# Patient Record
Sex: MALE | Race: BLACK OR AFRICAN AMERICAN | Employment: UNEMPLOYED | URBAN - METROPOLITAN AREA
[De-identification: names, ages, dates, MRNs, and addresses within clinical notes are randomized per-mention and may not be internally consistent; named-entity substitution may affect disease eponyms.]

---

## 2017-01-16 PROBLEM — I10 HTN (HYPERTENSION): Status: ACTIVE | Noted: 2017-01-16

## 2017-01-16 PROBLEM — A41.9 SEPSIS (HCC): Status: ACTIVE | Noted: 2017-01-16

## 2017-01-16 PROBLEM — Z93.59 SUPRAPUBIC CATHETER (HCC): Status: ACTIVE | Noted: 2017-01-16

## 2018-03-07 ENCOUNTER — HOSPITAL ENCOUNTER (INPATIENT)
Dept: ONCOLOGY | Age: 68
LOS: 14 days | Discharge: NURSING, REHAB OR LONG-TERM CARE | DRG: 244 | End: 2018-03-21
Attending: EMERGENCY MEDICINE | Admitting: INTERNAL MEDICINE
Payer: MEDICAID

## 2018-03-07 DIAGNOSIS — D50.0 ANEMIA, BLOOD LOSS: ICD-10-CM

## 2018-03-07 DIAGNOSIS — E87.5 HYPERKALEMIA: ICD-10-CM

## 2018-03-07 DIAGNOSIS — K92.2 GASTROINTESTINAL HEMORRHAGE, UNSPECIFIED GASTROINTESTINAL HEMORRHAGE TYPE: Primary | ICD-10-CM

## 2018-03-07 DIAGNOSIS — N28.9 RENAL INSUFFICIENCY: ICD-10-CM

## 2018-03-07 PROBLEM — D62 ACUTE BLOOD LOSS ANEMIA: Status: ACTIVE | Noted: 2018-03-07

## 2018-03-07 PROBLEM — N17.9 AKI (ACUTE KIDNEY INJURY) (HCC): Status: ACTIVE | Noted: 2018-03-07

## 2018-03-07 PROBLEM — E78.5 HYPERLIPIDEMIA: Chronic | Status: ACTIVE | Noted: 2018-03-07

## 2018-03-07 PROBLEM — E11.9 DM2 (DIABETES MELLITUS, TYPE 2) (HCC): Chronic | Status: ACTIVE | Noted: 2018-03-07

## 2018-03-07 PROBLEM — I10 HTN (HYPERTENSION): Chronic | Status: ACTIVE | Noted: 2017-01-16

## 2018-03-07 LAB
ABO/RH: NORMAL
ALBUMIN SERPL-MCNC: 3.5 G/DL (ref 3.5–5.2)
ALP BLD-CCNC: 102 U/L (ref 40–129)
ALT SERPL-CCNC: 8 U/L (ref 0–40)
ANION GAP SERPL CALCULATED.3IONS-SCNC: 11 MMOL/L (ref 7–16)
ANTIBODY SCREEN: NORMAL
AST SERPL-CCNC: 13 U/L (ref 0–39)
BASOPHILS ABSOLUTE: 0.01 E9/L (ref 0–0.2)
BASOPHILS RELATIVE PERCENT: 0.2 % (ref 0–2)
BILIRUB SERPL-MCNC: <0.2 MG/DL (ref 0–1.2)
BUN BLDV-MCNC: 26 MG/DL (ref 8–23)
CALCIUM SERPL-MCNC: 9.3 MG/DL (ref 8.6–10.2)
CHLORIDE BLD-SCNC: 103 MMOL/L (ref 98–107)
CO2: 26 MMOL/L (ref 22–29)
CREAT SERPL-MCNC: 1.8 MG/DL (ref 0.7–1.2)
EOSINOPHILS ABSOLUTE: 0.46 E9/L (ref 0.05–0.5)
EOSINOPHILS RELATIVE PERCENT: 7.9 % (ref 0–6)
GFR AFRICAN AMERICAN: 46
GFR NON-AFRICAN AMERICAN: 46 ML/MIN/1.73
GLUCOSE BLD-MCNC: 95 MG/DL (ref 74–109)
HCT VFR BLD CALC: 20.6 % (ref 37–54)
HEMOGLOBIN: 6.1 G/DL (ref 12.5–16.5)
IMMATURE GRANULOCYTES #: 0.02 E9/L
IMMATURE GRANULOCYTES %: 0.3 % (ref 0–5)
LYMPHOCYTES ABSOLUTE: 2.38 E9/L (ref 1.5–4)
LYMPHOCYTES RELATIVE PERCENT: 41.1 % (ref 20–42)
MCH RBC QN AUTO: 30 PG (ref 26–35)
MCHC RBC AUTO-ENTMCNC: 29.6 % (ref 32–34.5)
MCV RBC AUTO: 101.5 FL (ref 80–99.9)
MONOCYTES ABSOLUTE: 0.36 E9/L (ref 0.1–0.95)
MONOCYTES RELATIVE PERCENT: 6.2 % (ref 2–12)
NEUTROPHILS ABSOLUTE: 2.56 E9/L (ref 1.8–7.3)
NEUTROPHILS RELATIVE PERCENT: 44.3 % (ref 43–80)
PDW BLD-RTO: 15.2 FL (ref 11.5–15)
PLATELET # BLD: 715 E9/L (ref 130–450)
PMV BLD AUTO: 8 FL (ref 7–12)
POTASSIUM SERPL-SCNC: 5.4 MMOL/L (ref 3.5–5)
RBC # BLD: 2.03 E12/L (ref 3.8–5.8)
SODIUM BLD-SCNC: 140 MMOL/L (ref 132–146)
TOTAL PROTEIN: 6.8 G/DL (ref 6.4–8.3)
TROPONIN: 0.01 NG/ML (ref 0–0.03)
WBC # BLD: 5.8 E9/L (ref 4.5–11.5)

## 2018-03-07 PROCEDURE — 84484 ASSAY OF TROPONIN QUANT: CPT

## 2018-03-07 PROCEDURE — 86850 RBC ANTIBODY SCREEN: CPT

## 2018-03-07 PROCEDURE — 87070 CULTURE OTHR SPECIMN AEROBIC: CPT

## 2018-03-07 PROCEDURE — 85025 COMPLETE CBC W/AUTO DIFF WBC: CPT

## 2018-03-07 PROCEDURE — P9016 RBC LEUKOCYTES REDUCED: HCPCS

## 2018-03-07 PROCEDURE — 86901 BLOOD TYPING SEROLOGIC RH(D): CPT

## 2018-03-07 PROCEDURE — C9113 INJ PANTOPRAZOLE SODIUM, VIA: HCPCS

## 2018-03-07 PROCEDURE — 93005 ELECTROCARDIOGRAM TRACING: CPT

## 2018-03-07 PROCEDURE — 9990 CHARGE CONVERSION

## 2018-03-07 PROCEDURE — 86923 COMPATIBILITY TEST ELECTRIC: CPT

## 2018-03-07 PROCEDURE — 99285 EMERGENCY DEPT VISIT HI MDM: CPT

## 2018-03-07 PROCEDURE — 86900 BLOOD TYPING SEROLOGIC ABO: CPT

## 2018-03-07 PROCEDURE — 80053 COMPREHEN METABOLIC PANEL: CPT

## 2018-03-07 PROCEDURE — 36415 COLL VENOUS BLD VENIPUNCTURE: CPT

## 2018-03-07 RX ORDER — GUAIFENESIN AND DEXTROMETHORPHAN HYDROBROMIDE 100; 10 MG/5ML; MG/5ML
15 SOLUTION ORAL EVERY 4 HOURS PRN
COMMUNITY
End: 2018-07-30

## 2018-03-07 RX ORDER — 0.9 % SODIUM CHLORIDE 0.9 %
250 INTRAVENOUS SOLUTION INTRAVENOUS ONCE
Status: COMPLETED | OUTPATIENT
Start: 2018-03-07 | End: 2018-03-09

## 2018-03-07 RX ORDER — SODIUM CHLORIDE 0.9 % (FLUSH) 0.9 %
10 SYRINGE (ML) INJECTION EVERY 12 HOURS
Status: DISCONTINUED | OUTPATIENT
Start: 2018-03-07 | End: 2018-03-21 | Stop reason: HOSPADM

## 2018-03-07 RX ORDER — FLUTICASONE PROPIONATE 50 MCG
1 SPRAY, SUSPENSION (ML) NASAL 2 TIMES DAILY
COMMUNITY

## 2018-03-07 RX ORDER — SODIUM CHLORIDE 0.9 % (FLUSH) 0.9 %
10 SYRINGE (ML) INJECTION PRN
Status: DISCONTINUED | OUTPATIENT
Start: 2018-03-07 | End: 2018-03-21 | Stop reason: HOSPADM

## 2018-03-07 RX ORDER — ACETAMINOPHEN 325 MG/1
650 TABLET ORAL EVERY 4 HOURS PRN
Status: DISCONTINUED | OUTPATIENT
Start: 2018-03-07 | End: 2018-03-21 | Stop reason: HOSPADM

## 2018-03-07 RX ORDER — SODIUM CHLORIDE 0.9 % (FLUSH) 0.9 %
10 SYRINGE (ML) INJECTION EVERY 12 HOURS SCHEDULED
Status: DISCONTINUED | OUTPATIENT
Start: 2018-03-07 | End: 2018-03-21 | Stop reason: HOSPADM

## 2018-03-07 RX ORDER — 0.9 % SODIUM CHLORIDE 0.9 %
1000 INTRAVENOUS SOLUTION INTRAVENOUS ONCE
Status: COMPLETED | OUTPATIENT
Start: 2018-03-07 | End: 2018-03-07

## 2018-03-07 RX ORDER — M-VIT,TX,IRON,MINS/CALC/FOLIC 27MG-0.4MG
1 TABLET ORAL DAILY
Status: ON HOLD | COMMUNITY
End: 2020-01-01 | Stop reason: HOSPADM

## 2018-03-07 RX ORDER — PANTOPRAZOLE SODIUM 40 MG/10ML
40 INJECTION, POWDER, LYOPHILIZED, FOR SOLUTION INTRAVENOUS ONCE
Status: COMPLETED | OUTPATIENT
Start: 2018-03-07 | End: 2018-03-07

## 2018-03-07 RX ADMIN — Medication 1000 ML: at 19:00

## 2018-03-07 RX ADMIN — Medication 10 ML: at 23:30

## 2018-03-07 RX ADMIN — PANTOPRAZOLE SODIUM 40 MG: 40 INJECTION, POWDER, LYOPHILIZED, FOR SOLUTION INTRAVENOUS at 20:32

## 2018-03-07 ASSESSMENT — PAIN - FUNCTIONAL ASSESSMENT: PAIN_FUNCTIONAL_ASSESSMENT: 0-10

## 2018-03-07 ASSESSMENT — PAIN SCALES - GENERAL: PAINLEVEL_OUTOF10: 0

## 2018-03-08 PROBLEM — Z87.891 FORMER SMOKER: Chronic | Status: ACTIVE | Noted: 2018-03-07

## 2018-03-08 PROBLEM — F03.90 DEMENTIA (HCC): Chronic | Status: ACTIVE | Noted: 2018-03-07

## 2018-03-08 PROBLEM — Z93.59 SUPRAPUBIC CATHETER (HCC): Chronic | Status: ACTIVE | Noted: 2017-01-16

## 2018-03-08 PROBLEM — L98.499 NON-HEALING ULCER (HCC): Chronic | Status: ACTIVE | Noted: 2018-03-07

## 2018-03-08 PROBLEM — R32 INCONTINENCE: Chronic | Status: ACTIVE | Noted: 2018-03-07

## 2018-03-08 PROBLEM — I69.30 CHRONIC ARTERIAL ISCHEMIC STROKE: Chronic | Status: ACTIVE | Noted: 2018-03-07

## 2018-03-08 PROBLEM — L98.499 NON-HEALING ULCER (HCC): Status: ACTIVE | Noted: 2018-03-07

## 2018-03-08 LAB
ANION GAP SERPL CALCULATED.3IONS-SCNC: 10 MMOL/L (ref 7–16)
ANISOCYTOSIS: ABNORMAL
BASOPHILIC STIPPLING: ABNORMAL
BASOPHILS ABSOLUTE: 0.01 E9/L (ref 0–0.2)
BASOPHILS RELATIVE PERCENT: 0.2 % (ref 0–2)
BLOOD BANK DISPENSE STATUS: NORMAL
BLOOD BANK DISPENSE STATUS: NORMAL
BLOOD BANK PRODUCT CODE: NORMAL
BLOOD BANK PRODUCT CODE: NORMAL
BPU ID: NORMAL
BPU ID: NORMAL
BUN BLDV-MCNC: 21 MG/DL (ref 8–23)
BURR CELLS: ABNORMAL
CALCIUM SERPL-MCNC: 8.4 MG/DL (ref 8.6–10.2)
CHLORIDE BLD-SCNC: 102 MMOL/L (ref 98–107)
CO2: 24 MMOL/L (ref 22–29)
CREAT SERPL-MCNC: 1.8 MG/DL (ref 0.7–1.2)
DESCRIPTION BLOOD BANK: NORMAL
DESCRIPTION BLOOD BANK: NORMAL
EOSINOPHILS ABSOLUTE: 0.49 E9/L (ref 0.05–0.5)
EOSINOPHILS RELATIVE PERCENT: 7.8 % (ref 0–6)
FERRITIN: 53 NG/ML
FOLATE: 18 NG/ML (ref 4.8–24.2)
GFR AFRICAN AMERICAN: 46
GFR NON-AFRICAN AMERICAN: 46 ML/MIN/1.73
GLUCOSE BLD-MCNC: 189 MG/DL (ref 74–109)
HBA1C MFR BLD: 5.6 % (ref 4.8–5.9)
HCT VFR BLD CALC: 25.2 % (ref 37–54)
HCT VFR BLD CALC: 25.4 % (ref 37–54)
HCT VFR BLD CALC: 26 % (ref 37–54)
HEMOGLOBIN: 7.9 G/DL (ref 12.5–16.5)
HEMOGLOBIN: 8 G/DL (ref 12.5–16.5)
HEMOGLOBIN: 8.2 G/DL (ref 12.5–16.5)
HYPOCHROMIA: ABNORMAL
IMMATURE GRANULOCYTES #: 0.04 E9/L
IMMATURE GRANULOCYTES %: 0.6 % (ref 0–5)
IMMATURE RETIC FRACT: 25.6 % (ref 2.3–13.4)
IRON SATURATION: 20 % (ref 20–55)
IRON: 66 MCG/DL (ref 59–158)
LYMPHOCYTES ABSOLUTE: 1.46 E9/L (ref 1.5–4)
LYMPHOCYTES RELATIVE PERCENT: 23.2 % (ref 20–42)
MCH RBC QN AUTO: 30 PG (ref 26–35)
MCHC RBC AUTO-ENTMCNC: 31.5 % (ref 32–34.5)
MCV RBC AUTO: 95.2 FL (ref 80–99.9)
METER GLUCOSE: 118 MG/DL (ref 70–110)
METER GLUCOSE: 170 MG/DL (ref 70–110)
METER GLUCOSE: 184 MG/DL (ref 70–110)
METER GLUCOSE: 233 MG/DL (ref 70–110)
MONOCYTES ABSOLUTE: 0.4 E9/L (ref 0.1–0.95)
MONOCYTES RELATIVE PERCENT: 6.4 % (ref 2–12)
NEUTROPHILS ABSOLUTE: 3.88 E9/L (ref 1.8–7.3)
NEUTROPHILS RELATIVE PERCENT: 61.8 % (ref 43–80)
OVALOCYTES: ABNORMAL
PDW BLD-RTO: 17.4 FL (ref 11.5–15)
PLATELET # BLD: 659 E9/L (ref 130–450)
PMV BLD AUTO: 7.8 FL (ref 7–12)
POIKILOCYTES: ABNORMAL
POLYCHROMASIA: ABNORMAL
POTASSIUM REFLEX MAGNESIUM: 5.7 MMOL/L (ref 3.5–5)
RBC # BLD: 2.73 E12/L (ref 3.8–5.8)
RETIC HGB EQUIVALENT: 26.2 PG (ref 28.2–36.6)
RETICULOCYTE ABSOLUTE COUNT: 0.08 E12/L
RETICULOCYTE COUNT PCT: 3 % (ref 0.4–1.9)
SCHISTOCYTES: ABNORMAL
SODIUM BLD-SCNC: 136 MMOL/L (ref 132–146)
TARGET CELLS: ABNORMAL
TOTAL IRON BINDING CAPACITY: 323 MCG/DL (ref 250–450)
VITAMIN B-12: 997 PG/ML (ref 211–946)
WBC # BLD: 6.3 E9/L (ref 4.5–11.5)

## 2018-03-08 PROCEDURE — 97165 OT EVAL LOW COMPLEX 30 MIN: CPT

## 2018-03-08 PROCEDURE — 87147 CULTURE TYPE IMMUNOLOGIC: CPT

## 2018-03-08 PROCEDURE — 85014 HEMATOCRIT: CPT

## 2018-03-08 PROCEDURE — 82746 ASSAY OF FOLIC ACID SERUM: CPT

## 2018-03-08 PROCEDURE — 97162 PT EVAL MOD COMPLEX 30 MIN: CPT

## 2018-03-08 PROCEDURE — P9016 RBC LEUKOCYTES REDUCED: HCPCS

## 2018-03-08 PROCEDURE — 97530 THERAPEUTIC ACTIVITIES: CPT

## 2018-03-08 PROCEDURE — 82607 VITAMIN B-12: CPT

## 2018-03-08 PROCEDURE — 82728 ASSAY OF FERRITIN: CPT

## 2018-03-08 PROCEDURE — G8987 SELF CARE CURRENT STATUS: HCPCS

## 2018-03-08 PROCEDURE — C9113 INJ PANTOPRAZOLE SODIUM, VIA: HCPCS

## 2018-03-08 PROCEDURE — G8981 BODY POS CURRENT STATUS: HCPCS

## 2018-03-08 PROCEDURE — G8982 BODY POS GOAL STATUS: HCPCS

## 2018-03-08 PROCEDURE — 80048 BASIC METABOLIC PNL TOTAL CA: CPT

## 2018-03-08 PROCEDURE — 85025 COMPLETE CBC W/AUTO DIFF WBC: CPT

## 2018-03-08 PROCEDURE — 36415 COLL VENOUS BLD VENIPUNCTURE: CPT

## 2018-03-08 PROCEDURE — 82962 GLUCOSE BLOOD TEST: CPT

## 2018-03-08 PROCEDURE — 83550 IRON BINDING TEST: CPT

## 2018-03-08 PROCEDURE — 83540 ASSAY OF IRON: CPT

## 2018-03-08 PROCEDURE — G8988 SELF CARE GOAL STATUS: HCPCS

## 2018-03-08 PROCEDURE — 85045 AUTOMATED RETICULOCYTE COUNT: CPT

## 2018-03-08 PROCEDURE — 87070 CULTURE OTHR SPECIMN AEROBIC: CPT

## 2018-03-08 PROCEDURE — 87081 CULTURE SCREEN ONLY: CPT

## 2018-03-08 PROCEDURE — 36430 TRANSFUSION BLD/BLD COMPNT: CPT

## 2018-03-08 PROCEDURE — 83036 HEMOGLOBIN GLYCOSYLATED A1C: CPT

## 2018-03-08 PROCEDURE — 73630 X-RAY EXAM OF FOOT: CPT

## 2018-03-08 PROCEDURE — 9990 CHARGE CONVERSION

## 2018-03-08 PROCEDURE — 85018 HEMOGLOBIN: CPT

## 2018-03-08 RX ORDER — NICOTINE POLACRILEX 4 MG
15 LOZENGE BUCCAL PRN
Status: DISCONTINUED | OUTPATIENT
Start: 2018-03-08 | End: 2018-03-21 | Stop reason: HOSPADM

## 2018-03-08 RX ORDER — DEXTROSE MONOHYDRATE 25 G/50ML
12.5 INJECTION, SOLUTION INTRAVENOUS PRN
Status: DISCONTINUED | OUTPATIENT
Start: 2018-03-08 | End: 2018-03-21 | Stop reason: HOSPADM

## 2018-03-08 RX ORDER — INSULIN GLARGINE 100 [IU]/ML
10 INJECTION, SOLUTION SUBCUTANEOUS NIGHTLY
Status: DISCONTINUED | OUTPATIENT
Start: 2018-03-08 | End: 2018-03-21 | Stop reason: HOSPADM

## 2018-03-08 RX ORDER — SODIUM CHLORIDE 0.9 % (FLUSH) 0.9 %
10 SYRINGE (ML) INJECTION PRN
Status: DISCONTINUED | OUTPATIENT
Start: 2018-03-08 | End: 2018-03-08 | Stop reason: SDUPTHER

## 2018-03-08 RX ORDER — SODIUM CHLORIDE 0.9 % (FLUSH) 0.9 %
10 SYRINGE (ML) INJECTION EVERY 12 HOURS SCHEDULED
Status: DISCONTINUED | OUTPATIENT
Start: 2018-03-08 | End: 2018-03-08 | Stop reason: SDUPTHER

## 2018-03-08 RX ORDER — MIRTAZAPINE 15 MG/1
7.5 TABLET, FILM COATED ORAL NIGHTLY
Status: DISCONTINUED | OUTPATIENT
Start: 2018-03-08 | End: 2018-03-21 | Stop reason: HOSPADM

## 2018-03-08 RX ORDER — LOSARTAN POTASSIUM 50 MG/1
50 TABLET ORAL DAILY
Status: DISCONTINUED | OUTPATIENT
Start: 2018-03-08 | End: 2018-03-08

## 2018-03-08 RX ORDER — ATORVASTATIN CALCIUM 40 MG/1
80 TABLET, FILM COATED ORAL NIGHTLY
Status: DISCONTINUED | OUTPATIENT
Start: 2018-03-08 | End: 2018-03-21 | Stop reason: HOSPADM

## 2018-03-08 RX ORDER — FLUTICASONE PROPIONATE 50 MCG
1 SPRAY, SUSPENSION (ML) NASAL 2 TIMES DAILY
Status: DISCONTINUED | OUTPATIENT
Start: 2018-03-08 | End: 2018-03-21 | Stop reason: HOSPADM

## 2018-03-08 RX ORDER — SODIUM CHLORIDE 9 MG/ML
INJECTION, SOLUTION INTRAVENOUS CONTINUOUS
Status: DISCONTINUED | OUTPATIENT
Start: 2018-03-08 | End: 2018-03-10

## 2018-03-08 RX ORDER — ATENOLOL 50 MG/1
50 TABLET ORAL DAILY
Status: DISCONTINUED | OUTPATIENT
Start: 2018-03-08 | End: 2018-03-12

## 2018-03-08 RX ORDER — AMMONIUM LACTATE 12 G/100G
LOTION TOPICAL PRN
Status: DISCONTINUED | OUTPATIENT
Start: 2018-03-08 | End: 2018-03-21 | Stop reason: HOSPADM

## 2018-03-08 RX ORDER — ONDANSETRON 2 MG/ML
4 INJECTION INTRAMUSCULAR; INTRAVENOUS EVERY 6 HOURS PRN
Status: DISCONTINUED | OUTPATIENT
Start: 2018-03-08 | End: 2018-03-21 | Stop reason: HOSPADM

## 2018-03-08 RX ORDER — CLONIDINE HYDROCHLORIDE 0.1 MG/1
0.1 TABLET ORAL DAILY
Status: DISCONTINUED | OUTPATIENT
Start: 2018-03-12 | End: 2018-03-19

## 2018-03-08 RX ORDER — PANTOPRAZOLE SODIUM 40 MG/10ML
40 INJECTION, POWDER, LYOPHILIZED, FOR SOLUTION INTRAVENOUS 2 TIMES DAILY
Status: DISCONTINUED | OUTPATIENT
Start: 2018-03-08 | End: 2018-03-17

## 2018-03-08 RX ORDER — M-VIT,TX,IRON,MINS/CALC/FOLIC 27MG-0.4MG
1 TABLET ORAL DAILY
Status: DISCONTINUED | OUTPATIENT
Start: 2018-03-08 | End: 2018-03-21 | Stop reason: HOSPADM

## 2018-03-08 RX ORDER — DEXTROSE MONOHYDRATE 50 MG/ML
100 INJECTION, SOLUTION INTRAVENOUS PRN
Status: DISCONTINUED | OUTPATIENT
Start: 2018-03-08 | End: 2018-03-21 | Stop reason: HOSPADM

## 2018-03-08 RX ORDER — GUAIFENESIN/DEXTROMETHORPHAN 100-10MG/5
10 SYRUP ORAL EVERY 4 HOURS PRN
Status: DISCONTINUED | OUTPATIENT
Start: 2018-03-08 | End: 2018-03-21 | Stop reason: HOSPADM

## 2018-03-08 RX ORDER — DONEPEZIL HYDROCHLORIDE 5 MG/1
5 TABLET, FILM COATED ORAL NIGHTLY
Status: DISCONTINUED | OUTPATIENT
Start: 2018-03-08 | End: 2018-03-21 | Stop reason: HOSPADM

## 2018-03-08 RX ADMIN — INSULIN LISPRO 4 UNITS: 100 INJECTION, SOLUTION INTRAVENOUS; SUBCUTANEOUS at 08:16

## 2018-03-08 RX ADMIN — MIRTAZAPINE 7.5 MG: 15 TABLET, FILM COATED ORAL at 22:37

## 2018-03-08 RX ADMIN — LOSARTAN POTASSIUM 50 MG: 50 TABLET ORAL at 08:33

## 2018-03-08 RX ADMIN — SODIUM CHLORIDE: 9 INJECTION, SOLUTION INTRAVENOUS at 05:40

## 2018-03-08 RX ADMIN — INSULIN GLARGINE 10 UNITS: 100 INJECTION, SOLUTION SUBCUTANEOUS at 22:31

## 2018-03-08 RX ADMIN — INSULIN LISPRO 2 UNITS: 100 INJECTION, SOLUTION INTRAVENOUS; SUBCUTANEOUS at 22:32

## 2018-03-08 RX ADMIN — SODIUM CHLORIDE: 9 INJECTION, SOLUTION INTRAVENOUS at 21:14

## 2018-03-08 RX ADMIN — Medication 1 SPRAY: at 08:33

## 2018-03-08 RX ADMIN — Medication 250 ML: at 23:00

## 2018-03-08 RX ADMIN — Medication: at 22:36

## 2018-03-08 RX ADMIN — MULTIPLE VITAMINS W/ MINERALS TAB 1 TABLET: TAB at 08:33

## 2018-03-08 RX ADMIN — Medication 10 ML: at 05:43

## 2018-03-08 RX ADMIN — ATENOLOL 50 MG: 50 TABLET ORAL at 08:33

## 2018-03-08 RX ADMIN — Medication 1 SPRAY: at 22:38

## 2018-03-08 RX ADMIN — ATORVASTATIN CALCIUM 80 MG: 40 TABLET, FILM COATED ORAL at 22:37

## 2018-03-08 RX ADMIN — PANTOPRAZOLE SODIUM 40 MG: 40 INJECTION, POWDER, FOR SOLUTION INTRAVENOUS at 22:36

## 2018-03-08 RX ADMIN — DONEPEZIL HYDROCHLORIDE 5 MG: 5 TABLET, FILM COATED ORAL at 22:37

## 2018-03-08 RX ADMIN — Medication 10 ML: at 22:37

## 2018-03-08 RX ADMIN — INSULIN LISPRO 2 UNITS: 100 INJECTION, SOLUTION INTRAVENOUS; SUBCUTANEOUS at 16:53

## 2018-03-08 RX ADMIN — PANTOPRAZOLE SODIUM 40 MG: 40 INJECTION, POWDER, FOR SOLUTION INTRAVENOUS at 08:24

## 2018-03-08 RX ADMIN — Medication 400 MG: at 08:33

## 2018-03-08 ASSESSMENT — PAIN SCALES - GENERAL
PAINLEVEL_OUTOF10: 0

## 2018-03-08 ASSESSMENT — PAIN DESCRIPTION - PAIN TYPE: TYPE: ACUTE PAIN

## 2018-03-08 NOTE — H&P
100 MG capsule, Take 1 capsule by mouth 2 times daily  vitamin D (ERGOCALCIFEROL) 38430 UNITS capsule, Take 1 capsule by mouth once a week (Patient taking differently: Take 50,000 Units by mouth once a week )  donepezil (ARICEPT) 5 MG tablet, Take 1 tablet by mouth nightly  insulin lispro (HUMALOG) 100 UNIT/ML injection vial, Inject 0-6 Units into the skin 3 times daily (with meals) Use low dose glucose algorithm  atenolol (TENORMIN) 25 MG tablet, Take 1 tablet by mouth daily (Patient taking differently: Take 50 mg by mouth daily )  metFORMIN (GLUCOPHAGE) 1000 MG tablet, Take 1 tablet by mouth 2 times daily (with meals)  magnesium oxide (MAG-OX) 400 (240 MG) MG tablet, Take 1 tablet by mouth daily  [DISCONTINUED] albuterol (PROVENTIL) (2.5 MG/3ML) 0.083% nebulizer solution, Take 3 mLs by nebulization 4 times daily  losartan (COZAAR) 25 MG tablet, Take 1 tablet by mouth daily (Patient taking differently: Take 50 mg by mouth daily )  acetaminophen 650 MG TABS, Take 650 mg by mouth 3 times daily as needed  atorvastatin (LIPITOR) 80 MG tablet, Take 1 tablet by mouth nightly  ammonium lactate (LAC-HYDRIN) 12 % lotion, Apply to affected areas as needed  cilostazol (PLETAL) 100 MG tablet, Take 1 tablet by mouth 2 times daily  clopidogrel (PLAVIX) 75 MG tablet, Take 1 tablet by mouth daily    Allergies:    Patient has no known allergies. Social History:    reports that he has been smoking Cigarettes. He has a 5.00 pack-year smoking history. He has never used smokeless tobacco. He reports that he does not drink alcohol or use drugs. Cig: was 1-2 PPD x 50 yrs. Quit 2017. EtOH: was heavy, often vodka 750 mL/d. Quit 2017. Was working as a  for [employer]. . Now being cared for in an [facility]). Family History:   family history includes Heart Disease in his mother. Mother had DM and  of MI. Father  of possibly a GI problem.   Sibs, 6 children - all ok per care.  · Dry dressing to left hallux non-healing ulcer. If significant discharge develops, consider Podiatry and ID consults. · DVT prophylaxis - SCDs.       Please note that over 50 minutes was spent in evaluating the patient, review of records and results, discussion with staff, etc.    Electronically signed by Yuridia Larson, [address] [facility] / [address] [facility] at [facility]

## 2018-03-08 NOTE — PROGRESS NOTES
Notified Dr. Gilberto Leggett of am lab results.   Electronically signed by Kayden West RN on 3/8/2018 at 11:14 AM

## 2018-03-08 NOTE — PROGRESS NOTES
Patient is from [place]. Spoke to [place] who will fax me over all of patient's information.     Electronically signed by Mariluz Oropeza RN on 3/7/2018 at 9:30 PM

## 2018-03-08 NOTE — PROGRESS NOTES
staff.     Short-Term Goals:  Time Frame: 2 weeks    1. Patient will perform grooming tasks with setup while seated. 2. Patient will perform upper body dressing/bathing tasks with setup. 3. Patient will demonstrate 4+/5 BUE strength in order to maximize independence with ADLs. 4. Patient will perform lower body dressing/bathing with Min A, with use of AE/DME as needed/appropriate. 5. Patient will perform all aspects of toileting with Mod A.    6. Patient will perform functional transfers with CGA in order to maximize independence with ADLs. 7. Patient will perform functional mobility, using AD as needed, with CGA in order to maximize independence with ADLs. 8. Patient will perform self-feeding tasks with setup. 9. Patient will demonstrate Good understanding and consistent implementation of energy conservation techniques and work simplification techniques into ADL routines. 10. Patient will participate in completion of 20 repetitions of B UE AROM/AAROM exercises in all available planes of motion, without indication of pain/discomfort, in order to improve UE strength for the purpose of maximizing patient's independence with ADLs. Rehab Potential: Good    Patient and/or family indicated understanding of this OT plan of care: Yes    OT Evaluation Complexity Level: Low  This level of OT evaluation was determined based upon the of the following: complexity of occupational profile and review of medical/therapy history completed, number of performance deficits demonstrated, and level of clinical decision making required to develop this OT plan of care. Low Complexity Medium Complexity High Complexity Comments:   Occupational Profile and Medical/Therapy History X   Brief review of patient's medical/therapy history completed. Number of Performance Deficits  X  Patient demonstrates five performance deficits.    Clinical Decision Making X   Clinical decision making of low analytic complexity needed for development of this OT plan of care. Patient demonstrates few comorbidities that affect occupational performance. Minimal modifications needed to facilitate activities during this evaluation. Gunjan Temple, OTR/L  License Number: [id number]

## 2018-03-08 NOTE — CONSULTS
PRN  0.9 % sodium chloride infusion, , Intravenous, Continuous  pantoprazole (PROTONIX) injection 40 mg, 40 mg, Intravenous, BID  0.9 % sodium chloride bolus, 250 mL, Intravenous, Once  sodium chloride flush 0.9 % injection 10 mL, 10 mL, Intravenous, Q12H  sodium chloride flush 0.9 % injection 10 mL, 10 mL, Intravenous, 2 times per day  sodium chloride flush 0.9 % injection 10 mL, 10 mL, Intravenous, PRN  acetaminophen (TYLENOL) tablet 650 mg, 650 mg, Oral, Q4H PRN    Allergies:  Patient has no known allergies. Social History:    Tobacco:  Report he quit when he was admitted to [place] (unsure of the date). Reports smoking 1 PPD since age 26  Alcohol:  Pt reports quit approx 5 yrs ago; prior, drank 1/5 vodka or gin daily for about 18 yrs. Illicit Drugs: Denies use    Family History: Mother-- from unknown cause  Father-- from unknown cause  1 Sister--alive; unsure of health status  1 Brother--alive; unsure of health status  3 Daughters--alive and healthy  3 Sons--alive and healthy    REVIEW OF SYSTEMS:    Aside from what was mentioned in the PMH and HPI, essentially unremarkable, all others negative. PHYSICAL EXAM:      Vitals:    BP (!) 181/83   Pulse 60   Temp 98.4 °F (36.9 °C) (Oral)   Resp 16   Ht 6' 1\" (1.854 m)   Wt 154 lb (69.9 kg)   SpO2 100%   BMI 20.32 kg/m²       CONSTITUTIONAL:  awake, alert, cooperative, no apparent distress, and appears stated age  EYES:  pupils equal, round and reactive to light, sclera anicteric and conjunctiva normal  ENT:  normocephalic, oral pharynx with moist mucous membranes  NECK:  supple   LUNGS:  No increased work of breathing, good air exchange, clear to auscultation bilaterally.   CARDIOVASCULAR:  Normal apical impulse, regular rate and rhythm, no murmur noted; 2+ pulses; without edema  ABDOMEN:  , normal bowel sounds, soft, non-distended, non-tender, no masses palpated, no hepatosplenomegally; watt patent and draining clear yellow 08/30/2016     TSH:    Lab Results   Component Value Date    TSH 1.490 12/22/2016     VITAMIN B12:   Lab Results   Component Value Date    YXNPIQEJ76 910 09/07/2016     FOLATE:    Lab Results   Component Value Date    FOLATE 15.5 09/07/2016     IRON:    Lab Results   Component Value Date    IRON 47 09/11/2016     Iron Saturation:    Lab Results   Component Value Date    LABIRON 20 09/11/2016     TIBC:    Lab Results   Component Value Date    TIBC 239 09/11/2016     FERRITIN:    Lab Results   Component Value Date    FERRITIN 310 09/11/2016       Lab Results   Component Value Date    TRIG 113 05/28/2016    TRIG 62 09/21/2015    TRIG 183 (H) 01/10/2015     Lab Results   Component Value Date    HDL 49 05/28/2016    HDL 43 09/21/2015    HDL 40 01/10/2015     Lab Results   Component Value Date    LDLCALC 32 05/28/2016    LDLCALC 148 (H) 09/21/2015    LDLCALC 150 (H) 01/10/2015     Lab Results   Component Value Date    LABVLDL 23 05/28/2016    LABVLDL 12 09/21/2015    LABVLDL 37 01/10/2015        No results found. IMPRESSION:    Principal Problem:     Probable GI bleed--Hgb 6.1 in ER  Active Problems:  · Anemia, normocytic, normochromic felt secondary to blood loss--S/P transfusion with 2 U PRBC's  · TERESITA  · Hx of alcohol abuse in past  · Diabetes Mellitus, type 2  · PVD currently on Plavix and Pletal  · Hyperkalemia    RECOMMENDATIONS:      · EGD tomorrow if electrolytes stabilize; Procedure details for EGD discussed in detail. Complications including but not limited to, perforation, bleeding and infection were discussed in great detail. Risks, benefits, and alternatives explained. Pt has understood the information and has agreed to proceed. · Continue Protonix as ordered  · H/H q6h  · Transfuse for Hgb < 7.0  · Continue to monitor CBC, CMP daily  · Management of Hyperkalemia per PCP  · Will continue to follow    Thank you very much for your consultation. We will follow closely with you.     Discussed with

## 2018-03-08 NOTE — ED NOTES
Patient type and screen finally able to be collected after multiple attempts at re draw. Lab bipin via art stick. Patient tolerated without difficulty. VS stable hemodynamically denies any pain or discomfort at this time. Watching tv with no complaints of any discomfort or pain or nausea. States he is hungry and upset he cannot eat anything at this time.       Lynn Jara, RN  03/07/18 2018

## 2018-03-08 NOTE — FLOWSHEET NOTE
Initial Inpatient Wound Care    Admit Date: 3/7/2018  7:34 PM    Reason for consult:     Significant history:  DM2, HTN, and s/p stroke with residual mild expressive aphasia, memory problems, incontinence of stool and urine and b/l LE weakness, adm with anemia 6.2. TERESITA  Resides SNF , dementia      Wound history:  POA states he followed with podiatry on [facility]   Findings: patient, verbal sitting in chair    03/08/18 1528   Wound 03/07/18 Left left great toe distal   Date First Assessed/Time First Assessed: 03/07/18 2130   Wound Location Orientation: Left  Wound Description (Comments): left great toe distal  Pre-existing: Yes   Wound Type Wound   Wound Cleansed Rinsed/Irrigated with saline   Dressing Change Due 03/08/18   Wound Length (cm) 2 cm   Wound Width (cm) 2 cm   Wound Depth (cm)  obs   Calculated Wound Size (cm^2) (l*w) 4 cm^2   Change in Wound Size % (l*w) -38.89   Wound Assessment White;Yellow   Drainage Amount None   Odor None   Wound 03/08/18 right great toe medial   Date First Assessed/Time First Assessed: 03/08/18 1529   Wound Description (Comments): right great toe medial  Pre-existing: Yes   Wound Cleansed Rinsed/Irrigated with saline   Dressing Change Due 03/08/18   Wound Length (cm) 2 cm   Wound Width (cm) 1 cm   Wound Depth (cm)  0.2   Calculated Wound Size (cm^2) (l*w) 2 cm^2   Odor Mild   Louise-wound Assessment Maceration   Wound 03/08/18 right 2nd medial toe   Date First Assessed/Time First Assessed: 03/08/18 1531   Wound Description (Comments): right 2nd medial toe  Pre-existing: Yes   Wound Length (cm) 3 cm   Wound Width (cm) 0.4 cm   Wound Depth (cm)  obs  (dark red, dry)   Calculated Wound Size (cm^2) (l*w) 1.2 cm^2   Louise-wound Assessment (thickened)   legs very dry, tough texture    Plan: consider vascular disease  Betadine to wounds both feet/toes  Consider podiatry- Dr. Carol Amador.  JAYE Soto, Agustin 3/8/2018 3:34 PM

## 2018-03-09 LAB
ALBUMIN SERPL-MCNC: 3 G/DL (ref 3.5–5.2)
ALP BLD-CCNC: 98 U/L (ref 40–129)
ALT SERPL-CCNC: 7 U/L (ref 0–40)
ANION GAP SERPL CALCULATED.3IONS-SCNC: 16 MMOL/L (ref 7–16)
AST SERPL-CCNC: 14 U/L (ref 0–39)
BASOPHILS ABSOLUTE: 0 E9/L (ref 0–0.2)
BASOPHILS RELATIVE PERCENT: 0 % (ref 0–2)
BILIRUB SERPL-MCNC: 0.5 MG/DL (ref 0–1.2)
BUN BLDV-MCNC: 19 MG/DL (ref 8–23)
CALCIUM SERPL-MCNC: 8.7 MG/DL (ref 8.6–10.2)
CHLORIDE BLD-SCNC: 103 MMOL/L (ref 98–107)
CO2: 20 MMOL/L (ref 22–29)
CREAT SERPL-MCNC: 1.6 MG/DL (ref 0.7–1.2)
EOSINOPHILS ABSOLUTE: 0.49 E9/L (ref 0.05–0.5)
EOSINOPHILS RELATIVE PERCENT: 10.3 % (ref 0–6)
GFR AFRICAN AMERICAN: 52
GFR NON-AFRICAN AMERICAN: 52 ML/MIN/1.73
GLUCOSE BLD-MCNC: 78 MG/DL (ref 74–109)
HCT VFR BLD CALC: 26.1 % (ref 37–54)
HEMOGLOBIN: 8.1 G/DL (ref 12.5–16.5)
IMMATURE GRANULOCYTES #: 0.01 E9/L
IMMATURE GRANULOCYTES %: 0.2 % (ref 0–5)
LYMPHOCYTES ABSOLUTE: 1.77 E9/L (ref 1.5–4)
LYMPHOCYTES RELATIVE PERCENT: 37.3 % (ref 20–42)
MCH RBC QN AUTO: 29.3 PG (ref 26–35)
MCHC RBC AUTO-ENTMCNC: 31 % (ref 32–34.5)
MCV RBC AUTO: 94.6 FL (ref 80–99.9)
METER GLUCOSE: 280 MG/DL (ref 70–110)
METER GLUCOSE: 65 MG/DL (ref 70–110)
METER GLUCOSE: 66 MG/DL (ref 70–110)
METER GLUCOSE: 75 MG/DL (ref 70–110)
METER GLUCOSE: 84 MG/DL (ref 70–110)
METER GLUCOSE: 89 MG/DL (ref 70–110)
METER GLUCOSE: 90 MG/DL (ref 70–110)
MONOCYTES ABSOLUTE: 0.41 E9/L (ref 0.1–0.95)
MONOCYTES RELATIVE PERCENT: 8.6 % (ref 2–12)
MRSA CULTURE ONLY: NORMAL
NEUTROPHILS ABSOLUTE: 2.06 E9/L (ref 1.8–7.3)
NEUTROPHILS RELATIVE PERCENT: 43.6 % (ref 43–80)
PDW BLD-RTO: 17.1 FL (ref 11.5–15)
PLATELET # BLD: 633 E9/L (ref 130–450)
PMV BLD AUTO: 7.8 FL (ref 7–12)
POTASSIUM SERPL-SCNC: 4.5 MMOL/L (ref 3.5–5)
RBC # BLD: 2.76 E12/L (ref 3.8–5.8)
SODIUM BLD-SCNC: 139 MMOL/L (ref 132–146)
TOTAL PROTEIN: 6.2 G/DL (ref 6.4–8.3)
WBC # BLD: 4.7 E9/L (ref 4.5–11.5)

## 2018-03-09 PROCEDURE — 76937 US GUIDE VASCULAR ACCESS: CPT

## 2018-03-09 PROCEDURE — C9113 INJ PANTOPRAZOLE SODIUM, VIA: HCPCS

## 2018-03-09 PROCEDURE — 9990 CHARGE CONVERSION

## 2018-03-09 PROCEDURE — 36415 COLL VENOUS BLD VENIPUNCTURE: CPT

## 2018-03-09 PROCEDURE — 82962 GLUCOSE BLOOD TEST: CPT

## 2018-03-09 PROCEDURE — 36592 COLLECT BLOOD FROM PICC: CPT

## 2018-03-09 PROCEDURE — 85025 COMPLETE CBC W/AUTO DIFF WBC: CPT

## 2018-03-09 PROCEDURE — 0DB68ZX EXCISION OF STOMACH, VIA NATURAL OR ARTIFICIAL OPENING ENDOSCOPIC, DIAGNOSTIC: ICD-10-PCS | Performed by: INTERNAL MEDICINE

## 2018-03-09 PROCEDURE — 93922 UPR/L XTREMITY ART 2 LEVELS: CPT

## 2018-03-09 PROCEDURE — 80053 COMPREHEN METABOLIC PANEL: CPT

## 2018-03-09 PROCEDURE — 87081 CULTURE SCREEN ONLY: CPT

## 2018-03-09 RX ORDER — LOSARTAN POTASSIUM 50 MG/1
50 TABLET ORAL DAILY
Status: DISCONTINUED | OUTPATIENT
Start: 2018-03-09 | End: 2018-03-12

## 2018-03-09 RX ADMIN — Medication 10 ML: at 08:53

## 2018-03-09 RX ADMIN — Medication 1 SPRAY: at 08:53

## 2018-03-09 RX ADMIN — DONEPEZIL HYDROCHLORIDE 5 MG: 5 TABLET, FILM COATED ORAL at 20:29

## 2018-03-09 RX ADMIN — Medication 1 SPRAY: at 20:32

## 2018-03-09 RX ADMIN — Medication 10 ML: at 11:54

## 2018-03-09 RX ADMIN — LOSARTAN POTASSIUM 50 MG: 50 TABLET, FILM COATED ORAL at 18:06

## 2018-03-09 RX ADMIN — Medication 10 ML: at 20:32

## 2018-03-09 RX ADMIN — PANTOPRAZOLE SODIUM 40 MG: 40 INJECTION, POWDER, FOR SOLUTION INTRAVENOUS at 08:53

## 2018-03-09 RX ADMIN — MIRTAZAPINE 7.5 MG: 15 TABLET, FILM COATED ORAL at 20:29

## 2018-03-09 RX ADMIN — ATORVASTATIN CALCIUM 80 MG: 40 TABLET, FILM COATED ORAL at 20:29

## 2018-03-09 RX ADMIN — Medication: at 09:06

## 2018-03-09 RX ADMIN — ATENOLOL 50 MG: 50 TABLET ORAL at 08:53

## 2018-03-09 RX ADMIN — DEXTROSE MONOHYDRATE 12.5 G: 25 INJECTION, SOLUTION INTRAVENOUS at 12:18

## 2018-03-09 RX ADMIN — INSULIN GLARGINE 10 UNITS: 100 INJECTION, SOLUTION SUBCUTANEOUS at 20:18

## 2018-03-09 RX ADMIN — INSULIN LISPRO 6 UNITS: 100 INJECTION, SOLUTION INTRAVENOUS; SUBCUTANEOUS at 20:18

## 2018-03-09 RX ADMIN — PANTOPRAZOLE SODIUM 40 MG: 40 INJECTION, POWDER, FOR SOLUTION INTRAVENOUS at 20:30

## 2018-03-09 RX ADMIN — SODIUM CHLORIDE: 9 INJECTION, SOLUTION INTRAVENOUS at 12:24

## 2018-03-09 ASSESSMENT — PAIN SCALES - GENERAL
PAINLEVEL_OUTOF10: 0

## 2018-03-09 NOTE — PROGRESS NOTES
ONS (when diet advanced start ONS)  Continued Inpatient Monitoring, Education not appropriate at this time, Coordination of Care    Nutrition Evaluation:   · Evaluation: Goals set   · Goals: Consume >50% meals/ONS. promote wound healing    · Monitoring: Diet Progression, NPO Status, Meal Intake, Supplement Intake, Diet Tolerance, Skin Integrity, Wound Healing, Fluid Balance, Ascites/Edema, Weight, Comparative Standards, Pertinent Labs    See Adult Nutrition Doc Flowsheet for more detail.      Electronically signed by Ashleigh Casarez RD, LD on 3/9/18 at 4:59 PM    Contact Number: [phone]

## 2018-03-09 NOTE — PROGRESS NOTES
Message left on perfect serve for Dr. Pablo Nieto that left foot xray resulted. Results read back to him. No new orders.  Roberth Waldron

## 2018-03-09 NOTE — CONSULTS
management. 2.  Educate. 3.  Apply Bactroban, 4x4, and paper tape daily, left first digit. For  right first digit, apply Santyl, 4x4, and paper tape daily. Noninvasive  arterial studies are needed to help evaluate, manage, and treat  accordingly. X rays are needed of the left foot to help evaluate, manage,  and treat accordingly. Heel cushions are needed. Prevalon boots to help  offload. Wound culture is needed to help evaluate and manage accordingly,  left first digit. Cannot rule out possible surgical intervention. Resection of bone with biopsy, left first digit, although we will await all  testing before surgery is planned and we will follow.     I would like to thank Dr. Tj Bravo for referral.        Grecia Crump DPM    D: 03/08/2018 19:08:01       T: 03/08/2018 23:11:36     [email or web address]  Job#: [id number]     Doc#: [id number]    CC:  ANDRÉS Flor DO

## 2018-03-09 NOTE — PLAN OF CARE
Problem: Nutrition  Goal: Optimal nutrition therapy  Outcome: Ongoing  Nutrition Problem: Increased nutrient needs  Intervention: Food and/or Nutrient Delivery: Start oral diet, Start ONS (when diet advanced start ONS)  Nutritional Goals: Consume >50% meals/ONS.  promote wound healing

## 2018-03-09 NOTE — PROGRESS NOTES
clear, yellow  Extremities: no cyanosis, clubbing or edema  Musculoskeletal: normal range of motion, no joint swelling, deformity or tenderness  Neurologic: speech normal         Recent Labs      03/07/18   1653  03/08/18   0722  03/09/18   0130   NA  140  136  139   K  5.4*  5.7*  4.5   CL  103  102  103   CO2  26  24  20*   BUN  26*  21  19   CREATININE  1.8*  1.8*  1.6*   GLUCOSE  95  189*  78   CALCIUM  9.3  8.4*  8.7       Recent Labs      03/07/18   1652  03/08/18   0722  03/08/18   1340  03/08/18   1935  03/09/18   0130   WBC  5.8  6.3   --    --   4.7   RBC  2.03*  2.73*   --    --   2.76*   HGB  6.1*  8.2*  7.9*  8.0*  8.1*   HCT  20.6*  26.0*  25.2*  25.4*  26.1*   MCV  101.5*  95.2   --    --   94.6   MCH  30.0  30.0   --    --   29.3   MCHC  29.6*  31.5*   --    --   31.0*   RDW  15.2*  17.4*   --    --   17.1*   PLT  715*  659*   --    --   633*   MPV  8.0  7.8   --    --   7.8       Assessment:    Principal Problem:    Acute upper GI bleed  Active Problems:    Acute blood loss anemia    TERESITA (acute kidney injury) ([facility])    Hyperkalemia, mild    PVD (peripheral vascular disease) with claudication ([facility])    DM2 (diabetes mellitus, type 2) ([facility])    Hyperlipidemia    Chronic arterial ischemic stroke, with residual expressive aphasia etc    Possible dementia    Incontinence of urine (now s/p SPC) and stool    Former smoker    Non-healing ulcer ([facility] - of left hallux    HTN (hypertension)    Suprapubic catheter ([facility]  Resolved Problems:    * No resolved hospital problems. *      Plan:  1. Suspect acute upper GI bleed:+ occult stool. Appreciate GI input. NPO for EGD today. Monitor H  & H. IVF, PPI. Monitor labs and vitals. 2. Blood loss anemia related to above:hg on admission 6.1. s/p 2 units PRBCs. Monitor H & H closely. 3. TERESITA: creatinine 1.8 on admission. Continue hydration. Monitor- most likely related to hypovolemia. 1.6 today. 4. Hyperkalemia: 5.4 then 5.7. Will restart Losartan. Monitor Labs closely.

## 2018-03-09 NOTE — PROGRESS NOTES
Occupational Therapy  Patient treatment attempted this PM.  Patient laying in the bed. Family present. Pt declined to get OOB at this time. Reports \"I will get up shortly. Not now. \"  Nursing reports pt has been declining to get OOB for them as well. Will attempt at a later time.   Reynaldo TORRES/KEEGAN [id number]

## 2018-03-09 NOTE — ANESTHESIA PRE-OP
Department of Anesthesiology  Preprocedure Note       Name:  Lesvia Ernandez. Age:  80 y.o.  :  1950                                          MRN:  [id number]         Date:  3/9/2018      Surgeon:    Procedure:    Medications prior to admission:   Prior to Admission medications    Medication Sig Start Date End Date Taking?  Authorizing Provider   fluticasone (FLONASE) 50 MCG/ACT nasal spray 1 spray by Nasal route 2 times daily   Yes Historical Provider, MD   Multiple Vitamins-Minerals (THERAPEUTIC MULTIVITAMIN-MINERALS) tablet Take 1 tablet by mouth daily   Yes Historical Provider, MD   Dextromethorphan-guaiFENesin  MG/5ML SYRP Take 15 mLs by mouth every 4 hours as needed for Cough   Yes Historical Provider, MD   cloNIDine (CATAPRES) 0.1 MG/24HR Place 1 patch onto the skin once a week     Historical Provider, MD   mirtazapine (REMERON) 15 MG tablet Take 7.5 mg by mouth nightly    Historical Provider, MD   insulin glargine (LANTUS) 100 UNIT/ML injection vial Inject 10 Units into the skin nightly 18   Augusto Rosado DO   vitamin D (ERGOCALCIFEROL) 68985 UNITS capsule Take 1 capsule by mouth once a week  Patient taking differently: Take 50,000 Units by mouth once a week  9/15/16   HUMBERTO Saldana   donepezil (ARICEPT) 5 MG tablet Take 1 tablet by mouth nightly 9/15/16   HUMBERTO Howell   insulin lispro (HUMALOG) 100 UNIT/ML injection vial Inject 0-6 Units into the skin 3 times daily (with meals) Use low dose glucose algorithm 9/15/16   HUMBERTO Howell   atenolol (TENORMIN) 25 MG tablet Take 1 tablet by mouth daily  Patient taking differently: Take 50 mg by mouth daily  16   Nory Morillo MD   metFORMIN (GLUCOPHAGE) 1000 MG tablet Take 1 tablet by mouth 2 times daily (with meals) 16   Vani Tyler MD   magnesium oxide (MAG-OX) 400 (240 MG) MG tablet Take 1 tablet by mouth daily 16   Pernell Jensen DO   losartan (COZAAR) 25 MG tablet Take 1 tablet by mouth daily  Patient taking differently: Take 50 mg by mouth daily  4/9/16   Glendia Fabry, DO   acetaminophen 650 MG TABS Take 650 mg by mouth 3 times daily as needed 10/8/15   Tesfaye Hernández MD   atorvastatin (LIPITOR) 80 MG tablet Take 1 tablet by mouth nightly 10/8/15   Tesfaye Hernández MD   ammonium lactate (LAC-HYDRIN) 12 % lotion Apply to affected areas as needed 10/8/15   Tesfaye Hernández MD   cilostazol (PLETAL) 100 MG tablet Take 1 tablet by mouth 2 times daily 10/8/15   Tesfaye Hernández MD   clopidogrel (PLAVIX) 75 MG tablet Take 1 tablet by mouth daily 10/8/15   Tesfaye Hernández MD       Current medications:    Current Facility-Administered Medications   Medication Dose Route Frequency Provider Last Rate Last Dose    losartan (COZAAR) tablet 50 mg  50 mg Oral Daily Alex Navarro CNP        ammonium lactate (LAC-HYDRIN) 12 % lotion   Topical PRN Vinh Sanchez MD        atenolol (TENORMIN) tablet 50 mg  50 mg Oral Daily Vinh Sanchez MD   50 mg at 03/09/18 0853    atorvastatin (LIPITOR) tablet 80 mg  80 mg Oral Nightly Vinh Sanchez MD   80 mg at 03/08/18 2237    [START ON 3/12/2018] cloNIDine (CATAPRES) tablet 0.1 mg  0.1 mg Oral Daily Vinh Sanchez MD        guaiFENesin-dextromethorphan (ROBITUSSIN DM) 100-10 MG/5ML syrup 10 mL  10 mL Oral Q4H PRN Vinh Sanchez MD        donepezil (ARICEPT) tablet 5 mg  5 mg Oral Nightly Vinh Sanchez MD   5 mg at 03/08/18 2237    fluticasone (FLONASE) 50 MCG/ACT nasal spray 1 spray  1 spray Nasal BID Vinh Sanchez MD   1 spray at 03/09/18 0853    insulin glargine (LANTUS) injection vial 10 Units  10 Units Subcutaneous Nightly Vinh Sanchez MD   10 Units at 03/08/18 2231    insulin lispro (HUMALOG) injection vial 2-10 Units  2-10 Units Subcutaneous 4x Daily AC & HS Vinh Sanchez MD   2 Units at 03/08/18 2232    magnesium oxide (MAG-OX) tablet 400 mg  400 mg Oral Daily [facility] Thu Del Rio MD   400 mg at 03/08/18 5475    mirtazapine (REMERON) tablet 7.5 mg  7.5 mg Oral Nightly Yeny Terry MD   7.5 mg at 03/08/18 2237    therapeutic multivitamin-minerals 1 tablet  1 tablet Oral Daily Yeny Terry MD   1 tablet at 03/08/18 0833    ondansetron (ZOFRAN) injection 4 mg  4 mg Intravenous Q6H PRN Yeny Terry MD        0.9 % sodium chloride infusion   Intravenous Continuous Yeny Terry MD 75 mL/hr at 03/09/18 1224      pantoprazole (PROTONIX) injection 40 mg  40 mg Intravenous BID Yeny Terry MD   40 mg at 03/09/18 0853    glucose (GLUTOSE) 40 % oral gel 15 g  15 g Oral PRN Jefry Hric, DO        dextrose 50 % solution 12.5 g  12.5 g Intravenous PRN Jefry Hric, DO   12.5 g at 03/09/18 1218    glucagon (rDNA) injection 1 mg  1 mg Intramuscular PRN Jefry Hric, DO        dextrose 5 % solution  100 mL/hr Intravenous PRN Jefry Hric, DO        mupirocin (BACTROBAN) 2 % ointment   Topical Daily Maryam Arzola [facility]        collagenase ointment   Topical Daily Maryam Arzola [place]        sodium chloride flush 0.9 % injection 10 mL  10 mL Intravenous Q12H Ilean Gauze, CNP   10 mL at 03/09/18 0853    sodium chloride flush 0.9 % injection 10 mL  10 mL Intravenous 2 times per day Rosi Maier MD   10 mL at 03/07/18 2330    sodium chloride flush 0.9 % injection 10 mL  10 mL Intravenous PRN Rosi Maier MD   10 mL at 03/09/18 1154    acetaminophen (TYLENOL) tablet 650 mg  650 mg Oral Q4H PRN Rosi Maier MD           Allergies:  No Known Allergies    Problem List:    Patient Active Problem List   Diagnosis Code    DJD (degenerative joint disease) M19.90    Type II diabetes mellitus, uncontrolled ([facility] E11.65    Hyperlipoproteinemia E78.5    Neuropathy ([facility] G62.9    Depression F32.9    Tobacco dependence F17.200    Pain in lower limb M79.606    PVD (peripheral vascular disease) with claudication ([facility] I73.9 History:        Procedure Laterality Date    BACK SURGERY      CYSTOSCOPY  4/8/16    Removal of Bladder Stone    ECHO COMPL W DOP COLOR FLOW  5/18/2012         OTHER SURGICAL HISTORY  11/28/15    lap choley and cholangiogram    OTHER SURGICAL HISTORY  4/8/16    Suprapubic Catheter Insertion       Social History:    Social History   Substance Use Topics    Smoking status: Current Every Day Smoker     Packs/day: 0.10     Years: 50.00     Types: Cigarettes    Smokeless tobacco: Never Used    Alcohol use No                                Ready to quit: Not Answered  Counseling given: Not Answered      Vital Signs (Current):   Vitals:    03/09/18 0210 03/09/18 0746 03/09/18 1542 03/09/18 1546   BP:  (!) 173/82 (!) 206/93 (!) 189/75   Pulse:  61 54 55   Resp:  16 18 16   Temp:  98.5 °F (36.9 °C)     TempSrc:  Oral     SpO2:  100% 97% 96%   Weight: 157 lb 12.8 oz (71.6 kg)      Height:                                                  BP Readings from Last 3 Encounters:   03/09/18 (!) 189/75   01/24/18 102/60   01/20/17 140/78       NPO Status: Time of last liquid consumption: 2355                        Time of last solid consumption: 1000                        Date of last liquid consumption: 03/08/18                        Date of last solid food consumption: 03/07/18    BMI:   Wt Readings from Last 3 Encounters:   03/09/18 157 lb 12.8 oz (71.6 kg)   01/24/18 150 lb (68 kg)   01/16/17 145 lb 1.6 oz (65.8 kg)     Body mass index is 20.82 kg/m². Anesthesia Evaluation  Patient summary reviewed and Nursing notes reviewed  Airway: Mallampati: III  TM distance: >3 FB   Neck ROM: full  Mouth opening: > = 3 FB Dental:      Comment: Poor dentition    Pulmonary:normal exam                               Cardiovascular:  Exercise tolerance: poor (<4 METS),   (+) hypertension:,                   Neuro/Psych:   (+) CVA:,             GI/Hepatic/Renal:            ROS comment: GI bleed.    Endo/Other:    (+) Diabetes,

## 2018-03-10 LAB
ALBUMIN SERPL-MCNC: 2.9 G/DL (ref 3.5–5.2)
ALP BLD-CCNC: 108 U/L (ref 40–129)
ALT SERPL-CCNC: 7 U/L (ref 0–40)
ANION GAP SERPL CALCULATED.3IONS-SCNC: 10 MMOL/L (ref 7–16)
AST SERPL-CCNC: 12 U/L (ref 0–39)
BASOPHILS ABSOLUTE: 0.01 E9/L (ref 0–0.2)
BASOPHILS RELATIVE PERCENT: 0.2 % (ref 0–2)
BILIRUB SERPL-MCNC: 0.3 MG/DL (ref 0–1.2)
BUN BLDV-MCNC: 18 MG/DL (ref 8–23)
CALCIUM SERPL-MCNC: 8.5 MG/DL (ref 8.6–10.2)
CHLORIDE BLD-SCNC: 102 MMOL/L (ref 98–107)
CO2: 25 MMOL/L (ref 22–29)
CREAT SERPL-MCNC: 1.7 MG/DL (ref 0.7–1.2)
EOSINOPHILS ABSOLUTE: 0.68 E9/L (ref 0.05–0.5)
EOSINOPHILS RELATIVE PERCENT: 13.9 % (ref 0–6)
GFR AFRICAN AMERICAN: 49
GFR NON-AFRICAN AMERICAN: 49 ML/MIN/1.73
GLUCOSE BLD-MCNC: 156 MG/DL (ref 74–109)
HCT VFR BLD CALC: 23.9 % (ref 37–54)
HEMOGLOBIN: 7.7 G/DL (ref 12.5–16.5)
IMMATURE GRANULOCYTES #: 0.02 E9/L
IMMATURE GRANULOCYTES %: 0.4 % (ref 0–5)
LYMPHOCYTES ABSOLUTE: 1.95 E9/L (ref 1.5–4)
LYMPHOCYTES RELATIVE PERCENT: 39.9 % (ref 20–42)
MCH RBC QN AUTO: 30.3 PG (ref 26–35)
MCHC RBC AUTO-ENTMCNC: 32.2 % (ref 32–34.5)
MCV RBC AUTO: 94.1 FL (ref 80–99.9)
METER GLUCOSE: 137 MG/DL (ref 70–110)
METER GLUCOSE: 236 MG/DL (ref 70–110)
METER GLUCOSE: 251 MG/DL (ref 70–110)
METER GLUCOSE: 251 MG/DL (ref 70–110)
METER GLUCOSE: 270 MG/DL (ref 70–110)
MONOCYTES ABSOLUTE: 0.4 E9/L (ref 0.1–0.95)
MONOCYTES RELATIVE PERCENT: 8.2 % (ref 2–12)
NEUTROPHILS ABSOLUTE: 1.83 E9/L (ref 1.8–7.3)
NEUTROPHILS RELATIVE PERCENT: 37.4 % (ref 43–80)
PDW BLD-RTO: 17 FL (ref 11.5–15)
PLATELET # BLD: 637 E9/L (ref 130–450)
PMV BLD AUTO: 8.1 FL (ref 7–12)
POTASSIUM SERPL-SCNC: 4.5 MMOL/L (ref 3.5–5)
RBC # BLD: 2.54 E12/L (ref 3.8–5.8)
SODIUM BLD-SCNC: 137 MMOL/L (ref 132–146)
TOTAL PROTEIN: 5.9 G/DL (ref 6.4–8.3)
WBC # BLD: 4.9 E9/L (ref 4.5–11.5)
WOUND/ABSCESS: NORMAL

## 2018-03-10 PROCEDURE — 6370000000 HC RX 637 (ALT 250 FOR IP): Performed by: NURSE PRACTITIONER

## 2018-03-10 PROCEDURE — 6360000002 HC RX W HCPCS: Performed by: INTERNAL MEDICINE

## 2018-03-10 PROCEDURE — 6370000000 HC RX 637 (ALT 250 FOR IP): Performed by: INTERNAL MEDICINE

## 2018-03-10 PROCEDURE — 36415 COLL VENOUS BLD VENIPUNCTURE: CPT | Performed by: INTERNAL MEDICINE

## 2018-03-10 PROCEDURE — 2580000003 HC RX 258: Performed by: INTERNAL MEDICINE

## 2018-03-10 PROCEDURE — 82962 GLUCOSE BLOOD TEST: CPT

## 2018-03-10 PROCEDURE — 1200000000 HC SEMI PRIVATE

## 2018-03-10 PROCEDURE — 36415 COLL VENOUS BLD VENIPUNCTURE: CPT

## 2018-03-10 PROCEDURE — C9113 INJ PANTOPRAZOLE SODIUM, VIA: HCPCS | Performed by: INTERNAL MEDICINE

## 2018-03-10 PROCEDURE — 2580000003 HC RX 258: Performed by: NURSE PRACTITIONER

## 2018-03-10 RX ADMIN — Medication 400 MG: at 10:02

## 2018-03-10 RX ADMIN — MIRTAZAPINE 7.5 MG: 15 TABLET, FILM COATED ORAL at 20:28

## 2018-03-10 RX ADMIN — Medication 1 SPRAY: at 10:03

## 2018-03-10 RX ADMIN — Medication 10 ML: at 09:03

## 2018-03-10 RX ADMIN — DONEPEZIL HYDROCHLORIDE 5 MG: 5 TABLET, FILM COATED ORAL at 20:28

## 2018-03-10 RX ADMIN — PANTOPRAZOLE SODIUM 40 MG: 40 INJECTION, POWDER, FOR SOLUTION INTRAVENOUS at 20:28

## 2018-03-10 RX ADMIN — INSULIN LISPRO 4 UNITS: 100 INJECTION, SOLUTION INTRAVENOUS; SUBCUTANEOUS at 11:55

## 2018-03-10 RX ADMIN — INSULIN LISPRO 6 UNITS: 100 INJECTION, SOLUTION INTRAVENOUS; SUBCUTANEOUS at 18:18

## 2018-03-10 RX ADMIN — INSULIN LISPRO 6 UNITS: 100 INJECTION, SOLUTION INTRAVENOUS; SUBCUTANEOUS at 22:48

## 2018-03-10 RX ADMIN — ATENOLOL 50 MG: 50 TABLET ORAL at 10:02

## 2018-03-10 RX ADMIN — Medication 10 ML: at 20:28

## 2018-03-10 RX ADMIN — Medication 10 ML: at 10:02

## 2018-03-10 RX ADMIN — ATORVASTATIN CALCIUM 80 MG: 40 TABLET, FILM COATED ORAL at 20:28

## 2018-03-10 RX ADMIN — PANTOPRAZOLE SODIUM 40 MG: 40 INJECTION, POWDER, FOR SOLUTION INTRAVENOUS at 10:02

## 2018-03-10 RX ADMIN — Medication 1 SPRAY: at 20:27

## 2018-03-10 RX ADMIN — Medication 10 ML: at 20:32

## 2018-03-10 RX ADMIN — INSULIN GLARGINE 10 UNITS: 100 INJECTION, SOLUTION SUBCUTANEOUS at 22:49

## 2018-03-10 RX ADMIN — MULTIPLE VITAMINS W/ MINERALS TAB 1 TABLET: TAB at 10:02

## 2018-03-10 RX ADMIN — LOSARTAN POTASSIUM 50 MG: 50 TABLET, FILM COATED ORAL at 10:02

## 2018-03-10 RX ADMIN — Medication: at 10:03

## 2018-03-10 ASSESSMENT — PAIN SCALES - GENERAL
PAINLEVEL_OUTOF10: 0

## 2018-03-10 NOTE — OP NOTE
[id number] [address]                                 OPERATIVE REPORT    PATIENT NAME: Yvette Kat                       :        1950  MED REC NO:   [id number]                            ROOM:       0518  ACCOUNT NO:   [de-identified]                          ADMIT DATE: 2018  PROVIDER:     Mary Grace Barry MD    DATE OF PROCEDURE:  2018    PREOPERATIVE DIAGNOSIS:  Anemia. POSTOPERATIVE DIAGNOSES:  Grade A LA classification GERD, minimal  gastritis, biopsied to rule out H. pylori infection, severe duodenitis. PROCEDURE PERFORMED:  Upper endoscopy with biopsy. ANESTHESIA:  LMAC. NOTE:  Prior to the procedure an informed consent was obtained from the  patient after explaining the benefits as well as the risks, alternatives,  and complications of the procedure to the patient, who understood and  agreed. PROCEDURE:  With the patient in the left lateral decubitus position, the  Olympus GIF-100 forward-viewing videoscope was introduced into the  esophagus, the evaluation of which showed grade A LA classification GERD,  and no hiatal hernia was seen. The scope was then advanced through the gastroesophageal junction into the  gastric body, along the greater curvature. Evaluation of the body of the  stomach showed minimal gastritis. Biopsies were taken from this area to  rule out Helicobacter pylori infection. The scope was then advanced through the pylorus into the duodenal bulb and  second portion of the duodenum. Duodenum showed severe duodenitis. No  active bleeding seen. The scope was then retrieved and retroflexed in the  prepyloric antrum, with thorough evaluation of the cardiac and fundal  portions of the stomach, which appeared to be within normal limits.     The scope was then straightened, the area deflated, and the procedure was  terminated by withdrawing the scope and conducting a second look on the way  out, which was essentially the same. The patient tolerated the procedure well.         Estefania Cabrera MD    D: 03/09/2018 16:45:22       T: 03/09/2018 21:09:22     [email or web address]  Job#: [id number]     Doc#: [id number]    CC:  Nat Smith MD

## 2018-03-10 NOTE — PROGRESS NOTES
% solution PRN   mupirocin (BACTROBAN) 2 % ointment Daily   collagenase ointment Daily   sodium chloride flush 0.9 % injection 10 mL Q12H   sodium chloride flush 0.9 % injection 10 mL 2 times per day   sodium chloride flush 0.9 % injection 10 mL PRN   acetaminophen (TYLENOL) tablet 650 mg Q4H PRN        Data Review  CBC: Lab Results   Component Value Date    WBC 4.9 03/10/2018    RBC 2.54 03/10/2018    HGB 7.7 03/10/2018    HCT 23.9 03/10/2018    MCV 94.1 03/10/2018    MCH 30.3 03/10/2018    MCHC 32.2 03/10/2018    RDW 17.0 03/10/2018     03/10/2018    MPV 8.1 03/10/2018     CMP:  Lab Results   Component Value Date     03/10/2018    K 4.5 03/10/2018    K 5.7 03/08/2018     03/10/2018    CO2 25 03/10/2018    BUN 18 03/10/2018    CREATININE 1.7 03/10/2018    GFRAA 49 03/10/2018    LABGLOM 49 03/10/2018    GLUCOSE 156 03/10/2018    GLUCOSE 242 05/17/2012    PROT 5.9 03/10/2018    LABALBU 2.9 03/10/2018    LABALBU 4.2 05/17/2012    CALCIUM 8.5 03/10/2018    BILITOT 0.3 03/10/2018    ALKPHOS 108 03/10/2018    AST 12 03/10/2018    ALT 7 03/10/2018     Hepatic Function Panel:  Lab Results   Component Value Date    ALKPHOS 108 03/10/2018    ALT 7 03/10/2018    AST 12 03/10/2018    PROT 5.9 03/10/2018    BILITOT 0.3 03/10/2018    BILIDIR <0.2 12/22/2016    IBILI 0.0 12/22/2016    LABALBU 2.9 03/10/2018    LABALBU 4.2 05/17/2012     No components found for: CHLPL  Lab Results   Component Value Date    TRIG 113 05/28/2016    TRIG 62 09/21/2015    TRIG 183 (H) 01/10/2015     Lab Results   Component Value Date    HDL 49 05/28/2016    HDL 43 09/21/2015    HDL 40 01/10/2015     Lab Results   Component Value Date    LDLCALC 32 05/28/2016    LDLCALC 148 (H) 09/21/2015    LDLCALC 150 (H) 01/10/2015     Lab Results   Component Value Date    LABVLDL 23 05/28/2016    LABVLDL 12 09/21/2015    LABVLDL 37 01/10/2015      PT/INR:    Lab Results   Component Value Date    PROTIME 12.7 01/15/2017    PROTIME 11.1 05/17/2012 INR 1.2 01/15/2017     IRON:    Lab Results   Component Value Date    IRON 66 03/08/2018     Iron Saturation:  No components found for: PERCENTFE  FERRITIN:    Lab Results   Component Value Date    FERRITIN 53 03/08/2018         Assessment:     Principal Problem:    Acute upper GI bleed  Active Problems:  ? Anemia, normocytic, normochromic felt secondary to blood loss - FOBT (+)--S/P transfusion with 2 U PRBC's  ? Hx ETOH abuse  ? Diabetes Mellitus, type 2  ? PVD and CVA currently on Plavix and Pletal  ? TERESITA  ? Grade A LA classification GERD, minimal gastritis, biopsy pending to rule out H. pylori infection, severe duodenitis (EGD 3/9/18). Plan:     Colonoscopy Monday with Dr. Jadon Choi. Procedure details for Colonoscopy discussed in detail. Complications including but not limited to, perforation, bleeding and infection were discussed in great detail. Risks, benefits, and alternatives explained. Pt has understood the information and has agreed to proceed. ? Continue Protonix as ordered  ? Continue to monitor CBC, CMP daily, transfuse per PCP  ? Defer co-morbidities to others  ?  Continue to follow    Discussed with Dr. Justina Peace per Dr. Husam Angulo [facility] 3/10/2018 7:58 AM For Dr. Jadon Choi

## 2018-03-11 ENCOUNTER — ANESTHESIA EVENT (OUTPATIENT)
Dept: ENDOSCOPY | Age: 68
DRG: 244 | End: 2018-03-11
Payer: MEDICAID

## 2018-03-11 ENCOUNTER — ANESTHESIA (OUTPATIENT)
Dept: ENDOSCOPY | Age: 68
DRG: 244 | End: 2018-03-11
Payer: MEDICAID

## 2018-03-11 LAB
ALBUMIN SERPL-MCNC: 3.1 G/DL (ref 3.5–5.2)
ALP BLD-CCNC: 110 U/L (ref 40–129)
ALT SERPL-CCNC: 8 U/L (ref 0–40)
ANION GAP SERPL CALCULATED.3IONS-SCNC: 11 MMOL/L (ref 7–16)
APTT: 45 SEC (ref 24.5–35.1)
AST SERPL-CCNC: 11 U/L (ref 0–39)
BACTERIA: ABNORMAL /HPF
BILIRUB SERPL-MCNC: 0.2 MG/DL (ref 0–1.2)
BILIRUBIN URINE: NEGATIVE
BLOOD, URINE: NEGATIVE
BUN BLDV-MCNC: 28 MG/DL (ref 8–23)
CALCIUM SERPL-MCNC: 8.6 MG/DL (ref 8.6–10.2)
CHLORIDE BLD-SCNC: 102 MMOL/L (ref 98–107)
CLARITY: CLEAR
CLOTEST: NORMAL
CO2: 25 MMOL/L (ref 22–29)
COLOR: YELLOW
CREAT SERPL-MCNC: 1.8 MG/DL (ref 0.7–1.2)
EPITHELIAL CELLS, UA: ABNORMAL /HPF
GFR AFRICAN AMERICAN: 46
GFR NON-AFRICAN AMERICAN: 46 ML/MIN/1.73
GLUCOSE BLD-MCNC: 96 MG/DL (ref 74–109)
GLUCOSE URINE: NEGATIVE MG/DL
HCT VFR BLD CALC: 26.4 % (ref 37–54)
HEMOGLOBIN: 8.2 G/DL (ref 12.5–16.5)
INR BLD: 1.2
KETONES, URINE: NEGATIVE MG/DL
LEUKOCYTE ESTERASE, URINE: ABNORMAL
MCH RBC QN AUTO: 29.7 PG (ref 26–35)
MCHC RBC AUTO-ENTMCNC: 31.1 % (ref 32–34.5)
MCV RBC AUTO: 95.7 FL (ref 80–99.9)
METER GLUCOSE: 103 MG/DL (ref 70–110)
METER GLUCOSE: 143 MG/DL (ref 70–110)
METER GLUCOSE: 380 MG/DL (ref 70–110)
METER GLUCOSE: 93 MG/DL (ref 70–110)
NITRITE, URINE: POSITIVE
PDW BLD-RTO: 16.8 FL (ref 11.5–15)
PH UA: 5.5 (ref 5–9)
PLATELET # BLD: 620 E9/L (ref 130–450)
PMV BLD AUTO: 8.1 FL (ref 7–12)
POTASSIUM SERPL-SCNC: 4.6 MMOL/L (ref 3.5–5)
PROTEIN UA: NEGATIVE MG/DL
PROTHROMBIN TIME: 13 SEC (ref 9.3–12.4)
RBC # BLD: 2.76 E12/L (ref 3.8–5.8)
RBC UA: ABNORMAL /HPF (ref 0–2)
SODIUM BLD-SCNC: 138 MMOL/L (ref 132–146)
SPECIFIC GRAVITY UA: 1.01 (ref 1–1.03)
TOTAL PROTEIN: 6.1 G/DL (ref 6.4–8.3)
UROBILINOGEN, URINE: 0.2 E.U./DL
WBC # BLD: 4.5 E9/L (ref 4.5–11.5)
WBC UA: ABNORMAL /HPF (ref 0–5)
WOUND/ABSCESS: NORMAL
YEAST: ABNORMAL

## 2018-03-11 PROCEDURE — 6370000000 HC RX 637 (ALT 250 FOR IP): Performed by: INTERNAL MEDICINE

## 2018-03-11 PROCEDURE — 85027 COMPLETE CBC AUTOMATED: CPT

## 2018-03-11 PROCEDURE — 82962 GLUCOSE BLOOD TEST: CPT

## 2018-03-11 PROCEDURE — 2580000003 HC RX 258: Performed by: NURSE PRACTITIONER

## 2018-03-11 PROCEDURE — 85610 PROTHROMBIN TIME: CPT

## 2018-03-11 PROCEDURE — C9113 INJ PANTOPRAZOLE SODIUM, VIA: HCPCS | Performed by: INTERNAL MEDICINE

## 2018-03-11 PROCEDURE — 36415 COLL VENOUS BLD VENIPUNCTURE: CPT

## 2018-03-11 PROCEDURE — 81015 MICROSCOPIC EXAM OF URINE: CPT

## 2018-03-11 PROCEDURE — 2580000003 HC RX 258: Performed by: INTERNAL MEDICINE

## 2018-03-11 PROCEDURE — 85730 THROMBOPLASTIN TIME PARTIAL: CPT

## 2018-03-11 PROCEDURE — 81003 URINALYSIS AUTO W/O SCOPE: CPT

## 2018-03-11 PROCEDURE — 80053 COMPREHEN METABOLIC PANEL: CPT

## 2018-03-11 PROCEDURE — 1200000000 HC SEMI PRIVATE

## 2018-03-11 PROCEDURE — 6360000002 HC RX W HCPCS: Performed by: INTERNAL MEDICINE

## 2018-03-11 PROCEDURE — 82570 ASSAY OF URINE CREATININE: CPT

## 2018-03-11 PROCEDURE — 6370000000 HC RX 637 (ALT 250 FOR IP): Performed by: CLINICAL NURSE SPECIALIST

## 2018-03-11 PROCEDURE — 82044 UR ALBUMIN SEMIQUANTITATIVE: CPT

## 2018-03-11 PROCEDURE — 6370000000 HC RX 637 (ALT 250 FOR IP): Performed by: NURSE PRACTITIONER

## 2018-03-11 RX ORDER — ERGOCALCIFEROL 1.25 MG/1
50000 CAPSULE ORAL WEEKLY
Status: DISCONTINUED | OUTPATIENT
Start: 2018-03-12 | End: 2018-03-21 | Stop reason: HOSPADM

## 2018-03-11 RX ORDER — SODIUM CHLORIDE 9 MG/ML
INJECTION, SOLUTION INTRAVENOUS CONTINUOUS
Status: DISCONTINUED | OUTPATIENT
Start: 2018-03-11 | End: 2018-03-14

## 2018-03-11 RX ADMIN — Medication 10 ML: at 08:58

## 2018-03-11 RX ADMIN — MAGNESIUM CITRATE 296 ML: 1.75 LIQUID ORAL at 12:15

## 2018-03-11 RX ADMIN — PANTOPRAZOLE SODIUM 40 MG: 40 INJECTION, POWDER, FOR SOLUTION INTRAVENOUS at 21:01

## 2018-03-11 RX ADMIN — Medication: at 08:58

## 2018-03-11 RX ADMIN — ATORVASTATIN CALCIUM 80 MG: 40 TABLET, FILM COATED ORAL at 21:03

## 2018-03-11 RX ADMIN — SODIUM CHLORIDE: 9 INJECTION, SOLUTION INTRAVENOUS at 20:58

## 2018-03-11 RX ADMIN — PANTOPRAZOLE SODIUM 40 MG: 40 INJECTION, POWDER, FOR SOLUTION INTRAVENOUS at 08:56

## 2018-03-11 RX ADMIN — INSULIN LISPRO 10 UNITS: 100 INJECTION, SOLUTION INTRAVENOUS; SUBCUTANEOUS at 11:51

## 2018-03-11 RX ADMIN — ATENOLOL 50 MG: 50 TABLET ORAL at 08:56

## 2018-03-11 RX ADMIN — Medication 10 ML: at 21:01

## 2018-03-11 RX ADMIN — LOSARTAN POTASSIUM 50 MG: 50 TABLET, FILM COATED ORAL at 08:56

## 2018-03-11 RX ADMIN — DONEPEZIL HYDROCHLORIDE 5 MG: 5 TABLET, FILM COATED ORAL at 21:03

## 2018-03-11 RX ADMIN — INSULIN GLARGINE 10 UNITS: 100 INJECTION, SOLUTION SUBCUTANEOUS at 21:04

## 2018-03-11 RX ADMIN — MULTIPLE VITAMINS W/ MINERALS TAB 1 TABLET: TAB at 08:56

## 2018-03-11 RX ADMIN — MIRTAZAPINE 7.5 MG: 15 TABLET, FILM COATED ORAL at 21:03

## 2018-03-11 RX ADMIN — BISACODYL 30 MG: 5 TABLET, COATED ORAL at 16:58

## 2018-03-11 RX ADMIN — Medication 400 MG: at 08:56

## 2018-03-11 RX ADMIN — Medication 10 ML: at 08:56

## 2018-03-11 ASSESSMENT — PAIN SCALES - GENERAL
PAINLEVEL_OUTOF10: 0

## 2018-03-11 ASSESSMENT — LIFESTYLE VARIABLES: SMOKING_STATUS: 1

## 2018-03-11 NOTE — PROGRESS NOTES
Follow-Up  Podiatry Wound Progress Note  Emre Stuart AGE: 80 y.o. GENDER: male  : 1950  TODAY'S DATE:  3/11/2018  Subjective:    Emre Stuart is a 80 y.o. male who presents today for wound check.   Wound Etiology :diabetic foot ulcer  Wound Location :  left and right  Pain Level: 0   Pain Assessment: 0-10    Abx : Present     Patient Active Problem List   Diagnosis Code    DJD (degenerative joint disease) M19.90    Type II diabetes mellitus, uncontrolled ([facility] E11.65    Hyperlipoproteinemia E78.5    Neuropathy ([facility] [address] G62.9    Depression F32.9    Tobacco dependence F17.200    Pain in lower limb M79.606    PVD (peripheral vascular disease) with claudication ([facility]) I73.9    Onychomycosis B35.1    Atherosclerosis of native arteries of extremity with rest pain ([facility]) I70.229    Right sided weakness R53.1    Atherosclerosis of native artery of right lower extremity with rest pain ([facility]) I70.221    Pulmonary nodule R91.1    DKA (diabetic ketoacidoses) ([facility]) E13.10    Diabetic ulcer of right foot associated with type 2 diabetes mellitus ([facility]) E11.621, L97.519    UTI (urinary tract infection) N39.0    DKA (diabetic ketoacidoses) ([facility]) E13.10    Disorientation R41.0    Hyponatremia E87.1    Sepsis ([facility]) A41.9    HTN (hypertension) I10    Suprapubic catheter ([facility]) Z93.59    Acute upper GI bleed K92.2    Acute blood loss anemia D62    TERESITA (acute kidney injury) ([facility] [address] N17.9    Hyperkalemia, mild E87.5    DM2 (diabetes mellitus, type 2) ([facility]) E11.9    Hyperlipidemia E78.5    Chronic arterial ischemic stroke, with residual expressive aphasia etc I69.30    Possible dementia F03.90    Incontinence of urine (now s/p SPC) and stool R32    Former smoker Z87.891    Non-healing ulcer ([facility] [address] - of left hallux L98.499        Objective:    BP (!) 144/63   Pulse 59   Temp 98.6 °F (37 °C) (Oral)   Resp 16   Ht 6' 1\" (1.854 m)   Wt 160 lb 5 oz (72.7 kg)   SpO2 98%   BMI 21.15 kg/m² visit. Wound 03/07/18 Left left great toe distal-Wound Length (cm): 2 cm  Wound 03/08/18 right great toe medial-Wound Length (cm): 2 cm  Wound 03/08/18 right 2nd medial toe-Wound Length (cm): 3 cm  Wound 03/07/18 Left left great toe distal-Wound Width (cm): 2 cm  Wound 03/08/18 right great toe medial-Wound Width (cm): 1 cm  Wound 03/08/18 right 2nd medial toe-Wound Width (cm): 0.4 cm  Wound 03/07/18 Left left great toe distal-Wound Depth (cm) : obs  Wound 03/08/18 right great toe medial-Wound Depth (cm) : 0.2  Wound 03/08/18 right 2nd medial toe-Wound Depth (cm) : obs (dark red, dry)      Pre Debridement Measurements:  Wound 03/07/18 Left left great toe distal (Active)   Wound Type Wound 3/9/2018 10:21 AM   Dressing Status Clean;Dry; Intact 3/9/2018 10:21 AM   Dressing Changed Changed/New 3/9/2018 10:21 AM   Dressing/Treatment Pharmaceutical agent (see eMar);4x4;Dry dressing 3/9/2018 10:21 AM   Wound Cleansed Rinsed/Irrigated with saline 3/9/2018 10:21 AM   Dressing Change Due 03/10/18 3/9/2018 10:21 AM   Wound Length (cm) 2 cm 3/8/2018  3:28 PM   Wound Width (cm) 2 cm 3/8/2018  3:28 PM   Wound Depth (cm)  obs 3/8/2018  3:28 PM   Calculated Wound Size (cm^2) (l*w) 4 cm^2 3/8/2018  3:28 PM   Change in Wound Size % (l*w) -38.89 3/8/2018  3:28 PM   Wound Assessment White;Yellow 3/9/2018 10:21 AM   Drainage Amount Scant 3/9/2018 10:21 AM   Drainage Description Tan 3/9/2018 10:21 AM   Odor None 3/9/2018 10:21 AM   Louise-wound Assessment Calloused;Dry 3/9/2018 10:21 AM   Culture Taken Yes 3/8/2018 10:45 PM   Number of days: 1       Wound 03/08/18 right great toe medial (Active)   Wound Type Wound 3/8/2018 10:45 PM   Dressing Status Clean;Dry; Intact 3/9/2018 10:21 AM   Dressing Changed Changed/New 3/9/2018 10:21 AM   Dressing/Treatment Pharmaceutical agent (see eMar);4x4;Dry dressing 3/9/2018 10:21 AM   Wound Cleansed Rinsed/Irrigated with saline 3/9/2018 10:21 AM   Dressing Change Due 03/10/18 3/9/2018 10:21 AM   Wound Length (cm) 2 cm 3/8/2018  3:28 PM   Wound Width (cm) 1 cm 3/8/2018  3:28 PM   Wound Depth (cm)  0.2 3/8/2018  3:28 PM   Calculated Wound Size (cm^2) (l*w) 2 cm^2 3/8/2018  3:28 PM   Wound Assessment Brown;Drainage 3/9/2018 10:21 AM   Drainage Amount Scant 3/9/2018 10:21 AM   Drainage Description Tan 3/9/2018 10:21 AM   Odor None 3/9/2018 10:21 AM   Louise-wound Assessment Fragile 3/9/2018 10:21 AM   Culture Taken Yes 3/8/2018 10:45 PM   Number of days: 0       Wound 03/08/18 right 2nd medial toe (Active)   Dressing Status Clean;Dry; Intact 3/9/2018 10:21 AM   Dressing Changed Changed/New 3/9/2018 10:21 AM   Dressing/Treatment Pharmaceutical agent (see eMar); Other (Comment);4x4 3/9/2018 10:21 AM   Wound Cleansed Rinsed/Irrigated with saline 3/9/2018 10:21 AM   Dressing Change Due 03/10/18 3/9/2018 10:21 AM   Wound Length (cm) 3 cm 3/8/2018  3:28 PM   Wound Width (cm) 0.4 cm 3/8/2018  3:28 PM   Wound Depth (cm)  obs 3/8/2018  3:28 PM   Calculated Wound Size (cm^2) (l*w) 1.2 cm^2 3/8/2018  3:28 PM   Wound Assessment Fragile 3/9/2018 10:21 AM   Drainage Amount Scant 3/9/2018 10:21 AM   Drainage Description Tan 3/9/2018 10:21 AM   Number of days: 0      The patients pain isPain Level: 0 Pain Type: Acute pain. Wound is has slightly improved. Assessment:   Please refer to nursing measurements and assessment regarding wound measurements. Wound check - Care provided includes removal of existing dressing and visual inspection      Overall Wound Assessment  Wound is has slightly improved. Size has decreased  Appearance has improved  CONCLUSION:       1. Soft tissue ulceration/defect at the tip of the great toe with what   appears to be exposed bone. There is no associated fracture, bone   erosion or acute periosteal reaction. Recommend correlation with   physical examination. 2. Diffuse osteopenia.    3. Soft tissue swelling predominantly along the dorsum of the   forefoot.         Narrative   Patient MRN:  [id number] digit    Electronically signed by Elva Lozoya DPM on 3/11/2018 at 7:53 PM

## 2018-03-11 NOTE — PROGRESS NOTES
[facility] Hospitalist Progress Note    Subjective:    Patient resting in bed in no acute distress  Reporting nausea with \" upset stomach\"  Planning for colonoscopy tomorrow  No family at bedside   Denies sob or pain    RoS: No fever, chills, headache, lightheadedness.  No other new ENT, RS, CVS, GI or  sx.     bisacodyl  30 mg Oral Once    [START ON 3/12/2018] magnesium citrate  296 mL Oral Once    losartan  50 mg Oral Daily    atenolol  50 mg Oral Daily    atorvastatin  80 mg Oral Nightly    [START ON 3/12/2018] cloNIDine  0.1 mg Oral Daily    donepezil  5 mg Oral Nightly    fluticasone  1 spray Nasal BID    insulin glargine  10 Units Subcutaneous Nightly    insulin lispro  2-10 Units Subcutaneous 4x Daily AC & HS    magnesium oxide  400 mg Oral Daily    mirtazapine  7.5 mg Oral Nightly    therapeutic multivitamin-minerals  1 tablet Oral Daily    pantoprazole  40 mg Intravenous BID    mupirocin   Topical Daily    collagenase   Topical Daily    sodium chloride flush  10 mL Intravenous Q12H    sodium chloride flush  10 mL Intravenous 2 times per day       ammonium lactate  PRN   guaiFENesin-dextromethorphan 10 mL Q4H PRN   ondansetron 4 mg Q6H PRN   glucose 15 g PRN   dextrose 12.5 g PRN   glucagon (rDNA) 1 mg PRN   dextrose 100 mL/hr PRN   sodium chloride flush 10 mL PRN   acetaminophen 650 mg Q4H PRN        Objective:    BP (!) 198/72   Pulse 58   Temp 97.5 °F (36.4 °C) (Oral)   Resp 16   Ht 6' 1\" (1.854 m)   Wt 160 lb 5 oz (72.7 kg)   SpO2 99%   BMI 21.15 kg/m²   General Appearance: alert and oriented to person, place and time-intermittent confusion, well developed and well nourished, in no acute distress  Skin: warm and dry, no rash or erythema, left big toe-with DSD   Head: normocephalic and atraumatic  Neck: supple and non-tender without mass  Pulmonary/Chest: clear to auscultation bilaterally-  Cardiovascular: normal rate, regular rhythm, normal S1 and S2  Abdomen: soft, non-tender,

## 2018-03-11 NOTE — PROGRESS NOTES
PROGRESS NOTE    Patient Presents with/Seen in Consultation For      *Upper GI bleed--big drop in Hgb, FOBT positive in ER  CHIEF COMPLAINT:  Low H/H on blood work at [facility]    Subjective:     Patient states he feels good. Pt denies abd pain, n/v, or diarrhea. States he tolerated diet yesterday. D/W pt clears today due to colonoscopy for tomorrow, he agrees. Review of Systems  Aside from what was mentioned in the PMH and HPI, essentially unremarkable, all others negative. Objective:     BP (!) 158/73   Pulse 68   Temp 97.9 °F (36.6 °C) (Oral)   Resp 16   Ht 6' 1\" (1.854 m)   Wt 160 lb 5 oz (72.7 kg)   SpO2 98%   BMI 21.15 kg/m²     General appearance: alert, sleeping, opens eyes to name, laying in bed, and cooperative  Eyes: conjunctiva pale, corneas clear. PERRL.   Lungs: diminished to auscultation bilaterally  Heart: regular rate and rhythm, no murmur, 2+ pulses;  2+ BLE edema  Abdomen: soft, non-tender; bowel sounds normal; no masses,  no organomegaly  Extremities: extremities with 2+ BLE edema  Pulses: 2+ and symmetric  Skin: Skin color pale, texture, turgor normal.   Neurologic: Grossly normal      bisacodyl (DULCOLAX) EC tablet 30 mg Once   magnesium citrate solution 300 mL BID   [START ON 3/12/2018] magnesium citrate solution 296 mL Once   losartan (COZAAR) tablet 50 mg Daily   ammonium lactate (LAC-HYDRIN) 12 % lotion PRN   atenolol (TENORMIN) tablet 50 mg Daily   atorvastatin (LIPITOR) tablet 80 mg Nightly   [START ON 3/12/2018] cloNIDine (CATAPRES) tablet 0.1 mg Daily   guaiFENesin-dextromethorphan (ROBITUSSIN DM) 100-10 MG/5ML syrup 10 mL Q4H PRN   donepezil (ARICEPT) tablet 5 mg Nightly   fluticasone (FLONASE) 50 MCG/ACT nasal spray 1 spray BID   insulin glargine (LANTUS) injection vial 10 Units Nightly   insulin lispro (HUMALOG) injection vial 2-10 Units 4x Daily AC & HS   magnesium oxide (MAG-OX) tablet 400 mg Daily   mirtazapine (REMERON) tablet 7.5 mg Nightly   therapeutic multivitamin-minerals 1 tablet Daily   ondansetron (ZOFRAN) injection 4 mg Q6H PRN   pantoprazole (PROTONIX) injection 40 mg BID   glucose (GLUTOSE) 40 % oral gel 15 g PRN   dextrose 50 % solution 12.5 g PRN   glucagon (rDNA) injection 1 mg PRN   dextrose 5 % solution PRN   mupirocin (BACTROBAN) 2 % ointment Daily   collagenase ointment Daily   sodium chloride flush 0.9 % injection 10 mL Q12H   sodium chloride flush 0.9 % injection 10 mL 2 times per day   sodium chloride flush 0.9 % injection 10 mL PRN   acetaminophen (TYLENOL) tablet 650 mg Q4H PRN        Data Review  CBC:   Lab Results   Component Value Date    WBC 4.9 03/10/2018    RBC 2.54 03/10/2018    HGB 7.7 03/10/2018    HCT 23.9 03/10/2018    MCV 94.1 03/10/2018    MCH 30.3 03/10/2018    MCHC 32.2 03/10/2018    RDW 17.0 03/10/2018     03/10/2018    MPV 8.1 03/10/2018     CMP:    Lab Results   Component Value Date     03/10/2018    K 4.5 03/10/2018    K 5.7 03/08/2018     03/10/2018    CO2 25 03/10/2018    BUN 18 03/10/2018    CREATININE 1.7 03/10/2018    GFRAA 49 03/10/2018    LABGLOM 49 03/10/2018    GLUCOSE 156 03/10/2018    GLUCOSE 242 05/17/2012    PROT 5.9 03/10/2018    LABALBU 2.9 03/10/2018    LABALBU 4.2 05/17/2012    CALCIUM 8.5 03/10/2018    BILITOT 0.3 03/10/2018    ALKPHOS 108 03/10/2018    AST 12 03/10/2018    ALT 7 03/10/2018     Hepatic Function Panel:    Lab Results   Component Value Date    ALKPHOS 108 03/10/2018    ALT 7 03/10/2018    AST 12 03/10/2018    PROT 5.9 03/10/2018    BILITOT 0.3 03/10/2018    BILIDIR <0.2 12/22/2016    IBILI 0.0 12/22/2016    LABALBU 2.9 03/10/2018    LABALBU 4.2 05/17/2012     No components found for: CHLPL    Lab Results   Component Value Date    TRIG 113 05/28/2016    TRIG 62 09/21/2015    TRIG 183 (H) 01/10/2015       Lab Results   Component Value Date    HDL 49 05/28/2016    HDL 43 09/21/2015    HDL 40 01/10/2015       Lab Results   Component Value Date    LDLCALC 32 05/28/2016    [facility] 148 (H) 09/21/2015    LDLCALC 150 (H) 01/10/2015       Lab Results   Component Value Date    LABVLDL 23 05/28/2016    LABVLDL 12 09/21/2015    LABVLDL 37 01/10/2015      PT/INR:    Lab Results   Component Value Date    PROTIME 12.7 01/15/2017    PROTIME 11.1 05/17/2012    INR 1.2 01/15/2017     IRON:    Lab Results   Component Value Date    IRON 66 03/08/2018     Iron Saturation:  No components found for: PERCENTFE  FERRITIN:    Lab Results   Component Value Date    FERRITIN 53 03/08/2018         Assessment:     Principal Problem:    Acute upper GI bleed  Active Problems:  ? Anemia, normocytic, normochromic felt secondary to blood loss - FOBT (+)--S/P transfusion with 2 U PRBC's  ? Hx ETOH abuse  ? Diabetes Mellitus, type 2  ? PVD and CVA currently on Plavix and Pletal  ? TERESITA  ? Grade A LA classification GERD, minimal gastritis, biopsy pending to rule out H. pylori infection, severe duodenitis (EGD 3/9/18). Plan:     Colonoscopy tomorrow with Dr. Cee Hernandez. All additional questions answered. ? Continue Protonix as ordered  ? Continue to monitor CBC, CMP daily, transfuse per PCP  ? Defer co-morbidities to others  ?  Continue to follow    Discussed with Dr. Laxmi Sánchez per Dr. Nery Uribe [facility] 3/11/2018 6:51 AM For Dr. Cee Hernandez

## 2018-03-11 NOTE — ANESTHESIA PRE PROCEDURE
Department of Anesthesiology  Preprocedure Note       Name:  Delores Wilson. Age:  80 y.o.  :  1950                                          MRN:  [id number]         Date:  3/11/2018      Surgeon: Dion Hunter    Procedure:  colonoscopy    Medications prior to admission:   Prior to Admission medications    Medication Sig Start Date End Date Taking?  Authorizing Provider   fluticasone (FLONASE) 50 MCG/ACT nasal spray 1 spray by Nasal route 2 times daily   Yes Historical Provider, MD   Multiple Vitamins-Minerals (THERAPEUTIC MULTIVITAMIN-MINERALS) tablet Take 1 tablet by mouth daily   Yes Historical Provider, MD   Dextromethorphan-guaiFENesin  MG/5ML SYRP Take 15 mLs by mouth every 4 hours as needed for Cough   Yes Historical Provider, MD   cloNIDine (CATAPRES) 0.1 MG/24HR Place 1 patch onto the skin once a week     Historical Provider, MD   mirtazapine (REMERON) 15 MG tablet Take 7.5 mg by mouth nightly    Historical Provider, MD   insulin glargine (LANTUS) 100 UNIT/ML injection vial Inject 10 Units into the skin nightly 18   [facility], DO   vitamin D (ERGOCALCIFEROL) 24605 UNITS capsule Take 1 capsule by mouth once a week  Patient taking differently: Take 50,000 Units by mouth once a week  9/15/16   HUMBERTO Casiano   donepezil (ARICEPT) 5 MG tablet Take 1 tablet by mouth nightly 9/15/16   [facility] HUMBERTO Nuñez   insulin lispro (HUMALOG) 100 UNIT/ML injection vial Inject 0-6 Units into the skin 3 times daily (with meals) Use low dose glucose algorithm 9/15/16   [facility] HUMBERTO Nuñez   atenolol (TENORMIN) 25 MG tablet Take 1 tablet by mouth daily  Patient taking differently: Take 50 mg by mouth daily  16   Matthew Santiago MD   metFORMIN (GLUCOPHAGE) 1000 MG tablet Take 1 tablet by mouth 2 times daily (with meals) 16   Joselito Ma MD   magnesium oxide (MAG-OX) 400 (240 MG) MG tablet Take 1 tablet by mouth daily 16   Audelia Tran,    losartan (COZAAR) 25

## 2018-03-12 ENCOUNTER — APPOINTMENT (OUTPATIENT)
Dept: ULTRASOUND IMAGING | Age: 68
DRG: 244 | End: 2018-03-12
Payer: MEDICAID

## 2018-03-12 VITALS — SYSTOLIC BLOOD PRESSURE: 149 MMHG | DIASTOLIC BLOOD PRESSURE: 67 MMHG | OXYGEN SATURATION: 100 %

## 2018-03-12 LAB
ALBUMIN SERPL-MCNC: 3.5 G/DL (ref 3.5–5.2)
ALP BLD-CCNC: 125 U/L (ref 40–129)
ALT SERPL-CCNC: 8 U/L (ref 0–40)
ANION GAP SERPL CALCULATED.3IONS-SCNC: 10 MMOL/L (ref 7–16)
AST SERPL-CCNC: 13 U/L (ref 0–39)
BILIRUB SERPL-MCNC: 0.3 MG/DL (ref 0–1.2)
BUN BLDV-MCNC: 24 MG/DL (ref 8–23)
CALCIUM SERPL-MCNC: 9.2 MG/DL (ref 8.6–10.2)
CHLORIDE BLD-SCNC: 106 MMOL/L (ref 98–107)
CO2: 28 MMOL/L (ref 22–29)
CREAT SERPL-MCNC: 1.8 MG/DL (ref 0.7–1.2)
CREATININE URINE: 57 MG/DL (ref 40–278)
GFR AFRICAN AMERICAN: 46
GFR NON-AFRICAN AMERICAN: 46 ML/MIN/1.73
GLUCOSE BLD-MCNC: 71 MG/DL (ref 74–109)
METER GLUCOSE: 177 MG/DL (ref 70–110)
METER GLUCOSE: 255 MG/DL (ref 70–110)
METER GLUCOSE: 67 MG/DL (ref 70–110)
METER GLUCOSE: 96 MG/DL (ref 70–110)
METER GLUCOSE: 99 MG/DL (ref 70–110)
MICROALBUMIN UR-MCNC: 18.7 MG/L
MICROALBUMIN/CREAT UR-RTO: 32.8 (ref 0–30)
PARATHYROID HORMONE INTACT: 91 PG/ML (ref 15–65)
POTASSIUM SERPL-SCNC: 4.9 MMOL/L (ref 3.5–5)
SODIUM BLD-SCNC: 144 MMOL/L (ref 132–146)
TOTAL PROTEIN: 7.1 G/DL (ref 6.4–8.3)

## 2018-03-12 PROCEDURE — 6370000000 HC RX 637 (ALT 250 FOR IP): Performed by: INTERNAL MEDICINE

## 2018-03-12 PROCEDURE — 3609009500 HC COLONOSCOPY DIAGNOSTIC OR SCREENING: Performed by: INTERNAL MEDICINE

## 2018-03-12 PROCEDURE — 36415 COLL VENOUS BLD VENIPUNCTURE: CPT

## 2018-03-12 PROCEDURE — 2580000003 HC RX 258: Performed by: INTERNAL MEDICINE

## 2018-03-12 PROCEDURE — 6370000000 HC RX 637 (ALT 250 FOR IP): Performed by: CLINICAL NURSE SPECIALIST

## 2018-03-12 PROCEDURE — 2580000003 HC RX 258: Performed by: NURSE ANESTHETIST, CERTIFIED REGISTERED

## 2018-03-12 PROCEDURE — 76770 US EXAM ABDO BACK WALL COMP: CPT

## 2018-03-12 PROCEDURE — 3700000001 HC ADD 15 MINUTES (ANESTHESIA): Performed by: INTERNAL MEDICINE

## 2018-03-12 PROCEDURE — 1200000000 HC SEMI PRIVATE

## 2018-03-12 PROCEDURE — 6360000002 HC RX W HCPCS: Performed by: NURSE ANESTHETIST, CERTIFIED REGISTERED

## 2018-03-12 PROCEDURE — 83970 ASSAY OF PARATHORMONE: CPT

## 2018-03-12 PROCEDURE — 82962 GLUCOSE BLOOD TEST: CPT

## 2018-03-12 PROCEDURE — 7100000011 HC PHASE II RECOVERY - ADDTL 15 MIN: Performed by: INTERNAL MEDICINE

## 2018-03-12 PROCEDURE — 6360000002 HC RX W HCPCS: Performed by: INTERNAL MEDICINE

## 2018-03-12 PROCEDURE — 0DJD8ZZ INSPECTION OF LOWER INTESTINAL TRACT, VIA NATURAL OR ARTIFICIAL OPENING ENDOSCOPIC: ICD-10-PCS | Performed by: INTERNAL MEDICINE

## 2018-03-12 PROCEDURE — C9113 INJ PANTOPRAZOLE SODIUM, VIA: HCPCS | Performed by: INTERNAL MEDICINE

## 2018-03-12 PROCEDURE — 80053 COMPREHEN METABOLIC PANEL: CPT

## 2018-03-12 PROCEDURE — 7100000010 HC PHASE II RECOVERY - FIRST 15 MIN: Performed by: INTERNAL MEDICINE

## 2018-03-12 PROCEDURE — 3700000000 HC ANESTHESIA ATTENDED CARE: Performed by: INTERNAL MEDICINE

## 2018-03-12 PROCEDURE — 97530 THERAPEUTIC ACTIVITIES: CPT

## 2018-03-12 RX ORDER — CALCITRIOL 0.25 UG/1
0.25 CAPSULE, LIQUID FILLED ORAL
Status: DISCONTINUED | OUTPATIENT
Start: 2018-03-12 | End: 2018-03-21 | Stop reason: HOSPADM

## 2018-03-12 RX ORDER — PROPOFOL 10 MG/ML
INJECTION, EMULSION INTRAVENOUS PRN
Status: DISCONTINUED | OUTPATIENT
Start: 2018-03-12 | End: 2018-03-12 | Stop reason: SDUPTHER

## 2018-03-12 RX ORDER — SODIUM CHLORIDE 9 MG/ML
INJECTION, SOLUTION INTRAVENOUS CONTINUOUS PRN
Status: DISCONTINUED | OUTPATIENT
Start: 2018-03-12 | End: 2018-03-12 | Stop reason: SDUPTHER

## 2018-03-12 RX ORDER — ATENOLOL 25 MG/1
25 TABLET ORAL DAILY
Status: DISCONTINUED | OUTPATIENT
Start: 2018-03-13 | End: 2018-03-19

## 2018-03-12 RX ORDER — FENTANYL CITRATE 50 UG/ML
INJECTION, SOLUTION INTRAMUSCULAR; INTRAVENOUS PRN
Status: DISCONTINUED | OUTPATIENT
Start: 2018-03-12 | End: 2018-03-12 | Stop reason: SDUPTHER

## 2018-03-12 RX ADMIN — CALCITRIOL 0.25 MCG: 0.25 CAPSULE, LIQUID FILLED ORAL at 22:56

## 2018-03-12 RX ADMIN — CLONIDINE HYDROCHLORIDE 0.1 MG: 0.1 TABLET ORAL at 10:03

## 2018-03-12 RX ADMIN — SODIUM CHLORIDE: 9 INJECTION, SOLUTION INTRAVENOUS at 20:26

## 2018-03-12 RX ADMIN — DEXTROSE MONOHYDRATE 12.5 G: 25 INJECTION, SOLUTION INTRAVENOUS at 11:21

## 2018-03-12 RX ADMIN — FENTANYL CITRATE 50 MCG: 50 INJECTION, SOLUTION INTRAMUSCULAR; INTRAVENOUS at 13:35

## 2018-03-12 RX ADMIN — FENTANYL CITRATE 50 MCG: 50 INJECTION, SOLUTION INTRAMUSCULAR; INTRAVENOUS at 13:42

## 2018-03-12 RX ADMIN — PROPOFOL 30 MG: 10 INJECTION, EMULSION INTRAVENOUS at 13:47

## 2018-03-12 RX ADMIN — DONEPEZIL HYDROCHLORIDE 5 MG: 5 TABLET, FILM COATED ORAL at 20:20

## 2018-03-12 RX ADMIN — ATENOLOL 50 MG: 50 TABLET ORAL at 10:03

## 2018-03-12 RX ADMIN — SODIUM CHLORIDE: 9 INJECTION, SOLUTION INTRAVENOUS at 08:55

## 2018-03-12 RX ADMIN — MAGNESIUM CITRATE 296 ML: 1.75 LIQUID ORAL at 05:22

## 2018-03-12 RX ADMIN — MIRTAZAPINE 7.5 MG: 15 TABLET, FILM COATED ORAL at 20:19

## 2018-03-12 RX ADMIN — PANTOPRAZOLE SODIUM 40 MG: 40 INJECTION, POWDER, FOR SOLUTION INTRAVENOUS at 10:03

## 2018-03-12 RX ADMIN — Medication: at 10:04

## 2018-03-12 RX ADMIN — INSULIN LISPRO 6 UNITS: 100 INJECTION, SOLUTION INTRAVENOUS; SUBCUTANEOUS at 20:20

## 2018-03-12 RX ADMIN — ATORVASTATIN CALCIUM 80 MG: 40 TABLET, FILM COATED ORAL at 20:19

## 2018-03-12 RX ADMIN — PROPOFOL 60 MG: 10 INJECTION, EMULSION INTRAVENOUS at 13:44

## 2018-03-12 RX ADMIN — Medication 1 SPRAY: at 20:26

## 2018-03-12 RX ADMIN — PANTOPRAZOLE SODIUM 40 MG: 40 INJECTION, POWDER, FOR SOLUTION INTRAVENOUS at 20:20

## 2018-03-12 RX ADMIN — INSULIN GLARGINE 10 UNITS: 100 INJECTION, SOLUTION SUBCUTANEOUS at 20:21

## 2018-03-12 RX ADMIN — Medication 10 ML: at 20:27

## 2018-03-12 RX ADMIN — SODIUM CHLORIDE: 9 INJECTION, SOLUTION INTRAVENOUS at 13:38

## 2018-03-12 ASSESSMENT — PAIN SCALES - GENERAL
PAINLEVEL_OUTOF10: 0

## 2018-03-12 ASSESSMENT — PAIN - FUNCTIONAL ASSESSMENT: PAIN_FUNCTIONAL_ASSESSMENT: 0-10

## 2018-03-12 ASSESSMENT — LIFESTYLE VARIABLES: SMOKING_STATUS: 1

## 2018-03-12 NOTE — PLAN OF CARE
Problem: Falls - Risk of  Goal: Absence of falls  Outcome: Ongoing      Problem: ABCDS Injury Assessment  Goal: Absence of physical injury  Outcome: Ongoing

## 2018-03-12 NOTE — PROGRESS NOTES
Department of Internal Medicine  Nephrology Attending Progress Note        SUBJECTIVE:  The patient back from colonoscopy and surgery on his ft, denies having any pain in his feet or abdomen, poor historian  Physical Exam:    Vitals:    03/12/18 1430   BP: (!) 109/51   Pulse: (!) 48   Resp: 22   Temp: 97.9 °F (36.6 °C)   SpO2: 95%       I/O last 24 hours:  Intake/Output 3403/2175    Weight: 155    General Appearance:  awake, alert, , in no acute distress  Skin:  Skin color, texture, turgor normal. No rashes or lesions. Neck:  neck- supple, no mass, non-tender  Lungs:  Normal expansion. Clear to auscultation. No rales, rhonchi, or wheezing. Heart:  Heart regular rate and rhythm     Abdominal: Abdomen soft, non-tender. BS normal. No masses,  No organomegaly  Extremities: Extremities cool to touch, pink, with no edema.   Peripheral Pulses:  decreased    DATA:    CBC with Differential:    Lab Results   Component Value Date    WBC 4.5 03/11/2018    RBC 2.76 03/11/2018    HGB 8.2 03/11/2018    HCT 26.4 03/11/2018     03/11/2018    MCV 95.7 03/11/2018    MCH 29.7 03/11/2018    MCHC 31.1 03/11/2018    RDW 16.8 03/11/2018    SEGSPCT 49 09/12/2013    LYMPHOPCT 39.9 03/10/2018    MONOPCT 8.2 03/10/2018    BASOPCT 0.2 03/10/2018    MONOSABS 0.40 03/10/2018    LYMPHSABS 1.95 03/10/2018    EOSABS 0.68 03/10/2018    BASOSABS 0.01 03/10/2018     BMP:    Lab Results   Component Value Date     03/12/2018    K 4.9 03/12/2018    K 5.7 03/08/2018     03/12/2018    CO2 28 03/12/2018    BUN 24 03/12/2018    LABALBU 3.5 03/12/2018    LABALBU 4.2 05/17/2012    CREATININE 1.8 03/12/2018    CALCIUM 9.2 03/12/2018    GFRAA 46 03/12/2018    LABGLOM 46 03/12/2018    GLUCOSE 71 03/12/2018    GLUCOSE 242 05/17/2012          vitamin D  50,000 Units Oral Weekly    losartan  50 mg Oral Daily    atenolol  50 mg Oral Daily    atorvastatin  80 mg Oral Nightly    cloNIDine  0.1 mg Oral Daily    donepezil  5 mg Oral Nightly   

## 2018-03-12 NOTE — ANESTHESIA PRE PROCEDURE
spray at 03/10/18 2027    insulin glargine (LANTUS) injection vial 10 Units  10 Units Subcutaneous Nightly Lucy Mart MD   10 Units at 03/11/18 2104    insulin lispro (HUMALOG) injection vial 2-10 Units  2-10 Units Subcutaneous 4x Daily AC & HS Lcuy Mart MD   10 Units at 03/11/18 1151    magnesium oxide (MAG-OX) tablet 400 mg  400 mg Oral Daily Lucy Mart MD   400 mg at 03/11/18 0856    mirtazapine (REMERON) tablet 7.5 mg  7.5 mg Oral Nightly Lucy Mart MD   7.5 mg at 03/11/18 2103    therapeutic multivitamin-minerals 1 tablet  1 tablet Oral Daily Lucy Mart MD   1 tablet at 03/11/18 0856    ondansetron (ZOFRAN) injection 4 mg  4 mg Intravenous Q6H PRN Lucy Mart MD        pantoprazole (PROTONIX) injection 40 mg  40 mg Intravenous BID Lucy Mart MD   40 mg at 03/12/18 1003    glucose (GLUTOSE) 40 % oral gel 15 g  15 g Oral PRN Jefry Hric, DO        dextrose 50 % solution 12.5 g  12.5 g Intravenous PRN Jefry Hric, DO   12.5 g at 03/12/18 1121    glucagon (rDNA) injection 1 mg  1 mg Intramuscular PRN Jefry Hric, DO        dextrose 5 % solution  100 mL/hr Intravenous PRN Jefry Hric, DO        mupirocin (BACTROBAN) 2 % ointment   Topical Daily Sophia Hansen [facility]        collagenase ointment   Topical Daily Sophia Hansen [place]        sodium chloride flush 0.9 % injection 10 mL  10 mL Intravenous Q12H Oswaldo Song CNP   10 mL at 03/11/18 0858    sodium chloride flush 0.9 % injection 10 mL  10 mL Intravenous 2 times per day Juan Francisco Morgan MD   10 mL at 03/11/18 2101    sodium chloride flush 0.9 % injection 10 mL  10 mL Intravenous PRN Juan Francisco Morgan MD   10 mL at 03/10/18 0903    acetaminophen (TYLENOL) tablet 650 mg  650 mg Oral Q4H PRN Juan Francisco Morgan MD           Allergies:  No Known Allergies    Problem List:    Patient Active Problem List   Diagnosis Code    DJD (degenerative joint disease) M19.90 03/12/2018    CALCIUM 9.2 03/12/2018    BILITOT 0.3 03/12/2018    ALKPHOS 125 03/12/2018    AST 13 03/12/2018    ALT 8 03/12/2018       POC Tests: No results for input(s): POCGLU, POCNA, POCK, POCCL, POCBUN, POCHEMO, POCHCT in the last 72 hours. Coags:   Lab Results   Component Value Date    PROTIME 13.0 03/11/2018    PROTIME 11.1 05/17/2012    INR 1.2 03/11/2018    APTT 45.0 03/11/2018       HCG (If Applicable): No results found for: PREGTESTUR, PREGSERUM, HCG, HCGQUANT     ABGs: No results found for: PHART, PO2ART, KWM7NPR, IJM7TWB, BEART, D3CLPEGL     Type & Screen (If Applicable):  No results found for: [facility]    Anesthesia Evaluation  Patient summary reviewed no history of anesthetic complications:   Airway: Mallampati: III  TM distance: >3 FB   Neck ROM: full   Dental:      Comment: Poor dentition    Pulmonary: breath sounds clear to auscultation  (+) current smoker                           Cardiovascular:    (+) hypertension:, hyperlipidemia        Rhythm: regular  Rate: normal           Beta Blocker:  Dose within 24 Hrs         Neuro/Psych:   (+) CVA (expressive aphasia, R sided weakness):, neuromuscular disease (neuropathy):, psychiatric history:depression/anxiety             GI/Hepatic/Renal:   (+) renal disease: ARF, bowel prep,           Endo/Other:    (+) DiabetesType I DM, , blood dyscrasia: anticoagulation therapy:., .                 Abdominal:           Vascular:   + PVD, aortic or cerebral, . Anesthesia Plan      MAC     ASA 3       Induction: intravenous. Anesthetic plan and risks discussed with patient. Plan discussed with CRNA.                   Elvira Ceja MD   3/12/2018

## 2018-03-12 NOTE — PROGRESS NOTES
Treatment consent for Excision of bone left 1st digit with biopsy is in pt's chart.  Electronically signed by Josef Webber RN on 3/11/2018 at 8:41 PM

## 2018-03-12 NOTE — PROGRESS NOTES
Called pt's son Cindy Hager and left a voicemail. Awaiting call back to have treatment consent signed for procedure tomorrow.  Electronically signed by Gareth Ramírez RN on 3/11/2018 at 8:30 PM

## 2018-03-13 ENCOUNTER — APPOINTMENT (OUTPATIENT)
Dept: NUCLEAR MEDICINE | Age: 68
DRG: 244 | End: 2018-03-13
Payer: MEDICAID

## 2018-03-13 LAB
ALBUMIN SERPL-MCNC: 2.7 G/DL (ref 3.5–5.2)
ALP BLD-CCNC: 97 U/L (ref 40–129)
ALT SERPL-CCNC: 9 U/L (ref 0–40)
ANION GAP SERPL CALCULATED.3IONS-SCNC: 9 MMOL/L (ref 7–16)
AST SERPL-CCNC: 12 U/L (ref 0–39)
BASOPHILS ABSOLUTE: 0.01 E9/L (ref 0–0.2)
BASOPHILS RELATIVE PERCENT: 0.2 % (ref 0–2)
BILIRUB SERPL-MCNC: <0.2 MG/DL (ref 0–1.2)
BUN BLDV-MCNC: 26 MG/DL (ref 8–23)
CALCIUM SERPL-MCNC: 8 MG/DL (ref 8.6–10.2)
CHLORIDE BLD-SCNC: 103 MMOL/L (ref 98–107)
CO2: 25 MMOL/L (ref 22–29)
CREAT SERPL-MCNC: 1.7 MG/DL (ref 0.7–1.2)
EOSINOPHILS ABSOLUTE: 0.77 E9/L (ref 0.05–0.5)
EOSINOPHILS RELATIVE PERCENT: 14.8 % (ref 0–6)
GFR AFRICAN AMERICAN: 49
GFR NON-AFRICAN AMERICAN: 49 ML/MIN/1.73
GLUCOSE BLD-MCNC: 336 MG/DL (ref 74–109)
HCT VFR BLD CALC: 23.6 % (ref 37–54)
HCT VFR BLD CALC: 25 % (ref 37–54)
HEMOGLOBIN: 7.4 G/DL (ref 12.5–16.5)
HEMOGLOBIN: 7.6 G/DL (ref 12.5–16.5)
IMMATURE GRANULOCYTES #: 0.03 E9/L
IMMATURE GRANULOCYTES %: 0.6 % (ref 0–5)
LYMPHOCYTES ABSOLUTE: 1.53 E9/L (ref 1.5–4)
LYMPHOCYTES RELATIVE PERCENT: 29.4 % (ref 20–42)
MCH RBC QN AUTO: 30.5 PG (ref 26–35)
MCHC RBC AUTO-ENTMCNC: 31.4 % (ref 32–34.5)
MCV RBC AUTO: 97.1 FL (ref 80–99.9)
METER GLUCOSE: 170 MG/DL (ref 70–110)
METER GLUCOSE: 316 MG/DL (ref 70–110)
METER GLUCOSE: 334 MG/DL (ref 70–110)
METER GLUCOSE: 377 MG/DL (ref 70–110)
MONOCYTES ABSOLUTE: 0.24 E9/L (ref 0.1–0.95)
MONOCYTES RELATIVE PERCENT: 4.6 % (ref 2–12)
NEUTROPHILS ABSOLUTE: 2.62 E9/L (ref 1.8–7.3)
NEUTROPHILS RELATIVE PERCENT: 50.4 % (ref 43–80)
PDW BLD-RTO: 16.9 FL (ref 11.5–15)
PLATELET # BLD: 466 E9/L (ref 130–450)
PMV BLD AUTO: 7.9 FL (ref 7–12)
POTASSIUM SERPL-SCNC: 4.5 MMOL/L (ref 3.5–5)
RBC # BLD: 2.43 E12/L (ref 3.8–5.8)
SODIUM BLD-SCNC: 137 MMOL/L (ref 132–146)
TOTAL PROTEIN: 5.5 G/DL (ref 6.4–8.3)
URIC ACID, SERUM: 5.8 MG/DL (ref 3.4–7)
WBC # BLD: 5.2 E9/L (ref 4.5–11.5)

## 2018-03-13 PROCEDURE — 36415 COLL VENOUS BLD VENIPUNCTURE: CPT

## 2018-03-13 PROCEDURE — 6370000000 HC RX 637 (ALT 250 FOR IP): Performed by: INTERNAL MEDICINE

## 2018-03-13 PROCEDURE — 85018 HEMOGLOBIN: CPT

## 2018-03-13 PROCEDURE — 85014 HEMATOCRIT: CPT

## 2018-03-13 PROCEDURE — 82962 GLUCOSE BLOOD TEST: CPT

## 2018-03-13 PROCEDURE — 6360000002 HC RX W HCPCS: Performed by: INTERNAL MEDICINE

## 2018-03-13 PROCEDURE — 3430000000 HC RX DIAGNOSTIC RADIOPHARMACEUTICAL: Performed by: RADIOLOGY

## 2018-03-13 PROCEDURE — 2580000003 HC RX 258: Performed by: INTERNAL MEDICINE

## 2018-03-13 PROCEDURE — A9560 TC99M LABELED RBC: HCPCS | Performed by: RADIOLOGY

## 2018-03-13 PROCEDURE — 78278 ACUTE GI BLOOD LOSS IMAGING: CPT

## 2018-03-13 PROCEDURE — 84550 ASSAY OF BLOOD/URIC ACID: CPT

## 2018-03-13 PROCEDURE — 85025 COMPLETE CBC W/AUTO DIFF WBC: CPT

## 2018-03-13 PROCEDURE — 80053 COMPREHEN METABOLIC PANEL: CPT

## 2018-03-13 PROCEDURE — C9113 INJ PANTOPRAZOLE SODIUM, VIA: HCPCS | Performed by: INTERNAL MEDICINE

## 2018-03-13 PROCEDURE — 1200000000 HC SEMI PRIVATE

## 2018-03-13 RX ORDER — AMLODIPINE BESYLATE 5 MG/1
5 TABLET ORAL NIGHTLY
Status: DISCONTINUED | OUTPATIENT
Start: 2018-03-13 | End: 2018-03-15

## 2018-03-13 RX ADMIN — PANTOPRAZOLE SODIUM 40 MG: 40 INJECTION, POWDER, FOR SOLUTION INTRAVENOUS at 21:25

## 2018-03-13 RX ADMIN — Medication: at 09:23

## 2018-03-13 RX ADMIN — INSULIN LISPRO 8 UNITS: 100 INJECTION, SOLUTION INTRAVENOUS; SUBCUTANEOUS at 11:28

## 2018-03-13 RX ADMIN — Medication 19 MILLICURIE: at 12:30

## 2018-03-13 RX ADMIN — Medication 10 ML: at 21:25

## 2018-03-13 RX ADMIN — MIRTAZAPINE 7.5 MG: 15 TABLET, FILM COATED ORAL at 21:25

## 2018-03-13 RX ADMIN — MULTIPLE VITAMINS W/ MINERALS TAB 1 TABLET: TAB at 09:22

## 2018-03-13 RX ADMIN — PANTOPRAZOLE SODIUM 40 MG: 40 INJECTION, POWDER, FOR SOLUTION INTRAVENOUS at 09:35

## 2018-03-13 RX ADMIN — CLONIDINE HYDROCHLORIDE 0.1 MG: 0.1 TABLET ORAL at 09:23

## 2018-03-13 RX ADMIN — INSULIN LISPRO 8 UNITS: 100 INJECTION, SOLUTION INTRAVENOUS; SUBCUTANEOUS at 21:26

## 2018-03-13 RX ADMIN — Medication 1 SPRAY: at 11:06

## 2018-03-13 RX ADMIN — SODIUM CHLORIDE: 9 INJECTION, SOLUTION INTRAVENOUS at 09:25

## 2018-03-13 RX ADMIN — INSULIN LISPRO 2 UNITS: 100 INJECTION, SOLUTION INTRAVENOUS; SUBCUTANEOUS at 17:32

## 2018-03-13 RX ADMIN — INSULIN GLARGINE 10 UNITS: 100 INJECTION, SOLUTION SUBCUTANEOUS at 21:25

## 2018-03-13 RX ADMIN — SODIUM CHLORIDE: 9 INJECTION, SOLUTION INTRAVENOUS at 23:46

## 2018-03-13 RX ADMIN — ATORVASTATIN CALCIUM 80 MG: 40 TABLET, FILM COATED ORAL at 21:25

## 2018-03-13 RX ADMIN — Medication 400 MG: at 09:23

## 2018-03-13 RX ADMIN — ATENOLOL 25 MG: 25 TABLET ORAL at 09:23

## 2018-03-13 RX ADMIN — DONEPEZIL HYDROCHLORIDE 5 MG: 5 TABLET, FILM COATED ORAL at 21:24

## 2018-03-13 RX ADMIN — AMLODIPINE BESYLATE 5 MG: 5 TABLET ORAL at 21:25

## 2018-03-13 RX ADMIN — INSULIN LISPRO 10 UNITS: 100 INJECTION, SOLUTION INTRAVENOUS; SUBCUTANEOUS at 06:28

## 2018-03-13 RX ADMIN — Medication 10 ML: at 09:35

## 2018-03-13 ASSESSMENT — PAIN DESCRIPTION - LOCATION: LOCATION: FOOT

## 2018-03-13 ASSESSMENT — PAIN SCALES - GENERAL
PAINLEVEL_OUTOF10: 0

## 2018-03-13 ASSESSMENT — PAIN DESCRIPTION - ORIENTATION: ORIENTATION: LEFT

## 2018-03-13 NOTE — PROGRESS NOTES
visit. Wound 03/07/18 Left left great toe distal-Wound Length (cm): 2 cm  Wound 03/08/18 right great toe medial-Wound Length (cm): 2 cm  Wound 03/08/18 right 2nd medial toe-Wound Length (cm): 3 cm  Wound 03/07/18 Left left great toe distal-Wound Width (cm): 2 cm  Wound 03/08/18 right great toe medial-Wound Width (cm): 1 cm  Wound 03/08/18 right 2nd medial toe-Wound Width (cm): 0.4 cm  Wound 03/07/18 Left left great toe distal-Wound Depth (cm) : obs  Wound 03/08/18 right great toe medial-Wound Depth (cm) : 0.2  Wound 03/08/18 right 2nd medial toe-Wound Depth (cm) : obs (dark red, dry)      Pre Debridement Measurements:  Wound 03/07/18 Left left great toe distal (Active)   Wound Type Wound 3/9/2018 10:21 AM   Dressing Status Clean;Dry; Intact 3/9/2018 10:21 AM   Dressing Changed Changed/New 3/9/2018 10:21 AM   Dressing/Treatment Pharmaceutical agent (see eMar);4x4;Dry dressing 3/9/2018 10:21 AM   Wound Cleansed Rinsed/Irrigated with saline 3/9/2018 10:21 AM   Dressing Change Due 03/10/18 3/9/2018 10:21 AM   Wound Length (cm) 2 cm 3/8/2018  3:28 PM   Wound Width (cm) 2 cm 3/8/2018  3:28 PM   Wound Depth (cm)  obs 3/8/2018  3:28 PM   Calculated Wound Size (cm^2) (l*w) 4 cm^2 3/8/2018  3:28 PM   Change in Wound Size % (l*w) -38.89 3/8/2018  3:28 PM   Wound Assessment White;Yellow 3/9/2018 10:21 AM   Drainage Amount Scant 3/9/2018 10:21 AM   Drainage Description Tan 3/9/2018 10:21 AM   Odor None 3/9/2018 10:21 AM   Louise-wound Assessment Calloused;Dry 3/9/2018 10:21 AM   Culture Taken Yes 3/8/2018 10:45 PM   Number of days: 1       Wound 03/08/18 right great toe medial (Active)   Wound Type Wound 3/8/2018 10:45 PM   Dressing Status Clean;Dry; Intact 3/9/2018 10:21 AM   Dressing Changed Changed/New 3/9/2018 10:21 AM   Dressing/Treatment Pharmaceutical agent (see eMar);4x4;Dry dressing 3/9/2018 10:21 AM   Wound Cleansed Rinsed/Irrigated with saline 3/9/2018 10:21 AM   Dressing Change Due 03/10/18 3/9/2018 10:21 AM   Wound

## 2018-03-13 NOTE — PROGRESS NOTES
[facility] Hospitalist Progress Note    Admitting Date and Time: 3/7/2018  7:34 PM  Admit Dx: GI BLEED  UNKNOWN  -    Subjective: The patient was seen and examined. Stated he was hungry but denies any other complaints.      calcitRIOL  0.25 mcg Oral Q MWF    atenolol  25 mg Oral Daily    vitamin D  50,000 Units Oral Weekly    atorvastatin  80 mg Oral Nightly    cloNIDine  0.1 mg Oral Daily    donepezil  5 mg Oral Nightly    fluticasone  1 spray Nasal BID    insulin glargine  10 Units Subcutaneous Nightly    insulin lispro  2-10 Units Subcutaneous 4x Daily AC & HS    magnesium oxide  400 mg Oral Daily    mirtazapine  7.5 mg Oral Nightly    therapeutic multivitamin-minerals  1 tablet Oral Daily    pantoprazole  40 mg Intravenous BID    mupirocin   Topical Daily    collagenase   Topical Daily    sodium chloride flush  10 mL Intravenous Q12H    sodium chloride flush  10 mL Intravenous 2 times per day       ammonium lactate  PRN   guaiFENesin-dextromethorphan 10 mL Q4H PRN   ondansetron 4 mg Q6H PRN   glucose 15 g PRN   dextrose 12.5 g PRN   glucagon (rDNA) 1 mg PRN   dextrose 100 mL/hr PRN   sodium chloride flush 10 mL PRN   acetaminophen 650 mg Q4H PRN        Objective:    BP (!) 163/89   Pulse 60   Temp 97.9 °F (36.6 °C) (Oral)   Resp 17   Ht 6' (1.829 m)   Wt 155 lb (70.3 kg)   SpO2 95%   BMI 20.45 kg/m²   General Appearance: alert and oriented to person, place and time  Skin: warm and dry, no rash or erythema  Head: normocephalic and atraumatic  Eyes: extraocular eye movements intact  Pulmonary/Chest: clear to auscultation bilaterally- no wheezes, rales or rhonchi, normal air movement, no respiratory distress  Cardiovascular: normal rate and normal S1 and S2  Abdomen: soft, non-tender, non-distended, normal bowel sounds, no masses or organomegaly  Extremities: no cyanosis and no clubbing  Musculoskeletal: normal range of motion, no joint swelling, deformity or tenderness      Recent Labs 03/11/18   0604  03/12/18   0607  03/13/18   0350   NA  138  144  137   K  4.6  4.9  4.5   CL  102  106  103   CO2  25  28  25   BUN  28*  24*  26*   CREATININE  1.8*  1.8*  1.7*   GLUCOSE  96  71*  336*   CALCIUM  8.6  9.2  8.0*       Recent Labs      03/11/18   0604  03/13/18   0350   WBC  4.5  5.2   RBC  2.76*  2.43*   HGB  8.2*  7.4*   HCT  26.4*  23.6*   MCV  95.7  97.1   MCH  29.7  30.5   MCHC  31.1*  31.4*   RDW  16.8*  16.9*   PLT  620*  466*   MPV  8.1  7.9       CBC:   Lab Results   Component Value Date    WBC 5.2 03/13/2018    RBC 2.43 03/13/2018    HGB 7.4 03/13/2018    HCT 23.6 03/13/2018    MCV 97.1 03/13/2018    MCH 30.5 03/13/2018    MCHC 31.4 03/13/2018    RDW 16.9 03/13/2018     03/13/2018    MPV 7.9 03/13/2018     CBC with Differential:    Lab Results   Component Value Date    WBC 5.2 03/13/2018    RBC 2.43 03/13/2018    HGB 7.4 03/13/2018    HCT 23.6 03/13/2018     03/13/2018    MCV 97.1 03/13/2018    MCH 30.5 03/13/2018    MCHC 31.4 03/13/2018    RDW 16.9 03/13/2018    SEGSPCT 49 09/12/2013    LYMPHOPCT 29.4 03/13/2018    MONOPCT 4.6 03/13/2018    BASOPCT 0.2 03/13/2018    MONOSABS 0.24 03/13/2018    LYMPHSABS 1.53 03/13/2018    EOSABS 0.77 03/13/2018    BASOSABS 0.01 03/13/2018     CMP:    Lab Results   Component Value Date     03/13/2018    K 4.5 03/13/2018    K 5.7 03/08/2018     03/13/2018    CO2 25 03/13/2018    BUN 26 03/13/2018    CREATININE 1.7 03/13/2018    GFRAA 49 03/13/2018    LABGLOM 49 03/13/2018    GLUCOSE 336 03/13/2018    GLUCOSE 242 05/17/2012    PROT 5.5 03/13/2018    LABALBU 2.7 03/13/2018    LABALBU 4.2 05/17/2012    CALCIUM 8.0 03/13/2018    BILITOT <0.2 03/13/2018    ALKPHOS 97 03/13/2018    AST 12 03/13/2018    ALT 9 03/13/2018     BMP:    Lab Results   Component Value Date     03/13/2018    K 4.5 03/13/2018    K 5.7 03/08/2018     03/13/2018    CO2 25 03/13/2018    BUN 26 03/13/2018    LABALBU 2.7 03/13/2018    LABALBU 4.2 05/17/2012 CREATININE 1.7 03/13/2018    CALCIUM 8.0 03/13/2018    GFRAA 49 03/13/2018    LABGLOM 49 03/13/2018    GLUCOSE 336 03/13/2018    GLUCOSE 242 05/17/2012        Radiology:   US RETROPERITONEAL COMPLETE   Final Result      1. Findings are suggestive of nonobstructing right renal calculus   measuring approximately 6 mm. 2. No evidence of hydronephrosis. 3. Minimal areas of hypoechogenicity are seen adjacent to both kidneys   which could suggest minimal bilateral perinephric fluid collections or   nonspecific perinephric edema. VL Ankle Art Brachial Indices Extremity Bilateral   Final Result   Abnormal bilateral ankle-brachial indices, consistent with   severe arterial disease in the bilateral lower extremities. XR FOOT LEFT (MIN 3 VIEWS)   Final Result      NM GI BLOOD LOSS    (Results Pending)       Assessment:    Principal Problem:    Acute upper GI bleed  Active Problems:    PVD (peripheral vascular disease) with claudication ([facility])    HTN (hypertension)    Suprapubic catheter ([facility])    Acute blood loss anemia    TERESITA (acute kidney injury) ([address]    Hyperkalemia, mild    DM2 (diabetes mellitus, type 2) ([facility])    Hyperlipidemia    Chronic arterial ischemic stroke, with residual expressive aphasia etc    Possible dementia    Incontinence of urine (now s/p SPC) and stool    Former smoker    Non-healing ulcer ([address] - of left hallux  Resolved Problems:    * No resolved hospital problems. *      Plan:    GI bleed:      Was transfused 2 units      Scope done by GI           ?  TERESITA:      Nephro on board      Not sure if ckd       Stable        Losartan stopped    DM:     Diabetic diet      accu checks    Left first toe non healing ulcer     Podiatry on board     ID on board, suspecting possible osteo      Electronically signed by Renaldo Dobson MD on 3/13/2018 at 2:17 PM

## 2018-03-13 NOTE — PROGRESS NOTES
Department of Internal Medicine  Nephrology Attending Progress Note        SUBJECTIVE:  The patient is being seen for AKIon CKD    3/13/18: Pt back from bleeding scan denies abd pain or nausea and no SOB or CP  Physical Exam:    Vitals:    03/13/18 1600   BP: (!) 177/76   Pulse: 55   Resp: 18   Temp: 97.4 °F (36.3 °C)   SpO2: 95%       General Appearance:  awake, alert, , in no acute distress  Skin:  Skin color, texture, turgor normal. No rashes or lesions. Neck:  neck- supple, no mass, non-tender  Lungs:  Normal expansion. Clear to auscultation. No rales, rhonchi, or wheezing. Heart:  Heart regular rate and rhythm     Abdominal: Abdomen soft, non-tender. BS normal. No masses,  No organomegaly  Extremities: Extremities cool to touch, pink, with no edema.   Peripheral Pulses:  decreased    DATA:    CBC with Differential:    Lab Results   Component Value Date    WBC 5.2 03/13/2018    RBC 2.43 03/13/2018    HGB 7.6 03/13/2018    HCT 25.0 03/13/2018     03/13/2018    MCV 97.1 03/13/2018    MCH 30.5 03/13/2018    MCHC 31.4 03/13/2018    RDW 16.9 03/13/2018    SEGSPCT 49 09/12/2013    LYMPHOPCT 29.4 03/13/2018    MONOPCT 4.6 03/13/2018    BASOPCT 0.2 03/13/2018    MONOSABS 0.24 03/13/2018    LYMPHSABS 1.53 03/13/2018    EOSABS 0.77 03/13/2018    BASOSABS 0.01 03/13/2018     BMP:    Lab Results   Component Value Date     03/13/2018    K 4.5 03/13/2018    K 5.7 03/08/2018     03/13/2018    CO2 25 03/13/2018    BUN 26 03/13/2018    LABALBU 2.7 03/13/2018    LABALBU 4.2 05/17/2012    CREATININE 1.7 03/13/2018    CALCIUM 8.0 03/13/2018    GFRAA 49 03/13/2018    LABGLOM 49 03/13/2018    GLUCOSE 336 03/13/2018    GLUCOSE 242 05/17/2012          calcitRIOL  0.25 mcg Oral Q MWF    atenolol  25 mg Oral Daily    vitamin D  50,000 Units Oral Weekly    atorvastatin  80 mg Oral Nightly    cloNIDine  0.1 mg Oral Daily    donepezil  5 mg Oral Nightly    fluticasone  1 spray Nasal BID    insulin glargine  10 Units Subcutaneous Nightly    insulin lispro  2-10 Units Subcutaneous 4x Daily AC & HS    magnesium oxide  400 mg Oral Daily    mirtazapine  7.5 mg Oral Nightly    therapeutic multivitamin-minerals  1 tablet Oral Daily    pantoprazole  40 mg Intravenous BID    mupirocin   Topical Daily    collagenase   Topical Daily    sodium chloride flush  10 mL Intravenous Q12H    sodium chloride flush  10 mL Intravenous 2 times per day      sodium chloride 75 mL/hr at 03/13/18 0925    dextrose       ammonium lactate, guaiFENesin-dextromethorphan, ondansetron, glucose, dextrose, glucagon (rDNA), dextrose, sodium chloride flush, acetaminophen     3/12/2018 9:00 AM    EXAM: US RETROPERITONEAL COMPLETE    COMPARISON: None    INDICATION:  acute renal failure      FINDINGS:  Right kidney measures 11.4 x 4.5 x 5.4 cm is. Left kidney measures  13.1 x 6.5 x 4.9 cm. Echogenic, shadowing focus is associated with the  right kidney suggestive of a calculus measuring up to 6 mm. There is  no hydronephrosis. Phelps catheter present. Impression:       1. Findings are suggestive of nonobstructing right renal calculus  measuring approximately 6 mm. 2. No evidence of hydronephrosis. 3. Minimal areas of hypoechogenicity are seen adjacent to both kidneys  which could suggest minimal bilateral perinephric fluid collections or  nonspecific perinephric edema.          IMPRESSION/RECOMMENDATIONS:      Stage II TERESITA on ckd G2 with a baseline serum cr from 2017 of 0.7mg/dl -no significant change in cr today-losartan on hold-follow    Anemia in the setting of the GI Bleed- hold on parul iron stores ferritin 53 with an iron sat 20%-will start IV Fe++    Hx of obstructive uropathy, s/p suprapubic catheter-no obstruction noted on US    Secondary hyperparathyroidism -will check Vit D as the last level in 2016 low at 10-hypocalcemia in the setting of hypoalbuminemia-check ionized Ca++ and PO4    HTN with CKd E-[id number] improved with the

## 2018-03-13 NOTE — OP NOTE
[id number] [address]                                 OPERATIVE REPORT    PATIENT NAME: Zarina Chinchilla                       :        1950  MED REC NO:   [id number]                            ROOM:       0518  ACCOUNT NO:   [de-identified]                           ADMIT DATE: 2018  PROVIDER:     Tereso James MD    DATE OF PROCEDURE:  2018    PREOPERATIVE DIAGNOSES:  Evaluation for constipation and weight loss. POSTOPERATIVE DIAGNOSES:  Suboptimal prep, but diffuse diverticulosis,  melanosis coli, otherwise unremarkable colonoscopy. Hemorrhoids were seen  on retroflexion. Followup is suggested in 10 years. PROCEDURE PERFORMED:  Colonoscopy. ANESTHESIA:  LMAC. COLON PREPARATION:  Adequate. DEGREE OF DIFFICULTY:  Mild. WITHDRAWAL TIME:  6-1/2 minutes. NOTE:  Prior to the procedure, an informed consent was obtained from the  patient after explaining the benefits as well as the risks, alternatives,  and complications of the procedure to the patient, who understood and  agreed. PROCEDURE:  With the patient in the left lateral decubitus position, the  digital rectal examination was carried out and was essentially  unremarkable. The Olympus video colonoscope was then introduced into the anal canal,  anorectal area, rectosigmoid, sigmoid and descending colon, advanced around  the splenic flexure into the transverse colon, hepatic flexure, ascending  colon, from which into the cecum. The cecum was identified by the presence of the ileocecal valve,  appendiceal orifice, and light at the right lower quadrant. Evaluation to  the cecum showed scattered diverticulosis. Retroflexion in the rectum  showed hemorrhoids. Otherwise, there was no mucosal or mass lesion to be  seen.     The scope was then retrieved, decompressing the cecum, ascending colon,  transverse, and descending colon,

## 2018-03-14 ENCOUNTER — APPOINTMENT (OUTPATIENT)
Dept: GENERAL RADIOLOGY | Age: 68
DRG: 244 | End: 2018-03-14
Payer: MEDICAID

## 2018-03-14 PROBLEM — E44.0 MODERATE PROTEIN-CALORIE MALNUTRITION (HCC): Chronic | Status: ACTIVE | Noted: 2018-03-14

## 2018-03-14 LAB
ALBUMIN SERPL-MCNC: 2.8 G/DL (ref 3.5–5.2)
ALP BLD-CCNC: 93 U/L (ref 40–129)
ALT SERPL-CCNC: 9 U/L (ref 0–40)
ANION GAP SERPL CALCULATED.3IONS-SCNC: 9 MMOL/L (ref 7–16)
AST SERPL-CCNC: 14 U/L (ref 0–39)
BASOPHILS ABSOLUTE: 0 E9/L (ref 0–0.2)
BASOPHILS RELATIVE PERCENT: 0 % (ref 0–2)
BILIRUB SERPL-MCNC: <0.2 MG/DL (ref 0–1.2)
BUN BLDV-MCNC: 25 MG/DL (ref 8–23)
CALCIUM IONIZED: 1.15 MMOL/L (ref 1.15–1.33)
CALCIUM SERPL-MCNC: 7.9 MG/DL (ref 8.6–10.2)
CHLORIDE BLD-SCNC: 105 MMOL/L (ref 98–107)
CO2: 24 MMOL/L (ref 22–29)
CREAT SERPL-MCNC: 1.7 MG/DL (ref 0.7–1.2)
EOSINOPHILS ABSOLUTE: 0.48 E9/L (ref 0.05–0.5)
EOSINOPHILS RELATIVE PERCENT: 10.5 % (ref 0–6)
FOLATE: 15.7 NG/ML (ref 4.8–24.2)
GFR AFRICAN AMERICAN: 49
GFR NON-AFRICAN AMERICAN: 49 ML/MIN/1.73
GLUCOSE BLD-MCNC: 345 MG/DL (ref 74–109)
HCT VFR BLD CALC: 24.3 % (ref 37–54)
HCT VFR BLD CALC: 26.7 % (ref 37–54)
HEMOGLOBIN: 7.5 G/DL (ref 12.5–16.5)
HEMOGLOBIN: 8.3 G/DL (ref 12.5–16.5)
IMMATURE GRANULOCYTES #: 0.03 E9/L
IMMATURE GRANULOCYTES %: 0.7 % (ref 0–5)
LYMPHOCYTES ABSOLUTE: 1.31 E9/L (ref 1.5–4)
LYMPHOCYTES RELATIVE PERCENT: 28.5 % (ref 20–42)
MAGNESIUM: 2.2 MG/DL (ref 1.6–2.6)
MCH RBC QN AUTO: 30.6 PG (ref 26–35)
MCHC RBC AUTO-ENTMCNC: 31.1 % (ref 32–34.5)
MCV RBC AUTO: 98.5 FL (ref 80–99.9)
METER GLUCOSE: 149 MG/DL (ref 70–110)
METER GLUCOSE: 163 MG/DL (ref 70–110)
METER GLUCOSE: 234 MG/DL (ref 70–110)
METER GLUCOSE: 374 MG/DL (ref 70–110)
MONOCYTES ABSOLUTE: 0.14 E9/L (ref 0.1–0.95)
MONOCYTES RELATIVE PERCENT: 3.1 % (ref 2–12)
NEUTROPHILS ABSOLUTE: 2.63 E9/L (ref 1.8–7.3)
NEUTROPHILS RELATIVE PERCENT: 57.2 % (ref 43–80)
PDW BLD-RTO: 17.1 FL (ref 11.5–15)
PHOSPHORUS: 2.8 MG/DL (ref 2.5–4.5)
PLATELET # BLD: 455 E9/L (ref 130–450)
PMV BLD AUTO: 8.2 FL (ref 7–12)
POTASSIUM SERPL-SCNC: 4.5 MMOL/L (ref 3.5–5)
RBC # BLD: 2.71 E12/L (ref 3.8–5.8)
SODIUM BLD-SCNC: 138 MMOL/L (ref 132–146)
TOTAL PROTEIN: 5.8 G/DL (ref 6.4–8.3)
VITAMIN B-12: 985 PG/ML (ref 211–946)
VITAMIN D 25-HYDROXY: 39 NG/ML (ref 30–100)
WBC # BLD: 4.6 E9/L (ref 4.5–11.5)

## 2018-03-14 PROCEDURE — 85025 COMPLETE CBC W/AUTO DIFF WBC: CPT

## 2018-03-14 PROCEDURE — 1200000000 HC SEMI PRIVATE

## 2018-03-14 PROCEDURE — 36415 COLL VENOUS BLD VENIPUNCTURE: CPT

## 2018-03-14 PROCEDURE — 2580000003 HC RX 258: Performed by: INTERNAL MEDICINE

## 2018-03-14 PROCEDURE — 82330 ASSAY OF CALCIUM: CPT

## 2018-03-14 PROCEDURE — 82306 VITAMIN D 25 HYDROXY: CPT

## 2018-03-14 PROCEDURE — 97530 THERAPEUTIC ACTIVITIES: CPT

## 2018-03-14 PROCEDURE — 85018 HEMOGLOBIN: CPT

## 2018-03-14 PROCEDURE — 0DJD8ZZ INSPECTION OF LOWER INTESTINAL TRACT, VIA NATURAL OR ARTIFICIAL OPENING ENDOSCOPIC: ICD-10-PCS | Performed by: INTERNAL MEDICINE

## 2018-03-14 PROCEDURE — 85014 HEMATOCRIT: CPT

## 2018-03-14 PROCEDURE — C9113 INJ PANTOPRAZOLE SODIUM, VIA: HCPCS | Performed by: INTERNAL MEDICINE

## 2018-03-14 PROCEDURE — 2580000003 HC RX 258: Performed by: NURSE PRACTITIONER

## 2018-03-14 PROCEDURE — 6370000000 HC RX 637 (ALT 250 FOR IP): Performed by: INTERNAL MEDICINE

## 2018-03-14 PROCEDURE — 6360000002 HC RX W HCPCS: Performed by: INTERNAL MEDICINE

## 2018-03-14 PROCEDURE — 83735 ASSAY OF MAGNESIUM: CPT

## 2018-03-14 PROCEDURE — 82746 ASSAY OF FOLIC ACID SERUM: CPT

## 2018-03-14 PROCEDURE — 82962 GLUCOSE BLOOD TEST: CPT

## 2018-03-14 PROCEDURE — 80053 COMPREHEN METABOLIC PANEL: CPT

## 2018-03-14 PROCEDURE — 82607 VITAMIN B-12: CPT

## 2018-03-14 PROCEDURE — 71046 X-RAY EXAM CHEST 2 VIEWS: CPT

## 2018-03-14 PROCEDURE — 84100 ASSAY OF PHOSPHORUS: CPT

## 2018-03-14 RX ADMIN — Medication 1 SPRAY: at 09:51

## 2018-03-14 RX ADMIN — INSULIN LISPRO 2 UNITS: 100 INJECTION, SOLUTION INTRAVENOUS; SUBCUTANEOUS at 21:54

## 2018-03-14 RX ADMIN — MIRTAZAPINE 7.5 MG: 15 TABLET, FILM COATED ORAL at 21:51

## 2018-03-14 RX ADMIN — ATORVASTATIN CALCIUM 80 MG: 40 TABLET, FILM COATED ORAL at 21:51

## 2018-03-14 RX ADMIN — MULTIPLE VITAMINS W/ MINERALS TAB 1 TABLET: TAB at 09:47

## 2018-03-14 RX ADMIN — PANTOPRAZOLE SODIUM 40 MG: 40 INJECTION, POWDER, FOR SOLUTION INTRAVENOUS at 21:51

## 2018-03-14 RX ADMIN — Medication: at 11:35

## 2018-03-14 RX ADMIN — ATENOLOL 25 MG: 25 TABLET ORAL at 09:47

## 2018-03-14 RX ADMIN — Medication 400 MG: at 09:47

## 2018-03-14 RX ADMIN — CALCITRIOL 0.25 MCG: 0.25 CAPSULE, LIQUID FILLED ORAL at 21:51

## 2018-03-14 RX ADMIN — SODIUM CHLORIDE 100 MG: 9 INJECTION, SOLUTION INTRAVENOUS at 12:52

## 2018-03-14 RX ADMIN — DONEPEZIL HYDROCHLORIDE 5 MG: 5 TABLET, FILM COATED ORAL at 21:51

## 2018-03-14 RX ADMIN — SODIUM CHLORIDE 25 MG: 9 INJECTION, SOLUTION INTRAVENOUS at 09:48

## 2018-03-14 RX ADMIN — INSULIN GLARGINE 10 UNITS: 100 INJECTION, SOLUTION SUBCUTANEOUS at 21:53

## 2018-03-14 RX ADMIN — CLONIDINE HYDROCHLORIDE 0.1 MG: 0.1 TABLET ORAL at 09:47

## 2018-03-14 RX ADMIN — Medication 10 ML: at 21:51

## 2018-03-14 RX ADMIN — INSULIN LISPRO 10 UNITS: 100 INJECTION, SOLUTION INTRAVENOUS; SUBCUTANEOUS at 16:48

## 2018-03-14 RX ADMIN — Medication 10 ML: at 21:53

## 2018-03-14 RX ADMIN — INSULIN LISPRO 4 UNITS: 100 INJECTION, SOLUTION INTRAVENOUS; SUBCUTANEOUS at 12:15

## 2018-03-14 RX ADMIN — Medication 10 ML: at 09:49

## 2018-03-14 RX ADMIN — SODIUM CHLORIDE: 9 INJECTION, SOLUTION INTRAVENOUS at 12:08

## 2018-03-14 RX ADMIN — PANTOPRAZOLE SODIUM 40 MG: 40 INJECTION, POWDER, FOR SOLUTION INTRAVENOUS at 09:47

## 2018-03-14 RX ADMIN — AMLODIPINE BESYLATE 5 MG: 5 TABLET ORAL at 21:51

## 2018-03-14 ASSESSMENT — PAIN SCALES - GENERAL
PAINLEVEL_OUTOF10: 0
PAINLEVEL_OUTOF10: 0

## 2018-03-14 NOTE — PROGRESS NOTES
Follow-Up  Podiatry Wound Progress Note  Delores Peters AGE: 80 y.o. GENDER: male  : 1950  TODAY'S DATE:  3/14/2018  Subjective:    Delores Peters is a 80 y.o. male who presents today for wound check.   Wound Etiology :diabetic foot ulcer, PVD  Wound Location :  left and right  Pain Level: 0   Pain Assessment: 0-10    Abx : Present     Patient Active Problem List   Diagnosis Code    DJD (degenerative joint disease) M19.90    Type II diabetes mellitus, uncontrolled ([facility] E11.65    Hyperlipoproteinemia E78.5    Neuropathy ([facility] G62.9    Depression F32.9    Tobacco dependence F17.200    Pain in lower limb M79.606    PVD (peripheral vascular disease) with claudication ([facility]) I73.9    Onychomycosis B35.1    Atherosclerosis of native arteries of extremity with rest pain ([facility]) I70.229    Right sided weakness R53.1    Atherosclerosis of native artery of right lower extremity with rest pain ([facility]) I70.221    Pulmonary nodule R91.1    DKA (diabetic ketoacidoses) ([facility]) E13.10    Diabetic ulcer of right foot associated with type 2 diabetes mellitus ([facility]) E11.621, L97.519    UTI (urinary tract infection) N39.0    DKA (diabetic ketoacidoses) ([facility]) E13.10    Disorientation R41.0    Hyponatremia E87.1    Sepsis ([facility]) A41.9    HTN (hypertension) I10    Suprapubic catheter ([facility]) Z93.59    Acute upper GI bleed K92.2    Acute blood loss anemia D62    TERESITA (acute kidney injury) ([facility] N17.9    Hyperkalemia, mild E87.5    DM2 (diabetes mellitus, type 2) ([facility]) E11.9    Hyperlipidemia E78.5    Chronic arterial ischemic stroke, with residual expressive aphasia etc I69.30    Possible dementia F03.90    Incontinence of urine (now s/p SPC) and stool R32    Former smoker Z87.891    Non-healing ulcer ([facility] - of left hallux L98.499        Objective:    /76   Pulse 55   Temp 97.6 °F (36.4 °C) (Oral)   Resp 16   Ht 6' (1.829 m)   Wt 152 lb 11.2 oz (69.3 kg)   SpO2 97%   BMI 20.71 kg/m² visit. Wound 03/07/18 Left left great toe distal-Wound Length (cm): 2 cm  Wound 03/08/18 right great toe medial-Wound Length (cm): 2 cm  Wound 03/08/18 right 2nd medial toe-Wound Length (cm): 3 cm  Wound 03/07/18 Left left great toe distal-Wound Width (cm): 2 cm  Wound 03/08/18 right great toe medial-Wound Width (cm): 1 cm  Wound 03/08/18 right 2nd medial toe-Wound Width (cm): 0.4 cm  Wound 03/07/18 Left left great toe distal-Wound Depth (cm) : obs  Wound 03/08/18 right great toe medial-Wound Depth (cm) : 0.2  Wound 03/08/18 right 2nd medial toe-Wound Depth (cm) : obs (dark red, dry)      Pre Debridement Measurements:  Wound 03/07/18 Left left great toe distal (Active)   Wound Type Wound 3/9/2018 10:21 AM   Dressing Status Clean;Dry; Intact 3/9/2018 10:21 AM   Dressing Changed Changed/New 3/9/2018 10:21 AM   Dressing/Treatment Pharmaceutical agent (see eMar);4x4;Dry dressing 3/9/2018 10:21 AM   Wound Cleansed Rinsed/Irrigated with saline 3/9/2018 10:21 AM   Dressing Change Due 03/10/18 3/9/2018 10:21 AM   Wound Length (cm) 2 cm 3/8/2018  3:28 PM   Wound Width (cm) 2 cm 3/8/2018  3:28 PM   Wound Depth (cm)  obs 3/8/2018  3:28 PM   Calculated Wound Size (cm^2) (l*w) 4 cm^2 3/8/2018  3:28 PM   Change in Wound Size % (l*w) -38.89 3/8/2018  3:28 PM   Wound Assessment White;Yellow 3/9/2018 10:21 AM   Drainage Amount Scant 3/9/2018 10:21 AM   Drainage Description Tan 3/9/2018 10:21 AM   Odor None 3/9/2018 10:21 AM   Louise-wound Assessment Calloused;Dry 3/9/2018 10:21 AM   Culture Taken Yes 3/8/2018 10:45 PM   Number of days: 1       Wound 03/08/18 right great toe medial (Active)   Wound Type Wound 3/8/2018 10:45 PM   Dressing Status Clean;Dry; Intact 3/9/2018 10:21 AM   Dressing Changed Changed/New 3/9/2018 10:21 AM   Dressing/Treatment Pharmaceutical agent (see eMar);4x4;Dry dressing 3/9/2018 10:21 AM   Wound Cleansed Rinsed/Irrigated with saline 3/9/2018 10:21 AM   Dressing Change Due 03/10/18 3/9/2018 10:21 AM   Wound

## 2018-03-14 NOTE — PLAN OF CARE
Problem: Nutrition  Goal: Optimal nutrition therapy  Outcome: Ongoing  Nutrition Problem: Moderate malnutrition, in context of acute illness or injury  Intervention: Food and/or Nutrient Delivery: Continue current diet, Start ONS (Ensure HP BID)  Nutritional Goals: Consume >50% meals/ONS.  promote wound healing

## 2018-03-14 NOTE — PROGRESS NOTES
24.9 Normal Weight  · Comparative Standards (Estimated Nutrition Needs):  · Estimated Daily Total Kcal: 3821-0536  · Estimated Daily Protein (g):     Estimated Intake vs Estimated Needs: Intake Less Than Needs    Nutrition Risk Level: Moderate    Nutrition Interventions:   Continue current diet, Start ONS (Ensure HP BID)  Continued Inpatient Monitoring, Coordination of Care, Education Initiated (Discussed elevated blood glucose levels/increased protein needs)    Nutrition Evaluation:   · Evaluation: Progressing toward goals   · Goals: Consume >50% meals/ONS. promote wound healing    · Monitoring: Meal Intake, Supplement Intake, Diet Tolerance, Skin Integrity, Wound Healing, Fluid Balance, Weight, Comparative Standards, Pertinent Labs    See Adult Nutrition Doc Flowsheet for more detail.      Electronically signed by Adams Vallejo RD, LD on 3/14/18 at 3:07 PM    Contact Number: Ext [phone]

## 2018-03-14 NOTE — PROGRESS NOTES
[facility] Hospitalist Progress Note    Admitting Date and Time: 3/7/2018  7:34 PM  Admit Dx: GI BLEED  UNKNOWN  -    Subjective: The patient was seen and examined. Stated he was hungry but denies any other complaints.      ferric gluconate (FERRLECIT) IVPB  100 mg Intravenous Once    Followed by   Dinora Grider ON 3/15/2018] ferric gluconate (FERRLECIT) IVPB  125 mg Intravenous Daily    amLODIPine  5 mg Oral Nightly    calcitRIOL  0.25 mcg Oral Q MWF    atenolol  25 mg Oral Daily    vitamin D  50,000 Units Oral Weekly    atorvastatin  80 mg Oral Nightly    cloNIDine  0.1 mg Oral Daily    donepezil  5 mg Oral Nightly    fluticasone  1 spray Nasal BID    insulin glargine  10 Units Subcutaneous Nightly    insulin lispro  2-10 Units Subcutaneous 4x Daily AC & HS    magnesium oxide  400 mg Oral Daily    mirtazapine  7.5 mg Oral Nightly    therapeutic multivitamin-minerals  1 tablet Oral Daily    pantoprazole  40 mg Intravenous BID    mupirocin   Topical Daily    collagenase   Topical Daily    sodium chloride flush  10 mL Intravenous Q12H    sodium chloride flush  10 mL Intravenous 2 times per day       ammonium lactate  PRN   guaiFENesin-dextromethorphan 10 mL Q4H PRN   ondansetron 4 mg Q6H PRN   glucose 15 g PRN   dextrose 12.5 g PRN   glucagon (rDNA) 1 mg PRN   dextrose 100 mL/hr PRN   sodium chloride flush 10 mL PRN   acetaminophen 650 mg Q4H PRN        Objective:    /76   Pulse 55   Temp 97.6 °F (36.4 °C) (Oral)   Resp 16   Ht 6' (1.829 m)   Wt 152 lb 11.2 oz (69.3 kg)   SpO2 97%   BMI 20.71 kg/m²   General Appearance: alert and oriented to person, place and time  Skin: warm and dry, no rash or erythema  Head: normocephalic and atraumatic  Eyes: extraocular eye movements intact  Pulmonary/Chest: clear to auscultation bilaterally- no wheezes, rales or rhonchi, normal air movement, no respiratory distress  Cardiovascular: normal rate and normal S1 and S2  Abdomen: soft, non-tender, non-distended, normal bowel sounds, no masses or organomegaly  Extremities: no cyanosis and no clubbing  Musculoskeletal: normal range of motion, no joint swelling, deformity or tenderness      Recent Labs      03/12/18   0607  03/13/18   0350   NA  144  137   K  4.9  4.5   CL  106  103   CO2  28  25   BUN  24*  26*   CREATININE  1.8*  1.7*   GLUCOSE  71*  336*   CALCIUM  9.2  8.0*       Recent Labs      03/13/18   0350  03/13/18   1600  03/14/18   0045   WBC  5.2   --    --    RBC  2.43*   --    --    HGB  7.4*  7.6*  7.5*   HCT  23.6*  25.0*  24.3*   MCV  97.1   --    --    MCH  30.5   --    --    MCHC  31.4*   --    --    RDW  16.9*   --    --    PLT  466*   --    --    MPV  7.9   --    --        CBC:   Lab Results   Component Value Date    WBC 5.2 03/13/2018    RBC 2.43 03/13/2018    HGB 7.5 03/14/2018    HCT 24.3 03/14/2018    MCV 97.1 03/13/2018    MCH 30.5 03/13/2018    MCHC 31.4 03/13/2018    RDW 16.9 03/13/2018     03/13/2018    MPV 7.9 03/13/2018     CBC with Differential:    Lab Results   Component Value Date    WBC 5.2 03/13/2018    RBC 2.43 03/13/2018    HGB 7.5 03/14/2018    HCT 24.3 03/14/2018     03/13/2018    MCV 97.1 03/13/2018    MCH 30.5 03/13/2018    MCHC 31.4 03/13/2018    RDW 16.9 03/13/2018    SEGSPCT 49 09/12/2013    LYMPHOPCT 29.4 03/13/2018    MONOPCT 4.6 03/13/2018    BASOPCT 0.2 03/13/2018    MONOSABS 0.24 03/13/2018    LYMPHSABS 1.53 03/13/2018    EOSABS 0.77 03/13/2018    BASOSABS 0.01 03/13/2018     CMP:    Lab Results   Component Value Date     03/13/2018    K 4.5 03/13/2018    K 5.7 03/08/2018     03/13/2018    CO2 25 03/13/2018    BUN 26 03/13/2018    CREATININE 1.7 03/13/2018    GFRAA 49 03/13/2018    LABGLOM 49 03/13/2018    GLUCOSE 336 03/13/2018    GLUCOSE 242 05/17/2012    PROT 5.5 03/13/2018    LABALBU 2.7 03/13/2018    LABALBU 4.2 05/17/2012    CALCIUM 8.0 03/13/2018    BILITOT <0.2 03/13/2018    ALKPHOS 97 03/13/2018    AST 12 03/13/2018    ALT 9

## 2018-03-14 NOTE — PROGRESS NOTES
Department of Internal Medicine  Nephrology Attending Progress Note        SUBJECTIVE:  The patient is being seen for AKIon CKD    3/14/18: Pt resting in bed  denies abd pain or nausea and no SOB or CP  Physical Exam:    Vitals:    03/14/18 1015   BP: 138/76   Pulse: 55   Resp: 16   Temp: 97.6 °F (36.4 °C)   SpO2: 97%       General Appearance:  awake, alert, , in no acute distress  Skin:  Skin color, texture, turgor normal. No rashes or lesions. Neck:  neck- supple, no mass, non-tender  Lungs:  Normal expansion. Clear to auscultation. No rales, rhonchi, or wheezing. Heart:  Heart regular rate and rhythm     Abdominal: Abdomen soft, non-tender. BS normal. No masses,  No organomegaly  Extremities: Extremities cool to touch, pink, with no edema.   Peripheral Pulses:  decreased    DATA:    CBC with Differential:    Lab Results   Component Value Date    WBC 4.6 03/14/2018    RBC 2.71 03/14/2018    HGB 8.3 03/14/2018    HCT 26.7 03/14/2018     03/14/2018    MCV 98.5 03/14/2018    MCH 30.6 03/14/2018    MCHC 31.1 03/14/2018    RDW 17.1 03/14/2018    SEGSPCT 49 09/12/2013    LYMPHOPCT 28.5 03/14/2018    MONOPCT 3.1 03/14/2018    BASOPCT 0.0 03/14/2018    MONOSABS 0.14 03/14/2018    LYMPHSABS 1.31 03/14/2018    EOSABS 0.48 03/14/2018    BASOSABS 0.00 03/14/2018     BMP:    Lab Results   Component Value Date     03/14/2018    K 4.5 03/14/2018    K 5.7 03/08/2018     03/14/2018    CO2 24 03/14/2018    BUN 25 03/14/2018    LABALBU 2.8 03/14/2018    LABALBU 4.2 05/17/2012    CREATININE 1.7 03/14/2018    CALCIUM 7.9 03/14/2018    GFRAA 49 03/14/2018    LABGLOM 49 03/14/2018    GLUCOSE 345 03/14/2018    GLUCOSE 242 05/17/2012          [START ON 3/15/2018] ferric gluconate (FERRLECIT) IVPB  125 mg Intravenous Daily    amLODIPine  5 mg Oral Nightly    calcitRIOL  0.25 mcg Oral Q MWF    atenolol  25 mg Oral Daily    vitamin D  50,000 Units Oral Weekly    atorvastatin  80 mg Oral Nightly    cloNIDine  0.1 2016 low at 10-hypocalcemia in the setting of hypoalbuminemia- ionized Ca++ WNL and PO4 WNL    HTN with CKd [id number] stable  with the decreased  Atenolol-BP above goal <130/80 added HS amlodipine 3/13 no new change today in meds-as taking po stop the IVF-follow response over the next Bettye Mcdowell MD  3/14/2018 5:23 PM

## 2018-03-14 NOTE — PROGRESS NOTES
(REMERON) tablet 7.5 mg Nightly   therapeutic multivitamin-minerals 1 tablet Daily   ondansetron (ZOFRAN) injection 4 mg Q6H PRN   pantoprazole (PROTONIX) injection 40 mg BID   glucose (GLUTOSE) 40 % oral gel 15 g PRN   dextrose 50 % solution 12.5 g PRN   glucagon (rDNA) injection 1 mg PRN   dextrose 5 % solution PRN   mupirocin (BACTROBAN) 2 % ointment Daily   collagenase ointment Daily   sodium chloride flush 0.9 % injection 10 mL Q12H   sodium chloride flush 0.9 % injection 10 mL 2 times per day   sodium chloride flush 0.9 % injection 10 mL PRN   acetaminophen (TYLENOL) tablet 650 mg Q4H PRN        Data Review  Recent Labs      03/12/18   0607  03/13/18   0350  03/14/18   1320   NA  144  137  138   K  4.9  4.5  4.5   CL  106  103  105   CO2  28  25  24   BUN  24*  26*  25*   CREATININE  1.8*  1.7*  1.7*   GLUCOSE  71*  336*  345*   CALCIUM  9.2  8.0*  7.9*   ALKPHOS  125  97  93   ALT  8  9  9   AST  13  12  14   PROT  7.1  5.5*  5.8*   LABALBU  3.5  2.7*  2.8*       Recent Labs      03/13/18   0350  03/13/18   1600  03/14/18   0045  03/14/18   1320   WBC  5.2   --    --   4.6   RBC  2.43*   --    --   2.71*   HGB  7.4*  7.6*  7.5*  8.3*   HCT  23.6*  25.0*  24.3*  26.7*   MCV  97.1   --    --   98.5   MCH  30.5   --    --   30.6   MCHC  31.4*   --    --   31.1*   RDW  16.9*   --    --   17.1*   PLT  466*   --    --   455*   MPV  7.9   --    --   8.2       No components found for: CHLPL    Lab Results   Component Value Date    TRIG 113 05/28/2016    TRIG 62 09/21/2015    TRIG 183 (H) 01/10/2015       Lab Results   Component Value Date    HDL 49 05/28/2016    HDL 43 09/21/2015    HDL 40 01/10/2015       Lab Results   Component Value Date    LDLCALC 32 05/28/2016    LDLCALC 148 (H) 09/21/2015    LDLCALC 150 (H) 01/10/2015       Lab Results   Component Value Date    LABVLDL 23 05/28/2016    LABVLDL 12 09/21/2015    LABVLDL 37 01/10/2015      PT/INR:    Lab Results   Component Value Date    PROTIME 13.0 03/11/2018

## 2018-03-14 NOTE — PROGRESS NOTES
Physical Therapy  Facility/Department: [address] MED SURG  Daily Treatment Note  NAME: Lesvia Ernandez.   : 1950  MRN: [id number]    Date of Service: 3/14/2018    Patient Diagnosis(es):   Patient Active Problem List    Diagnosis Date Noted    Acute upper GI bleed 2018     Priority: High    Acute blood loss anemia 2018     Priority: High    TERESITA (acute kidney injury) ([address] 2018     Priority: High    Hyperkalemia, mild 2018     Priority: High    Disorientation 2016     Priority: High    Hyponatremia 2016     Priority: High    DM2 (diabetes mellitus, type 2) ([address] 2018     Priority: Medium    Hyperlipidemia 2018     Priority: Medium    PVD (peripheral vascular disease) with claudication ([address] 2015     Priority: Medium    Type II diabetes mellitus, uncontrolled ([address] 10/29/2011     Priority: Medium    Chronic arterial ischemic stroke, with residual expressive aphasia etc 2018     Priority: Low    Possible dementia 2018     Priority: Low    Incontinence of urine (now s/p SPC) and stool 2018     Priority: Low    Former smoker 2018     Priority: Low    Non-healing ulcer ([address] - of left hallux 2018     Priority: Low    Sepsis ([address] 2017    HTN (hypertension) 2017    Suprapubic catheter ([address] 2017    DKA (diabetic ketoacidoses) ([address] 2016    UTI (urinary tract infection) 2016    Diabetic ulcer of right foot associated with type 2 diabetes mellitus ([address] 2016    DKA (diabetic ketoacidoses) ([address] 2016    Pulmonary nodule 2016    Atherosclerosis of native artery of right lower extremity with rest pain ([address]     Tobacco dependence 2015    Pain in lower limb 2015    Onychomycosis 2015    Atherosclerosis of native arteries of extremity with rest pain ([address] 2015    Right sided weakness 2015    Depression 01/10/2015    Neuropathy ([facility] 09/12/2013    DJD (degenerative joint disease) 10/29/2011    Hyperlipoproteinemia 10/29/2011       Past Medical History:   Diagnosis Date    Atherosclerosis of native arteries of extremity with rest pain ([facility] 9/23/2015    Back pain, chronic     Cerebral artery occlusion with cerebral infarction ([facility]     Depression     Diabetes mellitus ([address]     Diabetic ketoacidosis without coma associated with type 2 diabetes mellitus ([address] 4/6/2016    Diabetic ulcer of right foot associated with type 2 diabetes mellitus ([address] 4/8/2016    Hypertension     Onychomycosis 9/23/2015    Pain in lower limb 9/23/2015    Poor memory     and ?dementia, early stage    Pulmonary nodule 4/5/2016    PVD (peripheral vascular disease) with claudication ([facility] 9/23/2015    Right sided weakness 9/23/2015    Stroke ([facility] 04/2016    with expressive aphasia, memory problems, b/l LE weakness, incontinence of stool and urine    Tobacco dependence 9/23/2015     Past Surgical History:   Procedure Laterality Date    BACK SURGERY      CYSTOSCOPY  4/8/16    Removal of Bladder Stone    ECHO COMPL W DOP COLOR FLOW  5/18/2012         OTHER SURGICAL HISTORY  11/28/15    lap choley and cholangiogram    OTHER SURGICAL HISTORY  4/8/16    Suprapubic Catheter Insertion    ND COLONOSCOPY FLX DX W/COLLJ SPEC WHEN PFRMD N/A 3/12/2018    COLONOSCOPY performed by Duarte Guardado MD at [facility]  03/09/2018     Evaluating Therapist: Kaci Montenegro. Shelbie Younger, P.T.     Room #: 2839/2573-G  DIAGNOSIS: GI bleed  PRECAUTIONS: suprapubic catheter, falls, bed alarm     Social:  Pt lives at [place] in a 1 floor plan no steps and no rails to enter. Prior to admission pt walked with ww       Initial Evaluation  Date: 3/8/18 Treatment  3/13   Short Term/ Long Term   Goals   Was pt agreeable to Eval/treatment? yes yes     Does pt have pain?  No c/o pain B foot pain with weight bearing     Bed Mobility  Rolling: SBA  Supine to

## 2018-03-15 ENCOUNTER — ANESTHESIA EVENT (OUTPATIENT)
Dept: OPERATING ROOM | Age: 68
DRG: 244 | End: 2018-03-15
Payer: MEDICAID

## 2018-03-15 ENCOUNTER — ANESTHESIA (OUTPATIENT)
Dept: OPERATING ROOM | Age: 68
DRG: 244 | End: 2018-03-15
Payer: MEDICAID

## 2018-03-15 LAB
ABO/RH: NORMAL
ALBUMIN SERPL-MCNC: 2.4 G/DL (ref 3.5–5.2)
ALP BLD-CCNC: 78 U/L (ref 40–129)
ALT SERPL-CCNC: 9 U/L (ref 0–40)
ANION GAP SERPL CALCULATED.3IONS-SCNC: 9 MMOL/L (ref 7–16)
ANTIBODY SCREEN: NORMAL
AST SERPL-CCNC: 13 U/L (ref 0–39)
BASOPHILS ABSOLUTE: 0 E9/L (ref 0–0.2)
BASOPHILS RELATIVE PERCENT: 0 % (ref 0–2)
BILIRUB SERPL-MCNC: <0.2 MG/DL (ref 0–1.2)
BUN BLDV-MCNC: 30 MG/DL (ref 8–23)
CALCIUM IONIZED: 1.19 MMOL/L (ref 1.15–1.33)
CALCIUM SERPL-MCNC: 7.9 MG/DL (ref 8.6–10.2)
CHLORIDE BLD-SCNC: 104 MMOL/L (ref 98–107)
CO2: 24 MMOL/L (ref 22–29)
CREAT SERPL-MCNC: 1.7 MG/DL (ref 0.7–1.2)
EOSINOPHILS ABSOLUTE: 0.41 E9/L (ref 0.05–0.5)
EOSINOPHILS RELATIVE PERCENT: 9.5 % (ref 0–6)
GFR AFRICAN AMERICAN: 49
GFR NON-AFRICAN AMERICAN: 49 ML/MIN/1.73
GLUCOSE BLD-MCNC: 144 MG/DL (ref 74–109)
HCT VFR BLD CALC: 21.7 % (ref 37–54)
HCT VFR BLD CALC: 23.2 % (ref 37–54)
HEMOGLOBIN: 6.7 G/DL (ref 12.5–16.5)
HEMOGLOBIN: 7.2 G/DL (ref 12.5–16.5)
IMMATURE GRANULOCYTES #: 0.02 E9/L
IMMATURE GRANULOCYTES %: 0.5 % (ref 0–5)
INR BLD: 1
LYMPHOCYTES ABSOLUTE: 1.5 E9/L (ref 1.5–4)
LYMPHOCYTES RELATIVE PERCENT: 34.8 % (ref 20–42)
MCH RBC QN AUTO: 30 PG (ref 26–35)
MCHC RBC AUTO-ENTMCNC: 31 % (ref 32–34.5)
MCV RBC AUTO: 96.7 FL (ref 80–99.9)
METER GLUCOSE: 133 MG/DL (ref 70–110)
METER GLUCOSE: 220 MG/DL (ref 70–110)
METER GLUCOSE: 257 MG/DL (ref 70–110)
METER GLUCOSE: 265 MG/DL (ref 70–110)
MONOCYTES ABSOLUTE: 0.18 E9/L (ref 0.1–0.95)
MONOCYTES RELATIVE PERCENT: 4.2 % (ref 2–12)
NEUTROPHILS ABSOLUTE: 2.2 E9/L (ref 1.8–7.3)
NEUTROPHILS RELATIVE PERCENT: 51 % (ref 43–80)
PDW BLD-RTO: 16.7 FL (ref 11.5–15)
PLATELET # BLD: 324 E9/L (ref 130–450)
PMV BLD AUTO: 8.8 FL (ref 7–12)
POTASSIUM SERPL-SCNC: 4.6 MMOL/L (ref 3.5–5)
PROTHROMBIN TIME: 11.8 SEC (ref 9.3–12.4)
RBC # BLD: 2.4 E12/L (ref 3.8–5.8)
SODIUM BLD-SCNC: 137 MMOL/L (ref 132–146)
TOTAL PROTEIN: 5.3 G/DL (ref 6.4–8.3)
WBC # BLD: 4.3 E9/L (ref 4.5–11.5)

## 2018-03-15 PROCEDURE — 85014 HEMATOCRIT: CPT

## 2018-03-15 PROCEDURE — 6360000002 HC RX W HCPCS: Performed by: INTERNAL MEDICINE

## 2018-03-15 PROCEDURE — 86900 BLOOD TYPING SEROLOGIC ABO: CPT

## 2018-03-15 PROCEDURE — 85025 COMPLETE CBC W/AUTO DIFF WBC: CPT

## 2018-03-15 PROCEDURE — 6370000000 HC RX 637 (ALT 250 FOR IP): Performed by: INTERNAL MEDICINE

## 2018-03-15 PROCEDURE — 36415 COLL VENOUS BLD VENIPUNCTURE: CPT

## 2018-03-15 PROCEDURE — 1200000000 HC SEMI PRIVATE

## 2018-03-15 PROCEDURE — 82962 GLUCOSE BLOOD TEST: CPT

## 2018-03-15 PROCEDURE — 85610 PROTHROMBIN TIME: CPT

## 2018-03-15 PROCEDURE — 86923 COMPATIBILITY TEST ELECTRIC: CPT

## 2018-03-15 PROCEDURE — 85018 HEMOGLOBIN: CPT

## 2018-03-15 PROCEDURE — 2580000003 HC RX 258: Performed by: INTERNAL MEDICINE

## 2018-03-15 PROCEDURE — 80053 COMPREHEN METABOLIC PANEL: CPT

## 2018-03-15 PROCEDURE — C9113 INJ PANTOPRAZOLE SODIUM, VIA: HCPCS | Performed by: INTERNAL MEDICINE

## 2018-03-15 PROCEDURE — 82330 ASSAY OF CALCIUM: CPT

## 2018-03-15 PROCEDURE — 2580000003 HC RX 258: Performed by: NURSE PRACTITIONER

## 2018-03-15 PROCEDURE — 86901 BLOOD TYPING SEROLOGIC RH(D): CPT

## 2018-03-15 PROCEDURE — P9016 RBC LEUKOCYTES REDUCED: HCPCS

## 2018-03-15 PROCEDURE — 86850 RBC ANTIBODY SCREEN: CPT

## 2018-03-15 RX ORDER — DOCUSATE SODIUM 100 MG/1
100 CAPSULE, LIQUID FILLED ORAL DAILY
Status: DISCONTINUED | OUTPATIENT
Start: 2018-03-15 | End: 2018-03-21 | Stop reason: HOSPADM

## 2018-03-15 RX ORDER — BUMETANIDE 0.25 MG/ML
1 INJECTION, SOLUTION INTRAMUSCULAR; INTRAVENOUS ONCE
Status: COMPLETED | OUTPATIENT
Start: 2018-03-15 | End: 2018-03-16

## 2018-03-15 RX ORDER — AMLODIPINE BESYLATE 10 MG/1
10 TABLET ORAL NIGHTLY
Status: DISCONTINUED | OUTPATIENT
Start: 2018-03-15 | End: 2018-03-21 | Stop reason: HOSPADM

## 2018-03-15 RX ORDER — 0.9 % SODIUM CHLORIDE 0.9 %
250 INTRAVENOUS SOLUTION INTRAVENOUS ONCE
Status: COMPLETED | OUTPATIENT
Start: 2018-03-15 | End: 2018-03-16

## 2018-03-15 RX ADMIN — INSULIN LISPRO 6 UNITS: 100 INJECTION, SOLUTION INTRAVENOUS; SUBCUTANEOUS at 17:07

## 2018-03-15 RX ADMIN — PANTOPRAZOLE SODIUM 40 MG: 40 INJECTION, POWDER, FOR SOLUTION INTRAVENOUS at 22:47

## 2018-03-15 RX ADMIN — MIRTAZAPINE 7.5 MG: 15 TABLET, FILM COATED ORAL at 22:47

## 2018-03-15 RX ADMIN — MULTIPLE VITAMINS W/ MINERALS TAB 1 TABLET: TAB at 09:07

## 2018-03-15 RX ADMIN — INSULIN LISPRO 4 UNITS: 100 INJECTION, SOLUTION INTRAVENOUS; SUBCUTANEOUS at 22:48

## 2018-03-15 RX ADMIN — SODIUM CHLORIDE 250 ML: 9 INJECTION, SOLUTION INTRAVENOUS at 23:52

## 2018-03-15 RX ADMIN — Medication 1 SPRAY: at 22:53

## 2018-03-15 RX ADMIN — DONEPEZIL HYDROCHLORIDE 5 MG: 5 TABLET, FILM COATED ORAL at 22:47

## 2018-03-15 RX ADMIN — Medication 10 ML: at 09:06

## 2018-03-15 RX ADMIN — Medication 10 ML: at 22:53

## 2018-03-15 RX ADMIN — Medication 400 MG: at 09:07

## 2018-03-15 RX ADMIN — PANTOPRAZOLE SODIUM 40 MG: 40 INJECTION, POWDER, FOR SOLUTION INTRAVENOUS at 09:06

## 2018-03-15 RX ADMIN — SODIUM CHLORIDE 125 MG: 9 INJECTION, SOLUTION INTRAVENOUS at 09:26

## 2018-03-15 RX ADMIN — Medication 1 SPRAY: at 09:07

## 2018-03-15 RX ADMIN — CLONIDINE HYDROCHLORIDE 0.1 MG: 0.1 TABLET ORAL at 09:07

## 2018-03-15 RX ADMIN — ATORVASTATIN CALCIUM 80 MG: 40 TABLET, FILM COATED ORAL at 22:47

## 2018-03-15 RX ADMIN — INSULIN LISPRO 6 UNITS: 100 INJECTION, SOLUTION INTRAVENOUS; SUBCUTANEOUS at 11:30

## 2018-03-15 RX ADMIN — Medication 10 ML: at 23:53

## 2018-03-15 RX ADMIN — AMLODIPINE BESYLATE 10 MG: 10 TABLET ORAL at 22:47

## 2018-03-15 RX ADMIN — Medication: at 09:08

## 2018-03-15 RX ADMIN — ATENOLOL 25 MG: 25 TABLET ORAL at 09:07

## 2018-03-15 RX ADMIN — INSULIN GLARGINE 10 UNITS: 100 INJECTION, SOLUTION SUBCUTANEOUS at 22:47

## 2018-03-15 ASSESSMENT — PAIN SCALES - GENERAL
PAINLEVEL_OUTOF10: 0

## 2018-03-15 NOTE — PROGRESS NOTES
[facility] Hospitalist Progress Note    Admitting Date and Time: 3/7/2018  7:34 PM  Admit Dx: GI BLEED  UNKNOWN  -    Subjective: The patient was seen and examined.  Denies any complaints this AM.     vancomycin  1,500 mg Intravenous See Admin Instructions    ferric gluconate (FERRLECIT) IVPB  125 mg Intravenous Daily    amLODIPine  5 mg Oral Nightly    calcitRIOL  0.25 mcg Oral Q MWF    atenolol  25 mg Oral Daily    vitamin D  50,000 Units Oral Weekly    atorvastatin  80 mg Oral Nightly    cloNIDine  0.1 mg Oral Daily    donepezil  5 mg Oral Nightly    fluticasone  1 spray Nasal BID    insulin glargine  10 Units Subcutaneous Nightly    insulin lispro  2-10 Units Subcutaneous 4x Daily AC & HS    magnesium oxide  400 mg Oral Daily    mirtazapine  7.5 mg Oral Nightly    therapeutic multivitamin-minerals  1 tablet Oral Daily    pantoprazole  40 mg Intravenous BID    mupirocin   Topical Daily    collagenase   Topical Daily    sodium chloride flush  10 mL Intravenous Q12H    sodium chloride flush  10 mL Intravenous 2 times per day       ammonium lactate  PRN   guaiFENesin-dextromethorphan 10 mL Q4H PRN   ondansetron 4 mg Q6H PRN   glucose 15 g PRN   dextrose 12.5 g PRN   glucagon (rDNA) 1 mg PRN   dextrose 100 mL/hr PRN   sodium chloride flush 10 mL PRN   acetaminophen 650 mg Q4H PRN        Objective:    BP (!) 168/60   Pulse 60   Temp 96.1 °F (35.6 °C) (Axillary)   Resp 18   Ht 6' (1.829 m)   Wt 159 lb 14.4 oz (72.5 kg)   SpO2 94%   BMI 21.69 kg/m²   General Appearance: alert and oriented to person, place and time  Skin: warm and dry, no rash or erythema  Head: normocephalic and atraumatic  Eyes: extraocular eye movements intact  Pulmonary/Chest: clear to auscultation bilaterally- no wheezes, rales or rhonchi, normal air movement, no respiratory distress  Cardiovascular: normal rate and normal S1 and S2  Abdomen: soft, non-tender, non-distended, normal bowel sounds, no masses or organomegaly  Extremities: both lower extremities bandaged  Musculoskeletal: normal range of motion, no joint swelling, deformity or tenderness      Recent Labs      03/13/18   0350  03/14/18   1320  03/15/18   0015   NA  137  138  137   K  4.5  4.5  4.6   CL  103  105  104   CO2  25  24  24   BUN  26*  25*  30*   CREATININE  1.7*  1.7*  1.7*   GLUCOSE  336*  345*  144*   CALCIUM  8.0*  7.9*  7.9*       Recent Labs      03/13/18   0350   03/14/18   0045  03/14/18   1320  03/15/18   0015   WBC  5.2   --    --   4.6  4.3*   RBC  2.43*   --    --   2.71*  2.40*   HGB  7.4*   < >  7.5*  8.3*  7.2*   HCT  23.6*   < >  24.3*  26.7*  23.2*   MCV  97.1   --    --   98.5  96.7   MCH  30.5   --    --   30.6  30.0   MCHC  31.4*   --    --   31.1*  31.0*   RDW  16.9*   --    --   17.1*  16.7*   PLT  466*   --    --   455*  324   MPV  7.9   --    --   8.2  8.8    < > = values in this interval not displayed.        CBC:   Lab Results   Component Value Date    WBC 4.3 03/15/2018    RBC 2.40 03/15/2018    HGB 7.2 03/15/2018    HCT 23.2 03/15/2018    MCV 96.7 03/15/2018    MCH 30.0 03/15/2018    MCHC 31.0 03/15/2018    RDW 16.7 03/15/2018     03/15/2018    MPV 8.8 03/15/2018     CBC with Differential:    Lab Results   Component Value Date    WBC 4.3 03/15/2018    RBC 2.40 03/15/2018    HGB 7.2 03/15/2018    HCT 23.2 03/15/2018     03/15/2018    MCV 96.7 03/15/2018    MCH 30.0 03/15/2018    MCHC 31.0 03/15/2018    RDW 16.7 03/15/2018    SEGSPCT 49 09/12/2013    LYMPHOPCT 34.8 03/15/2018    MONOPCT 4.2 03/15/2018    BASOPCT 0.0 03/15/2018    MONOSABS 0.18 03/15/2018    LYMPHSABS 1.50 03/15/2018    EOSABS 0.41 03/15/2018    BASOSABS 0.00 03/15/2018     CMP:    Lab Results   Component Value Date     03/15/2018    K 4.6 03/15/2018    K 5.7 03/08/2018     03/15/2018    CO2 24 03/15/2018    BUN 30 03/15/2018    CREATININE 1.7 03/15/2018    GFRAA 49 03/15/2018    LABGLOM 49 03/15/2018    GLUCOSE 144 03/15/2018 protein-calorie malnutrition ([facility]  Resolved Problems:    * No resolved hospital problems. *      Plan:    GI bleed:      Was transfused 2 units previously and now on IV iron      Scope done by GI           ?  TERESITA:      Stable      Nephro on board      Not sure if ckd       Stable        Losartan stopped    DM:     Diabetic diet      accu checks    Left first toe non healing ulcer     Podiatry on board     ID on board, suspecting possible osteo           Electronically signed by Teddy Gomez MD on 3/15/2018 at 2:05 PM

## 2018-03-15 NOTE — PROGRESS NOTES
PROGRESS NOTE    Patient Presents with/Seen in Consultation For      *Upper GI bleed--big drop in Hgb, FOBT positive in ER  CHIEF COMPLAINT:  Low H/H on blood work at [facility]       Subjective:     Patient states he is constipated,\"feels like I need to go\". Patient reports to feeling good otherwise, no pain. He is scheduled to go for foot surgery tomorrow. Denies melena, hematochezia, or hematemesis. Review of Systems  Aside from what was mentioned in the PMH and HPI, essentially unremarkable, all others negative. Objective:     Vitals: BP (!) 168/60   Pulse 60   Temp 96.1 °F (35.6 °C) (Axillary)   Resp 18   Ht 6' (1.829 m)   Wt 159 lb 14.4 oz (72.5 kg)   SpO2 94%   BMI 21.69 kg/m²     General appearance: alert, awake, laying in bed, and cooperative  Eyes: conjunctiva pale, corneas clear. PERRL.   Lungs: clear to auscultation bilaterally  Heart: regular rate and rhythm, no murmur, 2+ pulses; 2+ edema to BLE  Abdomen: soft, non-tender; bowel sounds normal; no masses,  no organomegaly; watt patent and draining yellow urine  Extremities: extremities with 2+ edema; dressings dry and intact to BLE  Pulses: 2+ and symmetric  Skin: Skin color, texture, turgor normal.   Neurologic: Grossly normal      vancomycin 1.5 g in dextrose 5% 300 mL IVPB See Admin Instructions   ferric gluconate (FERRLECIT) 125 mg in sodium chloride 0.9 % 100 mL IVPB Daily   amLODIPine (NORVASC) tablet 5 mg Nightly   calcitRIOL (ROCALTROL) capsule 0.25 mcg Q MWF   atenolol (TENORMIN) tablet 25 mg Daily   vitamin D (ERGOCALCIFEROL) capsule 50,000 Units Weekly   ammonium lactate (LAC-HYDRIN) 12 % lotion PRN   atorvastatin (LIPITOR) tablet 80 mg Nightly   cloNIDine (CATAPRES) tablet 0.1 mg Daily   guaiFENesin-dextromethorphan (ROBITUSSIN DM) 100-10 MG/5ML syrup 10 mL Q4H PRN   donepezil (ARICEPT) tablet 5 mg Nightly   fluticasone (FLONASE) 50 MCG/ACT nasal spray 1 spray BID   insulin glargine (LANTUS) injection vial 10 Units Nightly   insulin lispro (HUMALOG) injection vial 2-10 Units 4x Daily AC & HS   magnesium oxide (MAG-OX) tablet 400 mg Daily   mirtazapine (REMERON) tablet 7.5 mg Nightly   therapeutic multivitamin-minerals 1 tablet Daily   ondansetron (ZOFRAN) injection 4 mg Q6H PRN   pantoprazole (PROTONIX) injection 40 mg BID   glucose (GLUTOSE) 40 % oral gel 15 g PRN   dextrose 50 % solution 12.5 g PRN   glucagon (rDNA) injection 1 mg PRN   dextrose 5 % solution PRN   mupirocin (BACTROBAN) 2 % ointment Daily   collagenase ointment Daily   sodium chloride flush 0.9 % injection 10 mL Q12H   sodium chloride flush 0.9 % injection 10 mL 2 times per day   sodium chloride flush 0.9 % injection 10 mL PRN   acetaminophen (TYLENOL) tablet 650 mg Q4H PRN        Data Review  Recent Labs      03/13/18   0350  03/14/18   1320  03/15/18   0015   NA  137  138  137   K  4.5  4.5  4.6   CL  103  105  104   CO2  25  24  24   BUN  26*  25*  30*   CREATININE  1.7*  1.7*  1.7*   GLUCOSE  336*  345*  144*   CALCIUM  8.0*  7.9*  7.9*   ALKPHOS  97  93  78   ALT  9  9  9   AST  12  14  13   PROT  5.5*  5.8*  5.3*   LABALBU  2.7*  2.8*  2.4*       Recent Labs      03/13/18   0350   03/14/18   0045  03/14/18   1320  03/15/18   0015   WBC  5.2   --    --   4.6  4.3*   RBC  2.43*   --    --   2.71*  2.40*   HGB  7.4*   < >  7.5*  8.3*  7.2*   HCT  23.6*   < >  24.3*  26.7*  23.2*   MCV  97.1   --    --   98.5  96.7   MCH  30.5   --    --   30.6  30.0   MCHC  31.4*   --    --   31.1*  31.0*   RDW  16.9*   --    --   17.1*  16.7*   PLT  466*   --    --   455*  324   MPV  7.9   --    --   8.2  8.8    < > = values in this interval not displayed.        No components found for: CHLPL    Lab Results   Component Value Date    TRIG 113 05/28/2016    TRIG 62 09/21/2015    TRIG 183 (H) 01/10/2015       Lab Results   Component Value Date    HDL 49 05/28/2016    HDL 43 09/21/2015    HDL 40 01/10/2015       Lab Results   Component Value Date    LDLCALC 32 05/28/2016    LDLCALC 148 (H) 09/21/2015    LDLCALC 150 (H) 01/10/2015       Lab Results   Component Value Date    LABVLDL 23 05/28/2016    LABVLDL 12 09/21/2015    LABVLDL 37 01/10/2015      PT/INR:    Lab Results   Component Value Date    PROTIME 11.8 03/15/2018    PROTIME 11.1 05/17/2012    INR 1.0 03/15/2018     IRON:    Lab Results   Component Value Date    IRON 66 03/08/2018     FERRITIN:    Lab Results   Component Value Date    FERRITIN 53 03/08/2018         Assessment:   Principal Problem:    Acute upper GI bleed  Active Problems:  · Anemia, normocytic, normochromic felt secondary to blood loss - FOBT (+)--S/P transfusion with 2 U PRBC's  · Hx ETOH abuse  · Diabetes Mellitus, type 2  · PVD and CVA currently on Plavix and Pletal  · TERESITA  · Grade A LA classification GERD, minimal gastritis, biopsy pending to rule out H. pylori infection, severe duodenitis (EGD 3/9/18). · Colonoscopy per Dr Lizeth Avilez on 3/12/18 with suboptimal prep demonstrating diffuse diverticulosis , melanosis coli otherwise unremarkable      Plan:     · Colace 100mg QHS  · Continue Protonix as ordered  · Continue to monitor CBC, CMP daily   · Defer co-morbidities to others  · Continue to follow    We will follow closely with you.     Discussed with Dr. Kady Garduno developed by JAYE Hamilton on 3/15/2018 at 12:38 PM for Dr. Lizeth Avilez

## 2018-03-15 NOTE — PROGRESS NOTES
Department of Internal Medicine  Nephrology Attending Progress Note        SUBJECTIVE:  The patient is being seen for AKIon CKD    3/15/18: Pt resting in bed  denies abd pain or nausea and no SOB or CP-states his appetite is very good    Physical Exam:    Vitals:    03/15/18 1015   BP: (!) 168/60   Pulse:    Resp:    Temp:    SpO2:        General Appearance:  awake, alert, , in no acute distress  Skin:  Skin color, texture, turgor normal. No rashes or lesions. Neck:  neck- supple, no mass, non-tender  Lungs:  Normal expansion. Clear to auscultation. No rales, rhonchi, or wheezing. Heart:  Heart regular rate and rhythm     Abdominal: Abdomen soft, non-tender. BS normal. No masses,  No organomegaly  Extremities: Extremities cool to touch, pink, with no edema.   Peripheral Pulses:  decreased    DATA:    CBC with Differential:    Lab Results   Component Value Date    WBC 4.3 03/15/2018    RBC 2.40 03/15/2018    HGB 7.2 03/15/2018    HCT 23.2 03/15/2018     03/15/2018    MCV 96.7 03/15/2018    MCH 30.0 03/15/2018    MCHC 31.0 03/15/2018    RDW 16.7 03/15/2018    SEGSPCT 49 09/12/2013    LYMPHOPCT 34.8 03/15/2018    MONOPCT 4.2 03/15/2018    BASOPCT 0.0 03/15/2018    MONOSABS 0.18 03/15/2018    LYMPHSABS 1.50 03/15/2018    EOSABS 0.41 03/15/2018    BASOSABS 0.00 03/15/2018     BMP:    Lab Results   Component Value Date     03/15/2018    K 4.6 03/15/2018    K 5.7 03/08/2018     03/15/2018    CO2 24 03/15/2018    BUN 30 03/15/2018    LABALBU 2.4 03/15/2018    LABALBU 4.2 05/17/2012    CREATININE 1.7 03/15/2018    CALCIUM 7.9 03/15/2018    GFRAA 49 03/15/2018    LABGLOM 49 03/15/2018    GLUCOSE 144 03/15/2018    GLUCOSE 242 05/17/2012          vancomycin  1,500 mg Intravenous See Admin Instructions    ferric gluconate (FERRLECIT) IVPB  125 mg Intravenous Daily    amLODIPine  5 mg Oral Nightly    calcitRIOL  0.25 mcg Oral Q MWF    atenolol  25 mg Oral Daily    vitamin D  50,000 Units Oral Weekly

## 2018-03-15 NOTE — PROGRESS NOTES
Lab Results   Component Value Date    WBC 4.3 03/15/2018    RBC 2.40 03/15/2018    HGB 7.2 03/15/2018    HCT 23.2 03/15/2018     03/15/2018    MCV 96.7 03/15/2018    MCH 30.0 03/15/2018    MCHC 31.0 03/15/2018    RDW 16.7 03/15/2018    SEGSPCT 49 09/12/2013    LYMPHOPCT 34.8 03/15/2018    MONOPCT 4.2 03/15/2018    BASOPCT 0.0 03/15/2018    MONOSABS 0.18 03/15/2018    LYMPHSABS 1.50 03/15/2018    EOSABS 0.41 03/15/2018    BASOSABS 0.00 03/15/2018     CMP:    Lab Results   Component Value Date     03/15/2018    K 4.6 03/15/2018    K 5.7 03/08/2018     03/15/2018    CO2 24 03/15/2018    BUN 30 03/15/2018    CREATININE 1.7 03/15/2018    GFRAA 49 03/15/2018    LABGLOM 49 03/15/2018    GLUCOSE 144 03/15/2018    GLUCOSE 242 05/17/2012    PROT 5.3 03/15/2018    LABALBU 2.4 03/15/2018    LABALBU 4.2 05/17/2012    CALCIUM 7.9 03/15/2018    BILITOT <0.2 03/15/2018    ALKPHOS 78 03/15/2018    AST 13 03/15/2018    ALT 9 03/15/2018       BP (!) 180/80   Pulse 60   Temp 96.1 °F (35.6 °C) (Axillary)   Resp 18   Ht 6' (1.829 m)   Wt 159 lb 14.4 oz (72.5 kg)   SpO2 94%   BMI 21.69 kg/m²     Physical Exam  Const/Neuro- unchanged, no signs of acute distress, Alert  ENMT- Within Normal Limits, Normocephalic, mucous membranes pink/moist, No thrush  Neck: Neck supple  Heart- Regular, Rate, Rhythm- no murmur appreciated. Lungs- clear to ascultation. Respirations even and nonlabored. Abdomen- Soft, bowel sounds positive, non tender  Musculo/Extremities-  Equal and symmetrical, no edema. No tenderness. Skin:  Warm and dry, free from rashes. EXPOSED BONE W/O CELLULITIS OR DRAINAGE                         FROM LEFT FOOT  Cultures reviewed    Radiology reviewed  XR CHEST STANDARD (2 VW)   Final Result      NM GI BLOOD LOSS   Final Result      No findings to suggest upper lower gastrointestinal bleed. US RETROPERITONEAL COMPLETE   Final Result      1.  Findings are suggestive of nonobstructing right

## 2018-03-16 ENCOUNTER — APPOINTMENT (OUTPATIENT)
Dept: GENERAL RADIOLOGY | Age: 68
DRG: 244 | End: 2018-03-16
Payer: MEDICAID

## 2018-03-16 LAB
ALBUMIN SERPL-MCNC: 3 G/DL (ref 3.5–5.2)
ALP BLD-CCNC: 82 U/L (ref 40–129)
ALT SERPL-CCNC: 8 U/L (ref 0–40)
ANION GAP SERPL CALCULATED.3IONS-SCNC: 12 MMOL/L (ref 7–16)
AST SERPL-CCNC: 15 U/L (ref 0–39)
ATYPICAL LYMPHOCYTE RELATIVE PERCENT: 4 % (ref 0–4)
BASOPHILS ABSOLUTE: 0 E9/L (ref 0–0.2)
BASOPHILS RELATIVE PERCENT: 0 % (ref 0–2)
BILIRUB SERPL-MCNC: 0.3 MG/DL (ref 0–1.2)
BLOOD BANK DISPENSE STATUS: NORMAL
BLOOD BANK DISPENSE STATUS: NORMAL
BLOOD BANK PRODUCT CODE: NORMAL
BLOOD BANK PRODUCT CODE: NORMAL
BPU ID: NORMAL
BPU ID: NORMAL
BUN BLDV-MCNC: 30 MG/DL (ref 8–23)
BURR CELLS: ABNORMAL
CALCIUM IONIZED: 1.27 MMOL/L (ref 1.15–1.33)
CALCIUM SERPL-MCNC: 8.7 MG/DL (ref 8.6–10.2)
CHLORIDE BLD-SCNC: 106 MMOL/L (ref 98–107)
CO2: 26 MMOL/L (ref 22–29)
CREAT SERPL-MCNC: 1.8 MG/DL (ref 0.7–1.2)
DESCRIPTION BLOOD BANK: NORMAL
DESCRIPTION BLOOD BANK: NORMAL
EOSINOPHILS ABSOLUTE: 0.26 E9/L (ref 0.05–0.5)
EOSINOPHILS RELATIVE PERCENT: 5 % (ref 0–6)
GFR AFRICAN AMERICAN: 46
GFR NON-AFRICAN AMERICAN: 46 ML/MIN/1.73
GLUCOSE BLD-MCNC: 114 MG/DL (ref 74–109)
HCT VFR BLD CALC: 34.2 % (ref 37–54)
HCT VFR BLD CALC: 36.9 % (ref 37–54)
HEMOGLOBIN: 10.7 G/DL (ref 12.5–16.5)
HEMOGLOBIN: 11.2 G/DL (ref 12.5–16.5)
HYPOCHROMIA: ABNORMAL
LYMPHOCYTES ABSOLUTE: 1.73 E9/L (ref 1.5–4)
LYMPHOCYTES RELATIVE PERCENT: 30 % (ref 20–42)
MAGNESIUM: 2.3 MG/DL (ref 1.6–2.6)
MCH RBC QN AUTO: 29 PG (ref 26–35)
MCHC RBC AUTO-ENTMCNC: 31.3 % (ref 32–34.5)
MCV RBC AUTO: 92.7 FL (ref 80–99.9)
METER GLUCOSE: 101 MG/DL (ref 70–110)
METER GLUCOSE: 112 MG/DL (ref 70–110)
METER GLUCOSE: 238 MG/DL (ref 70–110)
METER GLUCOSE: 92 MG/DL (ref 70–110)
MONOCYTES ABSOLUTE: 0.36 E9/L (ref 0.1–0.95)
MONOCYTES RELATIVE PERCENT: 7 % (ref 2–12)
NEUTROPHILS ABSOLUTE: 2.75 E9/L (ref 1.8–7.3)
NEUTROPHILS RELATIVE PERCENT: 54 % (ref 43–80)
OVALOCYTES: ABNORMAL
PDW BLD-RTO: 16.9 FL (ref 11.5–15)
PHOSPHORUS: 4.6 MG/DL (ref 2.5–4.5)
PLATELET # BLD: 418 E9/L (ref 130–450)
PMV BLD AUTO: 8.2 FL (ref 7–12)
POIKILOCYTES: ABNORMAL
POLYCHROMASIA: ABNORMAL
POTASSIUM SERPL-SCNC: 4.4 MMOL/L (ref 3.5–5)
RBC # BLD: 3.69 E12/L (ref 3.8–5.8)
SODIUM BLD-SCNC: 144 MMOL/L (ref 132–146)
TARGET CELLS: ABNORMAL
TOTAL PROTEIN: 6.1 G/DL (ref 6.4–8.3)
WBC # BLD: 5.1 E9/L (ref 4.5–11.5)

## 2018-03-16 PROCEDURE — 36415 COLL VENOUS BLD VENIPUNCTURE: CPT

## 2018-03-16 PROCEDURE — 6360000002 HC RX W HCPCS: Performed by: INTERNAL MEDICINE

## 2018-03-16 PROCEDURE — 36430 TRANSFUSION BLD/BLD COMPNT: CPT

## 2018-03-16 PROCEDURE — 6370000000 HC RX 637 (ALT 250 FOR IP): Performed by: INTERNAL MEDICINE

## 2018-03-16 PROCEDURE — 80053 COMPREHEN METABOLIC PANEL: CPT

## 2018-03-16 PROCEDURE — 1200000000 HC SEMI PRIVATE

## 2018-03-16 PROCEDURE — C9113 INJ PANTOPRAZOLE SODIUM, VIA: HCPCS | Performed by: INTERNAL MEDICINE

## 2018-03-16 PROCEDURE — 85018 HEMOGLOBIN: CPT

## 2018-03-16 PROCEDURE — 85025 COMPLETE CBC W/AUTO DIFF WBC: CPT

## 2018-03-16 PROCEDURE — 97530 THERAPEUTIC ACTIVITIES: CPT

## 2018-03-16 PROCEDURE — 82962 GLUCOSE BLOOD TEST: CPT

## 2018-03-16 PROCEDURE — 82330 ASSAY OF CALCIUM: CPT

## 2018-03-16 PROCEDURE — 84100 ASSAY OF PHOSPHORUS: CPT

## 2018-03-16 PROCEDURE — P9016 RBC LEUKOCYTES REDUCED: HCPCS

## 2018-03-16 PROCEDURE — 2580000003 HC RX 258: Performed by: INTERNAL MEDICINE

## 2018-03-16 PROCEDURE — 2500000003 HC RX 250 WO HCPCS: Performed by: INTERNAL MEDICINE

## 2018-03-16 PROCEDURE — 2580000003 HC RX 258: Performed by: NURSE PRACTITIONER

## 2018-03-16 PROCEDURE — 83735 ASSAY OF MAGNESIUM: CPT

## 2018-03-16 PROCEDURE — 85014 HEMATOCRIT: CPT

## 2018-03-16 RX ADMIN — DONEPEZIL HYDROCHLORIDE 5 MG: 5 TABLET, FILM COATED ORAL at 22:21

## 2018-03-16 RX ADMIN — Medication 10 ML: at 10:15

## 2018-03-16 RX ADMIN — Medication: at 10:15

## 2018-03-16 RX ADMIN — INSULIN GLARGINE 10 UNITS: 100 INJECTION, SOLUTION SUBCUTANEOUS at 22:37

## 2018-03-16 RX ADMIN — INSULIN LISPRO 4 UNITS: 100 INJECTION, SOLUTION INTRAVENOUS; SUBCUTANEOUS at 22:34

## 2018-03-16 RX ADMIN — ATENOLOL 25 MG: 25 TABLET ORAL at 10:15

## 2018-03-16 RX ADMIN — MIRTAZAPINE 7.5 MG: 15 TABLET, FILM COATED ORAL at 22:20

## 2018-03-16 RX ADMIN — BUMETANIDE 1 MG: 0.25 INJECTION INTRAMUSCULAR; INTRAVENOUS at 03:20

## 2018-03-16 RX ADMIN — SODIUM CHLORIDE 125 MG: 9 INJECTION, SOLUTION INTRAVENOUS at 10:15

## 2018-03-16 RX ADMIN — PANTOPRAZOLE SODIUM 40 MG: 40 INJECTION, POWDER, FOR SOLUTION INTRAVENOUS at 10:14

## 2018-03-16 RX ADMIN — AMLODIPINE BESYLATE 10 MG: 10 TABLET ORAL at 22:21

## 2018-03-16 RX ADMIN — ATORVASTATIN CALCIUM 80 MG: 40 TABLET, FILM COATED ORAL at 22:23

## 2018-03-16 RX ADMIN — CALCITRIOL 0.25 MCG: 0.25 CAPSULE, LIQUID FILLED ORAL at 22:21

## 2018-03-16 RX ADMIN — PANTOPRAZOLE SODIUM 40 MG: 40 INJECTION, POWDER, FOR SOLUTION INTRAVENOUS at 22:20

## 2018-03-16 RX ADMIN — Medication 10 ML: at 22:20

## 2018-03-16 RX ADMIN — Medication 10 ML: at 10:16

## 2018-03-16 RX ADMIN — Medication 1 SPRAY: at 22:24

## 2018-03-16 RX ADMIN — Medication 1 SPRAY: at 10:16

## 2018-03-16 ASSESSMENT — PAIN SCALES - GENERAL
PAINLEVEL_OUTOF10: 0

## 2018-03-16 NOTE — PROGRESS NOTES
14.4 oz (72.5 kg)   SpO2 100%   BMI 21.69 kg/m²   Wound appearance is similar to last recheck      (this wound was originally measured on:)  Wound 03/07/18 Left left great toe distal-Wound Length (cm): 3 cm  Wound 03/08/18 right great toe medial-Wound Length (cm): 2 cm  Wound 03/08/18 right 2nd medial toe-Wound Length (cm): 3 cm  Wound 03/07/18 Left left great toe distal-Wound Width (cm): 2.5 cm  Wound 03/08/18 right great toe medial-Wound Width (cm): 4 cm  Wound 03/08/18 right 2nd medial toe-Wound Width (cm): 0.4 cm  Wound 03/07/18 Left left great toe distal-Wound Depth (cm) : obs  Wound 03/08/18 right great toe medial-Wound Depth (cm) : 0.2  Wound 03/08/18 right 2nd medial toe-Wound Depth (cm) : obs (dark red, dry)   Measurements shown are from today's visit. Wound 03/07/18 Left left great toe distal-Wound Assessment: White, Yellow  Wound 03/08/18 right great toe medial-Wound Assessment: Brown, Drainage  Wound 03/08/18 right 2nd medial toe-Wound Assessment: Fragile     Wound 03/07/18 Left left great toe distal-Wound Assessment: White, Yellow  Wound 03/08/18 right great toe medial-Wound Assessment: Brown, Drainage  Wound 03/08/18 right 2nd medial toe-Wound Assessment: Fragile  Wound 03/07/18 Left left great toe distal-Louise-wound Assessment: Calloused, Dry  Wound 03/08/18 right great toe medial-Louise-wound Assessment: Fragile  Wound 03/08/18 right 2nd medial toe-Louise-wound Assessment:  (thickened)  Wound 03/07/18 Left left great toe distal-Drainage Amount: Small  Wound 03/08/18 right great toe medial-Drainage Amount:  Moderate  Wound 03/08/18 right 2nd medial toe-Drainage Amount: Scant  Wound 03/07/18 Left left great toe distal-Drainage Description: Yellow, Serous  Wound 03/08/18 right great toe medial-Drainage Description: Serosanguinous  Wound 03/08/18 right 2nd medial toe-Drainage Description: Tan  Wound 03/07/18 Left left great toe distal-Odor: None  Wound 03/08/18 right great toe medial-Odor: None  Wound [facility]   Hematocrit 21.7   37.0 - 54.0 % Final 03/15/2018  5:42  [address] [place] [address] Lab       Plan:   Plan for wound - Dress per physician order  Treatment:     Compression : No   Dressing : Kaylie Fu left 1st digit   Additional Therapy : santyl right 1st digit     1. Discussed appropriate care of this wound. Dispensed dressing supplies and instructions on their use. Wound redressed. 2. Patient instructions were given. 3. Follow up: PRN  4. Noted MARY BETH's    5. Xray evaluated   6. Continue conservative treatment, thank you Dr Peng Torrez for input. 7. NPO after midnight, possible surgery tomorrow.      Electronically signed by Joni Phillips DPM on 3/15/2018 at 10:49 PM

## 2018-03-16 NOTE — PROGRESS NOTES
Occupational Therapy  Patient treatment attempted this PM.  Patient being transported to a test upon therapist arrival.  Will attempt at a later time.   Shalini Vuong MELISSA/L [id number]

## 2018-03-16 NOTE — ANESTHESIA PRE PROCEDURE
Department of Anesthesiology  Preprocedure Note       Name:  Eduar Dickerson Age:  80 y.o.  :  1950                                          MRN:  [id number]         Date:  3/19/2018      Surgeon: Walker Perry):  Zenaida Lloyd DPM    Procedure: Procedure(s):  RESECTION OF BONE CURRETAGE LEFT 1ST DIGIT    Medications prior to admission:   Prior to Admission medications    Medication Sig Start Date End Date Taking?  Authorizing Provider   fluticasone (FLONASE) 50 MCG/ACT nasal spray 1 spray by Nasal route 2 times daily   Yes Historical Provider, MD   Multiple Vitamins-Minerals (THERAPEUTIC MULTIVITAMIN-MINERALS) tablet Take 1 tablet by mouth daily   Yes Historical Provider, MD   Dextromethorphan-guaiFENesin  MG/5ML SYRP Take 15 mLs by mouth every 4 hours as needed for Cough   Yes Historical Provider, MD   cloNIDine (CATAPRES) 0.1 MG/24HR Place 1 patch onto the skin once a week     Historical Provider, MD   mirtazapine (REMERON) 15 MG tablet Take 7.5 mg by mouth nightly    Historical Provider, MD   insulin glargine (LANTUS) 100 UNIT/ML injection vial Inject 10 Units into the skin nightly 18   Kimberlee Tang DO   vitamin D (ERGOCALCIFEROL) 07260 UNITS capsule Take 1 capsule by mouth once a week  Patient taking differently: Take 50,000 Units by mouth once a week  9/15/16   HUMBERTO Ford   donepezil (ARICEPT) 5 MG tablet Take 1 tablet by mouth nightly 9/15/16   HUMBERTO Caraballo   insulin lispro (HUMALOG) 100 UNIT/ML injection vial Inject 0-6 Units into the skin 3 times daily (with meals) Use low dose glucose algorithm 9/15/16   HUMBERTO Caraballo   atenolol (TENORMIN) 25 MG tablet Take 1 tablet by mouth daily  Patient taking differently: Take 50 mg by mouth daily  16   Shawna Segovia MD   metFORMIN (GLUCOPHAGE) 1000 MG tablet Take 1 tablet by mouth 2 times daily (with meals) 16   Maxx Vidal MD   magnesium oxide (MAG-OX) 400 (240 MG) MG tablet Take 1 Ariana Mcdowell, DPJAKE        collagenase ointment   Topical Daily Gino Rodriguez        sodium chloride flush 0.9 % injection 10 mL  10 mL Intravenous Q12H Diane Marsh CNP   10 mL at 03/18/18 2245    sodium chloride flush 0.9 % injection 10 mL  10 mL Intravenous 2 times per day La Fraga MD   10 mL at 03/18/18 2245    sodium chloride flush 0.9 % injection 10 mL  10 mL Intravenous PRN La Fraga MD   10 mL at 03/15/18 2353    acetaminophen (TYLENOL) tablet 650 mg  650 mg Oral Q4H PRN La Fraga MD           Allergies:  No Known Allergies    Problem List:    Patient Active Problem List   Diagnosis Code    DJD (degenerative joint disease) M19.90    Type II diabetes mellitus, uncontrolled ([facility] E11.65    Hyperlipoproteinemia E78.5    Neuropathy ([facility]) G62.9    Depression F32.9    Tobacco dependence F17.200    Pain in lower limb M79.606    PVD (peripheral vascular disease) with claudication ([facility]) I73.9    Onychomycosis B35.1    Atherosclerosis of native arteries of extremity with rest pain ([facility]) I70.229    Right sided weakness R53.1    Atherosclerosis of native artery of right lower extremity with rest pain ([facility]) I70.221    Pulmonary nodule R91.1    DKA (diabetic ketoacidoses) ([facility]) E13.10    Diabetic ulcer of right foot associated with type 2 diabetes mellitus ([facility] E11.621, L97.519    UTI (urinary tract infection) N39.0    DKA (diabetic ketoacidoses) ([facility]) E13.10    Disorientation R41.0    Hyponatremia E87.1    Sepsis ([facility]) A41.9    HTN (hypertension) I10    Suprapubic catheter ([facility]) Z93.59    Acute upper GI bleed K92.2    Acute blood loss anemia D62    TERESITA (acute kidney injury) ([facility] N17.9    Hyperkalemia, mild E87.5    DM2 (diabetes mellitus, type 2) ([facility]) E11.9    Hyperlipidemia E78.5    Chronic arterial ischemic stroke, with residual expressive aphasia etc I69.30    Possible dementia F03.90    Incontinence of urine (now s/p SPC) and stool R32

## 2018-03-16 NOTE — PROGRESS NOTES
[facility] Hospitalist Progress Note    Admitting Date and Time: 3/7/2018  7:34 PM  Admit Dx: GI BLEED  UNKNOWN  -  UNK  UNK    Subjective: The patient was seen and examined while laying in bed. He was frustrated not knowing the time of his procedure. He denies any other complaints.      vancomycin  1,500 mg Intravenous See Admin Instructions    amLODIPine  10 mg Oral Nightly    docusate sodium  100 mg Oral Daily    ferric gluconate (FERRLECIT) IVPB  125 mg Intravenous Daily    calcitRIOL  0.25 mcg Oral Q MWF    atenolol  25 mg Oral Daily    vitamin D  50,000 Units Oral Weekly    atorvastatin  80 mg Oral Nightly    cloNIDine  0.1 mg Oral Daily    donepezil  5 mg Oral Nightly    fluticasone  1 spray Nasal BID    insulin glargine  10 Units Subcutaneous Nightly    insulin lispro  2-10 Units Subcutaneous 4x Daily AC & HS    magnesium oxide  400 mg Oral Daily    mirtazapine  7.5 mg Oral Nightly    therapeutic multivitamin-minerals  1 tablet Oral Daily    pantoprazole  40 mg Intravenous BID    mupirocin   Topical Daily    collagenase   Topical Daily    sodium chloride flush  10 mL Intravenous Q12H    sodium chloride flush  10 mL Intravenous 2 times per day       ammonium lactate  PRN   guaiFENesin-dextromethorphan 10 mL Q4H PRN   ondansetron 4 mg Q6H PRN   glucose 15 g PRN   dextrose 12.5 g PRN   glucagon (rDNA) 1 mg PRN   dextrose 100 mL/hr PRN   sodium chloride flush 10 mL PRN   acetaminophen 650 mg Q4H PRN        Objective:    BP (!) 169/77   Pulse 56   Temp 98.7 °F (37.1 °C) (Oral)   Resp 16   Ht 6' (1.829 m)   Wt 164 lb 9.6 oz (74.7 kg)   SpO2 97%   BMI 22.32 kg/m²   General Appearance: alert and oriented to person, place and time  Skin: warm and dry, no rash or erythema  Head: normocephalic and atraumatic  Eyes: extraocular eye movements intact  Pulmonary/Chest: clear to auscultation bilaterally- no wheezes, rales or rhonchi, normal air movement, no respiratory distress  Cardiovascular: Non-healing ulcer ([facility] - of left hallux    Moderate protein-calorie malnutrition ([facility]  Resolved Problems:    * No resolved hospital problems.  *      Plan:    GI bleed:      Was transfused 2 units previously and now on IV iron      Scope done by GI      Capsule endoscopy as an outpatient            ? TERESITA:      Stable      Nephro on board      Not sure if ckd       Stable        Losartan stopped     DM:     Diabetic diet      accu checks     Left first toe non healing ulcer     Podiatry on board     ID on board, suspecting possible osteo     Surgery today        Electronically signed by Dany Tavares MD on 3/16/2018 at 12:01 PM

## 2018-03-16 NOTE — PROGRESS NOTES
Spoke to Dr. Paula Negrete and surgery will be cancelled for tonight. Keep patient NPO for possible OR tomorrow pending labs.   Electronically signed by Susan Chaudhry RN on 3/15/2018 at 8:07 PM

## 2018-03-16 NOTE — PROGRESS NOTES
Weekly    atorvastatin  80 mg Oral Nightly    cloNIDine  0.1 mg Oral Daily    donepezil  5 mg Oral Nightly    fluticasone  1 spray Nasal BID    insulin glargine  10 Units Subcutaneous Nightly    insulin lispro  2-10 Units Subcutaneous 4x Daily AC & HS    magnesium oxide  400 mg Oral Daily    mirtazapine  7.5 mg Oral Nightly    therapeutic multivitamin-minerals  1 tablet Oral Daily    pantoprazole  40 mg Intravenous BID    mupirocin   Topical Daily    collagenase   Topical Daily    sodium chloride flush  10 mL Intravenous Q12H    sodium chloride flush  10 mL Intravenous 2 times per day      dextrose       ammonium lactate, guaiFENesin-dextromethorphan, ondansetron, glucose, dextrose, glucagon (rDNA), dextrose, sodium chloride flush, acetaminophen     3/12/2018 9:00 AM    EXAM: US RETROPERITONEAL COMPLETE    COMPARISON: None    INDICATION:  acute renal failure      FINDINGS:  Right kidney measures 11.4 x 4.5 x 5.4 cm is. Left kidney measures  13.1 x 6.5 x 4.9 cm. Echogenic, shadowing focus is associated with the  right kidney suggestive of a calculus measuring up to 6 mm. There is  no hydronephrosis. Phelps catheter present. Impression:       1. Findings are suggestive of nonobstructing right renal calculus  measuring approximately 6 mm. 2. No evidence of hydronephrosis. 3. Minimal areas of hypoechogenicity are seen adjacent to both kidneys  which could suggest minimal bilateral perinephric fluid collections or  nonspecific perinephric edema.          IMPRESSION/RECOMMENDATIONS:      Stage II TERESITA on ckd G2 with a baseline serum cr from 2017 of 0.7mg/dl -no significant change in cr today-excellent UO-losartan on hold-follow    Anemia in the setting of the GI Bleed- hold on parul iron stores ferritin 53 with an iron sat 20%-continue on IV Fe++-adequate B12 and  Folate-HgB 7.2-->6.7 last Pm and transfused with 2 units PRBC-HgB 10.7 this AM    Hx of obstructive uropathy, s/p suprapubic catheter-no

## 2018-03-16 NOTE — PROGRESS NOTES
PROGRESS NOTE    Patient Presents with/Seen in Consultation For      *Upper GI bleed--big drop in Hgb, FOBT positive in ER  CHIEF COMPLAINT:  Low H/H on blood work at [facility]       Subjective:     Patient states he has been NPO since midnight, going for foot surgery today; depending on lab values. Denies any N/V. States he is having lower abdominal pain, \"not bad\". Reports he hasn't moved his bowels, + flatus. Denies any visible bleeding. POC reviewed with patient, all questions answered. Review of Systems  Aside from what was mentioned in the PMH and HPI, essentially unremarkable, all others negative. Objective:     Vitals: BP (!) 169/77   Pulse 56   Temp 98.7 °F (37.1 °C) (Oral)   Resp 16   Ht 6' (1.829 m)   Wt 164 lb 9.6 oz (74.7 kg)   SpO2 97%   BMI 22.32 kg/m²     General appearance: alert, awake, laying in bed, and cooperative  Eyes: conjunctiva pale, corneas clear. PERRL. Lungs: clear with rhonchi auscultation bilaterally  Heart: regular rate and rhythm, no murmur, 2+ pulses; 2+ edema to BLE  Abdomen: soft, lower abdominal tenderness with palpitations no guarding or rebound; bowel sounds normal; no masses,  no organomegaly; watt patent and draining yellow urine  Extremities: extremities with 2+ edema; dressings dry and intact to BLE  Pulses: 2+ and symmetric  Skin: AA Skin color, dry texture, turgor poor. DSD to left toes. Kerlix wrap to left foot.   Neurologic: Grossly normal      vancomycin 1.5 g in dextrose 5% 300 mL IVPB See Admin Instructions   amLODIPine (NORVASC) tablet 10 mg Nightly   docusate sodium (COLACE) capsule 100 mg Daily   0.9 % sodium chloride bolus Once   ferric gluconate (FERRLECIT) 125 mg in sodium chloride 0.9 % 100 mL IVPB Daily   calcitRIOL (ROCALTROL) capsule 0.25 mcg Q MWF   atenolol (TENORMIN) tablet 25 mg Daily   vitamin D (ERGOCALCIFEROL) capsule 50,000 Units Weekly   ammonium lactate (LAC-HYDRIN) 12 % lotion PRN   atorvastatin (LIPITOR) tablet 80 mg Nightly

## 2018-03-16 NOTE — PROGRESS NOTES
9.6 oz (74.7 kg)   SpO2 97%   BMI 22.32 kg/m²   Wound appearance is similar to last recheck      (this wound was originally measured on:)  Wound 03/07/18 Left left great toe distal-Wound Length (cm): 3 cm  Wound 03/08/18 right great toe medial-Wound Length (cm): 2 cm  Wound 03/08/18 right 2nd medial toe-Wound Length (cm): 3 cm  Wound 03/07/18 Left left great toe distal-Wound Width (cm): 2.5 cm  Wound 03/08/18 right great toe medial-Wound Width (cm): 4 cm  Wound 03/08/18 right 2nd medial toe-Wound Width (cm): 0.4 cm  Wound 03/07/18 Left left great toe distal-Wound Depth (cm) : obs  Wound 03/08/18 right great toe medial-Wound Depth (cm) : 0.2  Wound 03/08/18 right 2nd medial toe-Wound Depth (cm) : obs (dark red, dry)   Measurements shown are from today's visit.   Wound 03/07/18 Left left great toe distal-Wound Assessment: White, Yellow  Wound 03/08/18 right great toe medial-Wound Assessment: Brown, Drainage  Wound 03/08/18 right 2nd medial toe-Wound Assessment: Fragile     Wound 03/07/18 Left left great toe distal-Wound Assessment: White, Yellow  Wound 03/08/18 right great toe medial-Wound Assessment: Brown, Drainage  Wound 03/08/18 right 2nd medial toe-Wound Assessment: Fragile  Wound 03/07/18 Left left great toe distal-Louise-wound Assessment: Calloused, Dry  Wound 03/08/18 right great toe medial-Louise-wound Assessment: Fragile  Wound 03/08/18 right 2nd medial toe-Louise-wound Assessment:  (thickened)  Wound 03/07/18 Left left great toe distal-Drainage Amount: None  Wound 03/08/18 right great toe medial-Drainage Amount: None  Wound 03/08/18 right 2nd medial toe-Drainage Amount: None  Wound 03/07/18 Left left great toe distal-Drainage Description: Yellow, Serous  Wound 03/08/18 right great toe medial-Drainage Description: Serosanguinous  Wound 03/08/18 right 2nd medial toe-Drainage Description: Tan  Wound 03/07/18 Left left great toe distal-Odor: None  Wound 03/08/18 right great toe medial-Odor: None  Wound 03/08/18

## 2018-03-17 LAB
ALBUMIN SERPL-MCNC: 2.7 G/DL (ref 3.5–5.2)
ALP BLD-CCNC: 82 U/L (ref 40–129)
ALT SERPL-CCNC: 12 U/L (ref 0–40)
ANION GAP SERPL CALCULATED.3IONS-SCNC: 10 MMOL/L (ref 7–16)
AST SERPL-CCNC: 17 U/L (ref 0–39)
BASOPHILS ABSOLUTE: 0.01 E9/L (ref 0–0.2)
BASOPHILS RELATIVE PERCENT: 0.3 % (ref 0–2)
BILIRUB SERPL-MCNC: 0.3 MG/DL (ref 0–1.2)
BUN BLDV-MCNC: 29 MG/DL (ref 8–23)
CALCIUM SERPL-MCNC: 8.9 MG/DL (ref 8.6–10.2)
CHLORIDE BLD-SCNC: 105 MMOL/L (ref 98–107)
CO2: 26 MMOL/L (ref 22–29)
CREAT SERPL-MCNC: 1.8 MG/DL (ref 0.7–1.2)
EOSINOPHILS ABSOLUTE: 0.38 E9/L (ref 0.05–0.5)
EOSINOPHILS RELATIVE PERCENT: 9.8 % (ref 0–6)
GFR AFRICAN AMERICAN: 46
GFR NON-AFRICAN AMERICAN: 46 ML/MIN/1.73
GLUCOSE BLD-MCNC: 143 MG/DL (ref 74–109)
HCT VFR BLD CALC: 33.9 % (ref 37–54)
HCT VFR BLD CALC: 35.4 % (ref 37–54)
HEMOGLOBIN: 10.8 G/DL (ref 12.5–16.5)
HEMOGLOBIN: 11.1 G/DL (ref 12.5–16.5)
IMMATURE GRANULOCYTES #: 0.02 E9/L
IMMATURE GRANULOCYTES %: 0.5 % (ref 0–5)
LYMPHOCYTES ABSOLUTE: 1.51 E9/L (ref 1.5–4)
LYMPHOCYTES RELATIVE PERCENT: 38.8 % (ref 20–42)
MCH RBC QN AUTO: 29.3 PG (ref 26–35)
MCHC RBC AUTO-ENTMCNC: 31.9 % (ref 32–34.5)
MCV RBC AUTO: 91.9 FL (ref 80–99.9)
METER GLUCOSE: 118 MG/DL (ref 70–110)
METER GLUCOSE: 204 MG/DL (ref 70–110)
METER GLUCOSE: 211 MG/DL (ref 70–110)
METER GLUCOSE: 306 MG/DL (ref 70–110)
MONOCYTES ABSOLUTE: 0.18 E9/L (ref 0.1–0.95)
MONOCYTES RELATIVE PERCENT: 4.6 % (ref 2–12)
NEUTROPHILS ABSOLUTE: 1.79 E9/L (ref 1.8–7.3)
NEUTROPHILS RELATIVE PERCENT: 46 % (ref 43–80)
PDW BLD-RTO: 17.4 FL (ref 11.5–15)
PLATELET # BLD: 418 E9/L (ref 130–450)
PMV BLD AUTO: 8.2 FL (ref 7–12)
POTASSIUM SERPL-SCNC: 4.2 MMOL/L (ref 3.5–5)
RBC # BLD: 3.69 E12/L (ref 3.8–5.8)
SODIUM BLD-SCNC: 141 MMOL/L (ref 132–146)
TOTAL PROTEIN: 6.1 G/DL (ref 6.4–8.3)
WBC # BLD: 3.9 E9/L (ref 4.5–11.5)

## 2018-03-17 PROCEDURE — 6360000002 HC RX W HCPCS: Performed by: INTERNAL MEDICINE

## 2018-03-17 PROCEDURE — C9113 INJ PANTOPRAZOLE SODIUM, VIA: HCPCS | Performed by: INTERNAL MEDICINE

## 2018-03-17 PROCEDURE — 85014 HEMATOCRIT: CPT

## 2018-03-17 PROCEDURE — 6370000000 HC RX 637 (ALT 250 FOR IP): Performed by: INTERNAL MEDICINE

## 2018-03-17 PROCEDURE — 85025 COMPLETE CBC W/AUTO DIFF WBC: CPT

## 2018-03-17 PROCEDURE — 6370000000 HC RX 637 (ALT 250 FOR IP): Performed by: CLINICAL NURSE SPECIALIST

## 2018-03-17 PROCEDURE — 82962 GLUCOSE BLOOD TEST: CPT

## 2018-03-17 PROCEDURE — 2580000003 HC RX 258: Performed by: NURSE PRACTITIONER

## 2018-03-17 PROCEDURE — 2580000003 HC RX 258: Performed by: INTERNAL MEDICINE

## 2018-03-17 PROCEDURE — 36415 COLL VENOUS BLD VENIPUNCTURE: CPT

## 2018-03-17 PROCEDURE — 85018 HEMOGLOBIN: CPT

## 2018-03-17 PROCEDURE — 6370000000 HC RX 637 (ALT 250 FOR IP): Performed by: NURSE PRACTITIONER

## 2018-03-17 PROCEDURE — 1200000000 HC SEMI PRIVATE

## 2018-03-17 PROCEDURE — 80053 COMPREHEN METABOLIC PANEL: CPT

## 2018-03-17 RX ORDER — PANTOPRAZOLE SODIUM 40 MG/1
40 TABLET, DELAYED RELEASE ORAL
Status: DISCONTINUED | OUTPATIENT
Start: 2018-03-17 | End: 2018-03-21 | Stop reason: HOSPADM

## 2018-03-17 RX ORDER — HYDRALAZINE HYDROCHLORIDE 25 MG/1
25 TABLET, FILM COATED ORAL EVERY 8 HOURS SCHEDULED
Status: DISCONTINUED | OUTPATIENT
Start: 2018-03-17 | End: 2018-03-18

## 2018-03-17 RX ADMIN — Medication 1 SPRAY: at 23:12

## 2018-03-17 RX ADMIN — DONEPEZIL HYDROCHLORIDE 5 MG: 5 TABLET, FILM COATED ORAL at 23:11

## 2018-03-17 RX ADMIN — AMLODIPINE BESYLATE 10 MG: 10 TABLET ORAL at 23:10

## 2018-03-17 RX ADMIN — Medication 10 ML: at 23:16

## 2018-03-17 RX ADMIN — INSULIN LISPRO 4 UNITS: 100 INJECTION, SOLUTION INTRAVENOUS; SUBCUTANEOUS at 17:33

## 2018-03-17 RX ADMIN — MIRTAZAPINE 7.5 MG: 15 TABLET, FILM COATED ORAL at 23:11

## 2018-03-17 RX ADMIN — MULTIPLE VITAMINS W/ MINERALS TAB 1 TABLET: TAB at 08:37

## 2018-03-17 RX ADMIN — CLONIDINE HYDROCHLORIDE 0.1 MG: 0.1 TABLET ORAL at 08:37

## 2018-03-17 RX ADMIN — DOCUSATE SODIUM 100 MG: 100 CAPSULE, LIQUID FILLED ORAL at 08:38

## 2018-03-17 RX ADMIN — ATORVASTATIN CALCIUM 80 MG: 40 TABLET, FILM COATED ORAL at 23:11

## 2018-03-17 RX ADMIN — INSULIN GLARGINE 10 UNITS: 100 INJECTION, SOLUTION SUBCUTANEOUS at 23:11

## 2018-03-17 RX ADMIN — ATENOLOL 25 MG: 25 TABLET ORAL at 08:38

## 2018-03-17 RX ADMIN — INSULIN LISPRO 4 UNITS: 100 INJECTION, SOLUTION INTRAVENOUS; SUBCUTANEOUS at 12:13

## 2018-03-17 RX ADMIN — PANTOPRAZOLE SODIUM 40 MG: 40 INJECTION, POWDER, FOR SOLUTION INTRAVENOUS at 08:38

## 2018-03-17 RX ADMIN — HYDRALAZINE HYDROCHLORIDE 25 MG: 25 TABLET, FILM COATED ORAL at 23:11

## 2018-03-17 RX ADMIN — SODIUM CHLORIDE 125 MG: 9 INJECTION, SOLUTION INTRAVENOUS at 08:44

## 2018-03-17 RX ADMIN — Medication 10 ML: at 08:40

## 2018-03-17 RX ADMIN — Medication 400 MG: at 08:38

## 2018-03-17 RX ADMIN — Medication 1 SPRAY: at 08:42

## 2018-03-17 RX ADMIN — PANTOPRAZOLE SODIUM 40 MG: 40 TABLET, DELAYED RELEASE ORAL at 17:37

## 2018-03-17 RX ADMIN — INSULIN LISPRO 8 UNITS: 100 INJECTION, SOLUTION INTRAVENOUS; SUBCUTANEOUS at 23:12

## 2018-03-17 RX ADMIN — Medication 10 ML: at 08:38

## 2018-03-17 RX ADMIN — Medication: at 08:44

## 2018-03-17 ASSESSMENT — PAIN SCALES - GENERAL
PAINLEVEL_OUTOF10: 0

## 2018-03-17 NOTE — PROGRESS NOTES
PROGRESS NOTE    Patient Presents with/Seen in Consultation For      *Upper GI bleed--big drop in Hgb, FOBT positive in ER  CHIEF COMPLAINT:  Low H/H on blood work at [facility]       Subjective:     Patient seen sitting up in bed, eating dinner in no acute distress. Denies abdominal pain, nausea or emesis. Tolerating diet well. Reports passing flatus and states he feels like he will move his bowels today. Review of Systems  Aside from what was mentioned in the PMH and HPI, essentially unremarkable, all others negative. Objective:     Vitals: BP (!) 192/83   Pulse 60   Temp 98.1 °F (36.7 °C) (Oral)   Resp 16   Ht 6' (1.829 m)   Wt 142 lb 9 oz (64.7 kg)   SpO2 100%   BMI 19.33 kg/m²     General appearance: alert, awake, sitting up in bed, and cooperative  Eyes: conjunctivae/corneas clear. PERRL. Lungs: rhonchi scattered throughout lung fields  Heart: regular rate and rhythm, no murmur, 2+ pulses;   Without edema   Abdomen: soft, non-tender; bowel sounds normal; no masses,  no organomegaly; suprapubic cath intact with claudio colored urine   Extremities:  No edema, cyanosis or clubbing   Pulses: 2+ and symmetric  Skin: Skin color, texture, turgor normal.   Neurologic: Grossly normal      hydrALAZINE (APRESOLINE) tablet 25 mg 3 times per day   vancomycin 1.5 g in dextrose 5% 300 mL IVPB See Admin Instructions   amLODIPine (NORVASC) tablet 10 mg Nightly   docusate sodium (COLACE) capsule 100 mg Daily   ferric gluconate (FERRLECIT) 125 mg in sodium chloride 0.9 % 100 mL IVPB Daily   calcitRIOL (ROCALTROL) capsule 0.25 mcg Q MWF   atenolol (TENORMIN) tablet 25 mg Daily   vitamin D (ERGOCALCIFEROL) capsule 50,000 Units Weekly   ammonium lactate (LAC-HYDRIN) 12 % lotion PRN   atorvastatin (LIPITOR) tablet 80 mg Nightly   cloNIDine (CATAPRES) tablet 0.1 mg Daily   guaiFENesin-dextromethorphan (ROBITUSSIN DM) 100-10 MG/5ML syrup 10 mL Q4H PRN   donepezil (ARICEPT) tablet 5 mg Nightly   fluticasone (FLONASE) 50 MCG/ACT nasal spray 1 spray BID   insulin glargine (LANTUS) injection vial 10 Units Nightly   insulin lispro (HUMALOG) injection vial 2-10 Units 4x Daily AC & HS   magnesium oxide (MAG-OX) tablet 400 mg Daily   mirtazapine (REMERON) tablet 7.5 mg Nightly   therapeutic multivitamin-minerals 1 tablet Daily   ondansetron (ZOFRAN) injection 4 mg Q6H PRN   pantoprazole (PROTONIX) injection 40 mg BID   glucose (GLUTOSE) 40 % oral gel 15 g PRN   dextrose 50 % solution 12.5 g PRN   glucagon (rDNA) injection 1 mg PRN   dextrose 5 % solution PRN   mupirocin (BACTROBAN) 2 % ointment Daily   collagenase ointment Daily   sodium chloride flush 0.9 % injection 10 mL Q12H   sodium chloride flush 0.9 % injection 10 mL 2 times per day   sodium chloride flush 0.9 % injection 10 mL PRN   acetaminophen (TYLENOL) tablet 650 mg Q4H PRN        Data Review  Recent Labs      03/15/18   0015  03/16/18   0805  03/17/18   0305   NA  137  144  141   K  4.6  4.4  4.2   CL  104  106  105   CO2  24  26  26   BUN  30*  30*  29*   CREATININE  1.7*  1.8*  1.8*   GLUCOSE  144*  114*  143*   CALCIUM  7.9*  8.7  8.9   ALKPHOS  78  82  82   ALT  9  8  12   AST  13  15  17   PROT  5.3*  6.1*  6.1*   LABALBU  2.4*  3.0*  2.7*       Recent Labs      03/15/18   0015   03/16/18   0805  03/16/18   1727  03/17/18   0305  03/17/18   0950   WBC  4.3*   --   5.1   --   3.9*   --    RBC  2.40*   --   3.69*   --   3.69*   --    HGB  7.2*   < >  10.7*  11.2*  10.8*  11.1*   HCT  23.2*   < >  34.2*  36.9*  33.9*  35.4*   MCV  96.7   --   92.7   --   91.9   --    MCH  30.0   --   29.0   --   29.3   --    MCHC  31.0*   --   31.3*   --   31.9*   --    RDW  16.7*   --   16.9*   --   17.4*   --    PLT  324   --   418   --   418   --    MPV  8.8   --   8.2   --   8.2   --     < > = values in this interval not displayed.        Lab Results   Component Value Date    TRIG 113 05/28/2016    TRIG 62 09/21/2015    TRIG 183 (H) 01/10/2015       Lab Results   Component Value Date    HDL 49 05/28/2016    HDL 43 09/21/2015    HDL 40 01/10/2015       Lab Results   Component Value Date    LDLCALC 32 05/28/2016    LDLCALC 148 (H) 09/21/2015    LDLCALC 150 (H) 01/10/2015       Lab Results   Component Value Date    LABVLDL 23 05/28/2016    LABVLDL 12 09/21/2015    LABVLDL 37 01/10/2015      PT/INR:    Lab Results   Component Value Date    PROTIME 11.8 03/15/2018    PROTIME 11.1 05/17/2012    INR 1.0 03/15/2018     IRON:    Lab Results   Component Value Date    IRON 66 03/08/2018     FERRITIN:    Lab Results   Component Value Date    FERRITIN 53 03/08/2018         Assessment:   Principal Problem:    Acute upper GI bleed  Active Problems:  ? Anemia, normocytic, normochromic felt secondary to blood loss - FOBT (+)--S/P transfusion with 2 U PRBC's on admission and again on 3/15/18  ? Hx ETOH abuse  ? Diabetes Mellitus, type 2  ? PVD and CVA currently on Plavix and Pletal  ? TERESITA  ? Grade A LA classification GERD, minimal gastritis, biopsy pending to rule out H. pylori infection, severe duodenitis (EGD 3/9/18). ? Colonoscopy per Dr Norm Trevino on 3/12/18 with suboptimal prep demonstrating diffuse diverticulosis , melanosis coli otherwise unremarkable  ? Ischemic lesion to LLE 1st toe--for OR on Monday      Plan:     · UGI W SBFT--per nursing won't be done until Mon or Tues  ? Continue Protonix as ordered  ? Continue to monitor CBC, CMP daily, transfuse per PCP  ? Defer co-morbidities to others  ? Continue to follow    We will follow closely with you. Discussed with Dr. Stella Tavarez who is covering for Dr Isaac Hope developed by Dr. Stella Tavarez    Electronically signed by Calderon Marin NP on 3/17/2018 at 5:29 PM for Dr. Stella Tavarez who is covering for Dr Norm Trevino will be out of town returning 3/30/18.  Dr. Stella Tavarez covering in Dr. Joshua keating.

## 2018-03-17 NOTE — PROGRESS NOTES
-   03/16/18 2030 (!) 179/87 98.2 °F (36.8 °C) Oral 58 16 90 % -       CBC with Differential:    Lab Results   Component Value Date    WBC 3.9 03/17/2018    RBC 3.69 03/17/2018    HGB 11.1 03/17/2018    HCT 35.4 03/17/2018     03/17/2018    MCV 91.9 03/17/2018    MCH 29.3 03/17/2018    MCHC 31.9 03/17/2018    RDW 17.4 03/17/2018    SEGSPCT 49 09/12/2013    LYMPHOPCT 38.8 03/17/2018    MONOPCT 4.6 03/17/2018    BASOPCT 0.3 03/17/2018    MONOSABS 0.18 03/17/2018    LYMPHSABS 1.51 03/17/2018    EOSABS 0.38 03/17/2018    BASOSABS 0.01 03/17/2018     CMP:    Lab Results   Component Value Date     03/17/2018    K 4.2 03/17/2018    K 5.7 03/08/2018     03/17/2018    CO2 26 03/17/2018    BUN 29 03/17/2018    CREATININE 1.8 03/17/2018    GFRAA 46 03/17/2018    LABGLOM 46 03/17/2018    GLUCOSE 143 03/17/2018    GLUCOSE 242 05/17/2012    PROT 6.1 03/17/2018    LABALBU 2.7 03/17/2018    LABALBU 4.2 05/17/2012    CALCIUM 8.9 03/17/2018    BILITOT 0.3 03/17/2018    ALKPHOS 82 03/17/2018    AST 17 03/17/2018    ALT 12 03/17/2018       BP (!) 192/83   Pulse 60   Temp 98.1 °F (36.7 °C) (Oral)   Resp 16   Ht 6' (1.829 m)   Wt 142 lb 9 oz (64.7 kg)   SpO2 100%   BMI 19.33 kg/m²     Physical Exam  Const/Neuro- unchanged, no signs of acute distress, Alert  ENMT- Within Normal Limits, Normocephalic, mucous membranes pink/moist, No thrush  Neck: Neck supple  Heart- Regular, Rate, Rhythm- no murmur appreciated. Lungs- clear to ascultation. Respirations even and nonlabored. Abdomen- Soft, bowel sounds positive, non tender  Musculo/Extremities-  Equal and symmetrical, no edema. No tenderness. Skin:  Warm and dry, free from rashes.   EXPOSED BONE W/O CELLULITIS OR DRAINAGE                         FROM LEFT FOOT--WRAPPED        Cultures reviewed  [facility] CX    Radiology reviewed  XR CHEST STANDARD (2 VW)   Final Result      NM GI BLOOD LOSS   Final Result      No findings to suggest upper lower gastrointestinal bleed.            US RETROPERITONEAL COMPLETE   Final Result      1. Findings are suggestive of nonobstructing right renal calculus   measuring approximately 6 mm. 2. No evidence of hydronephrosis. 3. Minimal areas of hypoechogenicity are seen adjacent to both kidneys   which could suggest minimal bilateral perinephric fluid collections or   nonspecific perinephric edema. VL Ankle Art Brachial Indices Extremity Bilateral   Final Result   Abnormal bilateral ankle-brachial indices, consistent with   severe arterial disease in the bilateral lower extremities. XR FOOT LEFT (MIN 3 VIEWS)   Final Result      XR UGI & SMALL BOWEL    (Results Pending)       Assessment  ISCHEMIC LESION TO LOWER EXT C EXPOSED BONE LEFT 1ST TOE  MIX ORG WOUND INFECTION  LEUKOPENIA    Plan     D/W PODIATRY      VANCO IV for  [address]      Electronically signed by JAYE Lopes on 3/17/2018 at 11:49 AM     Pt seen and examined. Above discussed agree with advanced practice nurse. Labs, cultures, and radiographs reviewed. Face to Face encounter occurred. Changes made as necessary.

## 2018-03-17 NOTE — PROGRESS NOTES
Oral Nightly    cloNIDine  0.1 mg Oral Daily    donepezil  5 mg Oral Nightly    fluticasone  1 spray Nasal BID    insulin glargine  10 Units Subcutaneous Nightly    insulin lispro  2-10 Units Subcutaneous 4x Daily AC & HS    magnesium oxide  400 mg Oral Daily    mirtazapine  7.5 mg Oral Nightly    therapeutic multivitamin-minerals  1 tablet Oral Daily    pantoprazole  40 mg Intravenous BID    mupirocin   Topical Daily    collagenase   Topical Daily    sodium chloride flush  10 mL Intravenous Q12H    sodium chloride flush  10 mL Intravenous 2 times per day      dextrose       ammonium lactate, guaiFENesin-dextromethorphan, ondansetron, glucose, dextrose, glucagon (rDNA), dextrose, sodium chloride flush, acetaminophen     3/12/2018 9:00 AM    EXAM: US RETROPERITONEAL COMPLETE    COMPARISON: None    INDICATION:  acute renal failure      FINDINGS:  Right kidney measures 11.4 x 4.5 x 5.4 cm is. Left kidney measures  13.1 x 6.5 x 4.9 cm. Echogenic, shadowing focus is associated with the  right kidney suggestive of a calculus measuring up to 6 mm. There is  no hydronephrosis. Phelps catheter present. Impression:       1. Findings are suggestive of nonobstructing right renal calculus  measuring approximately 6 mm. 2. No evidence of hydronephrosis. 3. Minimal areas of hypoechogenicity are seen adjacent to both kidneys  which could suggest minimal bilateral perinephric fluid collections or  nonspecific perinephric edema.          IMPRESSION/RECOMMENDATIONS:      Stage II TERESITA on ckd G2 with a baseline serum cr from 2017 of 0.7mg/dl -no significant change in cr today-excellent UO-losartan on hold-follow    Anemia in the setting of the GI Bleed- hold on parul iron stores ferritin 53 with an iron sat 20%-continue on IV Fe++-adequate B12 and  Folate-HgB 7.2-->6.7 3/15 and transfused with 2 units PRBC 3/16-HgB 11.1  this AM    Hx of obstructive uropathy, s/p suprapubic catheter-no obstruction noted on US    Secondary hyperparathyroidism -adequate  Vit D 39 -hypocalcemia in the setting of hypoalbuminemia- ionized Ca++ WNL and PO4 WNL    HTN with CKd [id number] stable  with the decreased  Atenolol-BP above goal <130/80 added HS amlodipine 3/13-BP remains above goal <130/80 titrated the amlodipine 3/15-add hydralazine    Ischemic L Great toe-OR held last PM due to the HgB-for OR today      Carlito Obrien MD  3/17/2018 4:52 PM

## 2018-03-17 NOTE — PROGRESS NOTES
9 oz (64.7 kg)   SpO2 100%   BMI 19.33 kg/m²   Wound appearance is similar to last recheck      (this wound was originally measured on:)  Wound 03/07/18 Left left great toe distal-Wound Length (cm): 3 cm  Wound 03/08/18 right great toe medial-Wound Length (cm): 2 cm  Wound 03/08/18 right 2nd medial toe-Wound Length (cm): 3 cm  Wound 03/07/18 Left left great toe distal-Wound Width (cm): 2.5 cm  Wound 03/08/18 right great toe medial-Wound Width (cm): 4 cm  Wound 03/08/18 right 2nd medial toe-Wound Width (cm): 0.4 cm  Wound 03/07/18 Left left great toe distal-Wound Depth (cm) : obs  Wound 03/08/18 right great toe medial-Wound Depth (cm) : 0.2  Wound 03/08/18 right 2nd medial toe-Wound Depth (cm) : obs (dark red, dry)   Measurements shown are from today's visit.   Wound 03/07/18 Left left great toe distal-Wound Assessment: White, Yellow  Wound 03/08/18 right great toe medial-Wound Assessment: Brown, Drainage  Wound 03/08/18 right 2nd medial toe-Wound Assessment: Fragile     Wound 03/07/18 Left left great toe distal-Wound Assessment: White, Yellow  Wound 03/08/18 right great toe medial-Wound Assessment: Brown, Drainage  Wound 03/08/18 right 2nd medial toe-Wound Assessment: Fragile  Wound 03/07/18 Left left great toe distal-Louise-wound Assessment: Calloused, Dry  Wound 03/08/18 right great toe medial-Louise-wound Assessment: Fragile  Wound 03/08/18 right 2nd medial toe-Louise-wound Assessment:  (thickened)  Wound 03/07/18 Left left great toe distal-Drainage Amount: None  Wound 03/08/18 right great toe medial-Drainage Amount: None  Wound 03/08/18 right 2nd medial toe-Drainage Amount: None  Wound 03/07/18 Left left great toe distal-Drainage Description: Yellow, Serous  Wound 03/08/18 right great toe medial-Drainage Description: Serosanguinous  Wound 03/08/18 right 2nd medial toe-Drainage Description: Tan  Wound 03/07/18 Left left great toe distal-Odor: None  Wound 03/08/18 right great toe medial-Odor: None  Wound 03/08/18 right 37.0 - 54.0 % Final 03/15/2018  5:42  [address] [place] [address] Lab       Plan:   Plan for wound - Dress per physician order  Treatment:     Compression : No   Dressing : [place] left 1st digit   Additional Therapy : santyl right 1st digit     1. Discussed appropriate care of this wound. Dispensed dressing supplies and instructions on their use. Wound redressed. 2. Patient instructions were given. 3. Follow up: PRN  4. Noted MARY BETH's    5. Xray evaluated   6. Continue conservative treatment, thank you Dr Moni Hughes for input.     7. NPO after midnight on Sunday, surgery scheduled Monday 3/19, 0730    Electronically signed by Adali Guardado DPM on 3/17/2018 at 9:12 AM

## 2018-03-18 LAB
ALBUMIN SERPL-MCNC: 3.2 G/DL (ref 3.5–5.2)
ALP BLD-CCNC: 95 U/L (ref 40–129)
ALT SERPL-CCNC: 10 U/L (ref 0–40)
ANION GAP SERPL CALCULATED.3IONS-SCNC: 12 MMOL/L (ref 7–16)
ANISOCYTOSIS: ABNORMAL
AST SERPL-CCNC: 17 U/L (ref 0–39)
ATYPICAL LYMPHOCYTE RELATIVE PERCENT: 2.8 % (ref 0–4)
BASOPHILS ABSOLUTE: 0 E9/L (ref 0–0.2)
BASOPHILS RELATIVE PERCENT: 0 % (ref 0–2)
BILIRUB SERPL-MCNC: 0.3 MG/DL (ref 0–1.2)
BUN BLDV-MCNC: 33 MG/DL (ref 8–23)
BURR CELLS: ABNORMAL
CALCIUM SERPL-MCNC: 9.2 MG/DL (ref 8.6–10.2)
CHLORIDE BLD-SCNC: 103 MMOL/L (ref 98–107)
CO2: 26 MMOL/L (ref 22–29)
CREAT SERPL-MCNC: 1.8 MG/DL (ref 0.7–1.2)
EOSINOPHILS ABSOLUTE: 0.61 E9/L (ref 0.05–0.5)
EOSINOPHILS RELATIVE PERCENT: 12.8 % (ref 0–6)
GFR AFRICAN AMERICAN: 46
GFR NON-AFRICAN AMERICAN: 46 ML/MIN/1.73
GLUCOSE BLD-MCNC: 142 MG/DL (ref 74–109)
HCT VFR BLD CALC: 37.3 % (ref 37–54)
HEMOGLOBIN: 11.9 G/DL (ref 12.5–16.5)
HYPOCHROMIA: ABNORMAL
LYMPHOCYTES ABSOLUTE: 1.54 E9/L (ref 1.5–4)
LYMPHOCYTES RELATIVE PERCENT: 29.4 % (ref 20–42)
MAGNESIUM: 1.9 MG/DL (ref 1.6–2.6)
MCH RBC QN AUTO: 29.1 PG (ref 26–35)
MCHC RBC AUTO-ENTMCNC: 31.9 % (ref 32–34.5)
MCV RBC AUTO: 91.2 FL (ref 80–99.9)
METER GLUCOSE: 103 MG/DL (ref 70–110)
METER GLUCOSE: 134 MG/DL (ref 70–110)
METER GLUCOSE: 237 MG/DL (ref 70–110)
MONOCYTES ABSOLUTE: 0.29 E9/L (ref 0.1–0.95)
MONOCYTES RELATIVE PERCENT: 5.5 % (ref 2–12)
NEUTROPHILS ABSOLUTE: 2.4 E9/L (ref 1.8–7.3)
NEUTROPHILS RELATIVE PERCENT: 49.5 % (ref 43–80)
NUCLEATED RED BLOOD CELLS: 0 /100 WBC
OVALOCYTES: ABNORMAL
PDW BLD-RTO: 17.2 FL (ref 11.5–15)
PHOSPHORUS: 4.7 MG/DL (ref 2.5–4.5)
PLATELET # BLD: 443 E9/L (ref 130–450)
PMV BLD AUTO: 8.1 FL (ref 7–12)
POIKILOCYTES: ABNORMAL
POTASSIUM SERPL-SCNC: 4.2 MMOL/L (ref 3.5–5)
RBC # BLD: 4.09 E12/L (ref 3.8–5.8)
SODIUM BLD-SCNC: 141 MMOL/L (ref 132–146)
TOTAL PROTEIN: 6.8 G/DL (ref 6.4–8.3)
WBC # BLD: 4.8 E9/L (ref 4.5–11.5)

## 2018-03-18 PROCEDURE — 6370000000 HC RX 637 (ALT 250 FOR IP): Performed by: CLINICAL NURSE SPECIALIST

## 2018-03-18 PROCEDURE — 6360000002 HC RX W HCPCS: Performed by: INTERNAL MEDICINE

## 2018-03-18 PROCEDURE — 82962 GLUCOSE BLOOD TEST: CPT

## 2018-03-18 PROCEDURE — 1200000000 HC SEMI PRIVATE

## 2018-03-18 PROCEDURE — 83735 ASSAY OF MAGNESIUM: CPT

## 2018-03-18 PROCEDURE — 6370000000 HC RX 637 (ALT 250 FOR IP): Performed by: INTERNAL MEDICINE

## 2018-03-18 PROCEDURE — 84100 ASSAY OF PHOSPHORUS: CPT

## 2018-03-18 PROCEDURE — 2580000003 HC RX 258: Performed by: NURSE PRACTITIONER

## 2018-03-18 PROCEDURE — 85025 COMPLETE CBC W/AUTO DIFF WBC: CPT

## 2018-03-18 PROCEDURE — 2580000003 HC RX 258: Performed by: INTERNAL MEDICINE

## 2018-03-18 PROCEDURE — 80053 COMPREHEN METABOLIC PANEL: CPT

## 2018-03-18 PROCEDURE — 36415 COLL VENOUS BLD VENIPUNCTURE: CPT

## 2018-03-18 PROCEDURE — 6370000000 HC RX 637 (ALT 250 FOR IP): Performed by: NURSE PRACTITIONER

## 2018-03-18 RX ORDER — HYDRALAZINE HYDROCHLORIDE 50 MG/1
50 TABLET, FILM COATED ORAL EVERY 6 HOURS SCHEDULED
Status: DISCONTINUED | OUTPATIENT
Start: 2018-03-18 | End: 2018-03-21 | Stop reason: HOSPADM

## 2018-03-18 RX ADMIN — AMLODIPINE BESYLATE 10 MG: 10 TABLET ORAL at 21:27

## 2018-03-18 RX ADMIN — ATORVASTATIN CALCIUM 80 MG: 40 TABLET, FILM COATED ORAL at 21:27

## 2018-03-18 RX ADMIN — Medication 1 SPRAY: at 08:41

## 2018-03-18 RX ADMIN — DOCUSATE SODIUM 100 MG: 100 CAPSULE, LIQUID FILLED ORAL at 08:40

## 2018-03-18 RX ADMIN — INSULIN LISPRO 4 UNITS: 100 INJECTION, SOLUTION INTRAVENOUS; SUBCUTANEOUS at 21:50

## 2018-03-18 RX ADMIN — Medication 400 MG: at 08:40

## 2018-03-18 RX ADMIN — HYDRALAZINE HYDROCHLORIDE 25 MG: 25 TABLET, FILM COATED ORAL at 06:12

## 2018-03-18 RX ADMIN — ATENOLOL 25 MG: 25 TABLET ORAL at 08:40

## 2018-03-18 RX ADMIN — INSULIN GLARGINE 10 UNITS: 100 INJECTION, SOLUTION SUBCUTANEOUS at 21:49

## 2018-03-18 RX ADMIN — PANTOPRAZOLE SODIUM 40 MG: 40 TABLET, DELAYED RELEASE ORAL at 06:12

## 2018-03-18 RX ADMIN — INSULIN LISPRO 4 UNITS: 100 INJECTION, SOLUTION INTRAVENOUS; SUBCUTANEOUS at 12:22

## 2018-03-18 RX ADMIN — Medication 10 ML: at 08:40

## 2018-03-18 RX ADMIN — Medication 1 SPRAY: at 22:44

## 2018-03-18 RX ADMIN — Medication 10 ML: at 22:45

## 2018-03-18 RX ADMIN — MULTIPLE VITAMINS W/ MINERALS TAB 1 TABLET: TAB at 08:40

## 2018-03-18 RX ADMIN — Medication: at 08:41

## 2018-03-18 RX ADMIN — DONEPEZIL HYDROCHLORIDE 5 MG: 5 TABLET, FILM COATED ORAL at 21:27

## 2018-03-18 RX ADMIN — PANTOPRAZOLE SODIUM 40 MG: 40 TABLET, DELAYED RELEASE ORAL at 17:53

## 2018-03-18 RX ADMIN — CLONIDINE HYDROCHLORIDE 0.1 MG: 0.1 TABLET ORAL at 08:40

## 2018-03-18 RX ADMIN — SODIUM CHLORIDE 125 MG: 9 INJECTION, SOLUTION INTRAVENOUS at 08:39

## 2018-03-18 RX ADMIN — HYDRALAZINE HYDROCHLORIDE 50 MG: 50 TABLET, FILM COATED ORAL at 17:53

## 2018-03-18 RX ADMIN — MIRTAZAPINE 7.5 MG: 15 TABLET, FILM COATED ORAL at 21:27

## 2018-03-18 ASSESSMENT — PAIN SCALES - GENERAL
PAINLEVEL_OUTOF10: 0
PAINLEVEL_OUTOF10: 0

## 2018-03-18 NOTE — PROGRESS NOTES
Value Date    LDLCALC 32 05/28/2016    LDLCALC 148 (H) 09/21/2015    LDLCALC 150 (H) 01/10/2015       Lab Results   Component Value Date    LABVLDL 23 05/28/2016    LABVLDL 12 09/21/2015    LABVLDL 37 01/10/2015      PT/INR:    Lab Results   Component Value Date    PROTIME 11.8 03/15/2018    PROTIME 11.1 05/17/2012    INR 1.0 03/15/2018     IRON:    Lab Results   Component Value Date    IRON 66 03/08/2018     FERRITIN:    Lab Results   Component Value Date    FERRITIN 53 03/08/2018         Assessment:   Principal Problem:    Acute upper GI bleed  Active Problems:  ? Anemia, normocytic, normochromic felt secondary to blood loss - FOBT (+)--S/P transfusion with 2 U PRBC's on admission and again on 3/15/18  ? Hx ETOH abuse  ? Diabetes Mellitus, type 2  ? PVD and CVA currently on Plavix and Pletal  ? TERESITA  ? Grade A LA classification GERD, minimal gastritis, biopsy pending to rule out H. pylori infection, severe duodenitis (EGD 3/9/18). ? Colonoscopy per Dr Carla Gonzalez on 3/12/18 with suboptimal prep demonstrating diffuse diverticulosis , melanosis coli otherwise unremarkable  ? Ischemic lesion to LLE 1st toe--for OR on Monday      Plan:     · Plans for OR tomorrow for ischemic lesion to left toe  · UGI W SBFT--per nursing won't be done until Mon or Tues  ? Continue Protonix as ordered  ? Continue to monitor CBC, CMP daily, transfuse per PCP  ? Defer co-morbidities to others  ? Continue to follow    We will follow closely with you. Discussed with Dr. Narda Olvera who is covering for Dr Matt Whitney developed by Dr. Narda Olvera    Electronically signed by Jessa Nava NP on 3/18/2018 at 9:55 AM for Dr. Narda Olvera who is covering for Dr Carla Gonzalez will be out of town returning 3/30/18.  Dr. Narda Olvera covering in Dr. iKrk Tompkins absence.

## 2018-03-18 NOTE — PROGRESS NOTES
54.0 % Final 03/15/2018  5:42  [address] [place] [address] Lab       Plan:   Plan for wound - Dress per physician order  Treatment:     Compression : No   Dressing : [place] left 1st digit   Additional Therapy : santyl right 1st digit     1. Discussed appropriate care of this wound. Dispensed dressing supplies and instructions on their use. Wound redressed. 2. Patient instructions were given. 3. Follow up: PRN  4. Noted MARY BETH's    5. Xray evaluated   6. Continue conservative treatment, thank you Dr Davide Roldan for input.     7. NPO after midnight on Sunday, surgery scheduled Monday 3/19, 0730    Electronically signed by Vy Quiñones DPM on 3/18/2018 at 12:51 PM

## 2018-03-18 NOTE — PROGRESS NOTES
[facility] Hospitalist Progress Note    Admitting Date and Time: 3/7/2018  7:34 PM  Admit Dx: GI BLEED  UNKNOWN  -  UNK  UNK    Subjective: The patient was seen and examined while laying in bed. He was frustrated not knowing the time of his procedure. He denies any other complaints.      hydrALAZINE  25 mg Oral 3 times per day    pantoprazole  40 mg Oral BID AC    vancomycin  1,500 mg Intravenous See Admin Instructions    amLODIPine  10 mg Oral Nightly    docusate sodium  100 mg Oral Daily    ferric gluconate (FERRLECIT) IVPB  125 mg Intravenous Daily    calcitRIOL  0.25 mcg Oral Q MWF    atenolol  25 mg Oral Daily    vitamin D  50,000 Units Oral Weekly    atorvastatin  80 mg Oral Nightly    cloNIDine  0.1 mg Oral Daily    donepezil  5 mg Oral Nightly    fluticasone  1 spray Nasal BID    insulin glargine  10 Units Subcutaneous Nightly    insulin lispro  2-10 Units Subcutaneous 4x Daily AC & HS    magnesium oxide  400 mg Oral Daily    mirtazapine  7.5 mg Oral Nightly    therapeutic multivitamin-minerals  1 tablet Oral Daily    mupirocin   Topical Daily    collagenase   Topical Daily    sodium chloride flush  10 mL Intravenous Q12H    sodium chloride flush  10 mL Intravenous 2 times per day       ammonium lactate  PRN   guaiFENesin-dextromethorphan 10 mL Q4H PRN   ondansetron 4 mg Q6H PRN   glucose 15 g PRN   dextrose 12.5 g PRN   glucagon (rDNA) 1 mg PRN   dextrose 100 mL/hr PRN   sodium chloride flush 10 mL PRN   acetaminophen 650 mg Q4H PRN        Objective:    BP (!) 160/78   Pulse 62   Temp 97.8 °F (36.6 °C) (Oral)   Resp 16   Ht 6' (1.829 m)   Wt 156 lb (70.8 kg)   SpO2 99%   BMI 21.16 kg/m²   General Appearance: alert and oriented to person, place and time  Skin: warm and dry, no rash or erythema  Head: normocephalic and atraumatic  Eyes: extraocular eye movements intact  Pulmonary/Chest: clear to auscultation bilaterally- no wheezes, rales or rhonchi, normal air movement, no 5.7 03/08/2018     03/18/2018    CO2 26 03/18/2018    BUN 33 03/18/2018    CREATININE 1.8 03/18/2018    GFRAA 46 03/18/2018    LABGLOM 46 03/18/2018    GLUCOSE 142 03/18/2018    GLUCOSE 242 05/17/2012    PROT 6.8 03/18/2018    LABALBU 3.2 03/18/2018    LABALBU 4.2 05/17/2012    CALCIUM 9.2 03/18/2018    BILITOT 0.3 03/18/2018    ALKPHOS 95 03/18/2018    AST 17 03/18/2018    ALT 10 03/18/2018     BMP:    Lab Results   Component Value Date     03/18/2018    K 4.2 03/18/2018    K 5.7 03/08/2018     03/18/2018    CO2 26 03/18/2018    BUN 33 03/18/2018    LABALBU 3.2 03/18/2018    LABALBU 4.2 05/17/2012    CREATININE 1.8 03/18/2018    CALCIUM 9.2 03/18/2018    GFRAA 46 03/18/2018    LABGLOM 46 03/18/2018    GLUCOSE 142 03/18/2018    GLUCOSE 242 05/17/2012        Radiology:   XR CHEST STANDARD (2 VW)   Final Result      NM GI BLOOD LOSS   Final Result      No findings to suggest upper lower gastrointestinal bleed. US RETROPERITONEAL COMPLETE   Final Result      1. Findings are suggestive of nonobstructing right renal calculus   measuring approximately 6 mm. 2. No evidence of hydronephrosis. 3. Minimal areas of hypoechogenicity are seen adjacent to both kidneys   which could suggest minimal bilateral perinephric fluid collections or   nonspecific perinephric edema. VL Ankle Art Brachial Indices Extremity Bilateral   Final Result   Abnormal bilateral ankle-brachial indices, consistent with   severe arterial disease in the bilateral lower extremities.                XR FOOT LEFT (MIN 3 VIEWS)   Final Result      XR UGI & SMALL BOWEL    (Results Pending)       Assessment:    Principal Problem:    Acute upper GI bleed  Active Problems:    PVD (peripheral vascular disease) with claudication (HCC)    HTN (hypertension)    Suprapubic catheter (HCC)    Acute blood loss anemia    TERESITA (acute kidney injury) ([facility] [address]    Hyperkalemia, mild    DM2 (diabetes mellitus, type 2) ([facility] [address]

## 2018-03-18 NOTE — PROGRESS NOTES
175/82 97.6 °F (36.4 °C) Oral 61 18 95 % -       CBC with Differential:    Lab Results   Component Value Date    WBC 4.8 03/18/2018    RBC 4.09 03/18/2018    HGB 11.9 03/18/2018    HCT 37.3 03/18/2018     03/18/2018    MCV 91.2 03/18/2018    MCH 29.1 03/18/2018    MCHC 31.9 03/18/2018    RDW 17.2 03/18/2018    NRBC 0.0 03/18/2018    SEGSPCT 49 09/12/2013    LYMPHOPCT 29.4 03/18/2018    MONOPCT 5.5 03/18/2018    BASOPCT 0.0 03/18/2018    MONOSABS 0.29 03/18/2018    LYMPHSABS 1.54 03/18/2018    EOSABS 0.61 03/18/2018    BASOSABS 0.00 03/18/2018     CMP:    Lab Results   Component Value Date     03/18/2018    K 4.2 03/18/2018    K 5.7 03/08/2018     03/18/2018    CO2 26 03/18/2018    BUN 33 03/18/2018    CREATININE 1.8 03/18/2018    GFRAA 46 03/18/2018    LABGLOM 46 03/18/2018    GLUCOSE 142 03/18/2018    GLUCOSE 242 05/17/2012    PROT 6.8 03/18/2018    LABALBU 3.2 03/18/2018    LABALBU 4.2 05/17/2012    CALCIUM 9.2 03/18/2018    BILITOT 0.3 03/18/2018    ALKPHOS 95 03/18/2018    AST 17 03/18/2018    ALT 10 03/18/2018       BP (!) 160/78   Pulse 62   Temp 97.8 °F (36.6 °C) (Oral)   Resp 16   Ht 6' (1.829 m)   Wt 156 lb (70.8 kg)   SpO2 99%   BMI 21.16 kg/m²     Physical Exam  Const/Neuro- unchanged, no signs of acute distress, Alert  ENMT- Within Normal Limits, Normocephalic, mucous membranes pink/moist, No thrush  Neck: Neck supple  Heart- Regular, Rate, Rhythm- no murmur appreciated. Lungs- clear to ascultation. Respirations even and nonlabored. Abdomen- Soft, bowel sounds positive, non tender  Musculo/Extremities-  Equal and symmetrical, no edema. No tenderness. Skin:  Warm and dry, free from rashes. EXPOSED BONE W/O CELLULITIS OR DRAINAGE                         FROM LEFT FOOT--WRAPPED        Cultures reviewed  [facility] CX    Radiology reviewed  XR CHEST STANDARD (2 VW)   Final Result      NM GI BLOOD LOSS   Final Result      No findings to suggest upper lower gastrointestinal bleed.

## 2018-03-19 ENCOUNTER — APPOINTMENT (OUTPATIENT)
Dept: GENERAL RADIOLOGY | Age: 68
DRG: 244 | End: 2018-03-19
Payer: MEDICAID

## 2018-03-19 VITALS — TEMPERATURE: 90.3 F | DIASTOLIC BLOOD PRESSURE: 71 MMHG | SYSTOLIC BLOOD PRESSURE: 106 MMHG | OXYGEN SATURATION: 100 %

## 2018-03-19 LAB
ALBUMIN SERPL-MCNC: 3 G/DL (ref 3.5–5.2)
ALP BLD-CCNC: 86 U/L (ref 40–129)
ALT SERPL-CCNC: 10 U/L (ref 0–40)
ANION GAP SERPL CALCULATED.3IONS-SCNC: 11 MMOL/L (ref 7–16)
AST SERPL-CCNC: 20 U/L (ref 0–39)
BASOPHILS ABSOLUTE: 0.02 E9/L (ref 0–0.2)
BASOPHILS RELATIVE PERCENT: 0.3 % (ref 0–2)
BILIRUB SERPL-MCNC: 0.4 MG/DL (ref 0–1.2)
BUN BLDV-MCNC: 28 MG/DL (ref 8–23)
CALCIUM SERPL-MCNC: 9 MG/DL (ref 8.6–10.2)
CHLORIDE BLD-SCNC: 102 MMOL/L (ref 98–107)
CO2: 27 MMOL/L (ref 22–29)
CREAT SERPL-MCNC: 1.8 MG/DL (ref 0.7–1.2)
EOSINOPHILS ABSOLUTE: 0.93 E9/L (ref 0.05–0.5)
EOSINOPHILS RELATIVE PERCENT: 14.4 % (ref 0–6)
GFR AFRICAN AMERICAN: 46
GFR NON-AFRICAN AMERICAN: 46 ML/MIN/1.73
GLUCOSE BLD-MCNC: 110 MG/DL (ref 74–109)
HCT VFR BLD CALC: 35.1 % (ref 37–54)
HEMOGLOBIN: 10.8 G/DL (ref 12.5–16.5)
IMMATURE GRANULOCYTES #: 0.02 E9/L
IMMATURE GRANULOCYTES %: 0.3 % (ref 0–5)
LYMPHOCYTES ABSOLUTE: 2.44 E9/L (ref 1.5–4)
LYMPHOCYTES RELATIVE PERCENT: 37.7 % (ref 20–42)
MAGNESIUM: 2 MG/DL (ref 1.6–2.6)
MCH RBC QN AUTO: 29.3 PG (ref 26–35)
MCHC RBC AUTO-ENTMCNC: 30.8 % (ref 32–34.5)
MCV RBC AUTO: 95.1 FL (ref 80–99.9)
METER GLUCOSE: 119 MG/DL (ref 70–110)
METER GLUCOSE: 221 MG/DL (ref 70–110)
METER GLUCOSE: 79 MG/DL (ref 70–110)
METER GLUCOSE: 88 MG/DL (ref 70–110)
MONOCYTES ABSOLUTE: 0.3 E9/L (ref 0.1–0.95)
MONOCYTES RELATIVE PERCENT: 4.6 % (ref 2–12)
NEUTROPHILS ABSOLUTE: 2.76 E9/L (ref 1.8–7.3)
NEUTROPHILS RELATIVE PERCENT: 42.7 % (ref 43–80)
PDW BLD-RTO: 17.1 FL (ref 11.5–15)
PHOSPHORUS: 4.1 MG/DL (ref 2.5–4.5)
PLATELET # BLD: 428 E9/L (ref 130–450)
PMV BLD AUTO: 8.3 FL (ref 7–12)
POTASSIUM SERPL-SCNC: 4.7 MMOL/L (ref 3.5–5)
RBC # BLD: 3.69 E12/L (ref 3.8–5.8)
SODIUM BLD-SCNC: 140 MMOL/L (ref 132–146)
TOTAL PROTEIN: 6.7 G/DL (ref 6.4–8.3)
WBC # BLD: 6.5 E9/L (ref 4.5–11.5)

## 2018-03-19 PROCEDURE — 2580000003 HC RX 258: Performed by: NURSE ANESTHETIST, CERTIFIED REGISTERED

## 2018-03-19 PROCEDURE — 74245 XR UGI & SMALL BOWEL: CPT

## 2018-03-19 PROCEDURE — 3600000002 HC SURGERY LEVEL 2 BASE: Performed by: PODIATRIST

## 2018-03-19 PROCEDURE — 6370000000 HC RX 637 (ALT 250 FOR IP): Performed by: NURSE PRACTITIONER

## 2018-03-19 PROCEDURE — 88307 TISSUE EXAM BY PATHOLOGIST: CPT

## 2018-03-19 PROCEDURE — 6370000000 HC RX 637 (ALT 250 FOR IP): Performed by: SPECIALIST

## 2018-03-19 PROCEDURE — 36415 COLL VENOUS BLD VENIPUNCTURE: CPT

## 2018-03-19 PROCEDURE — 6360000002 HC RX W HCPCS: Performed by: INTERNAL MEDICINE

## 2018-03-19 PROCEDURE — 6360000002 HC RX W HCPCS: Performed by: NURSE ANESTHETIST, CERTIFIED REGISTERED

## 2018-03-19 PROCEDURE — 88304 TISSUE EXAM BY PATHOLOGIST: CPT

## 2018-03-19 PROCEDURE — 2580000003 HC RX 258: Performed by: SPECIALIST

## 2018-03-19 PROCEDURE — 6370000000 HC RX 637 (ALT 250 FOR IP): Performed by: INTERNAL MEDICINE

## 2018-03-19 PROCEDURE — 0HDRXZZ EXTRACTION OF TOE NAIL, EXTERNAL APPROACH: ICD-10-PCS | Performed by: PODIATRIST

## 2018-03-19 PROCEDURE — 83735 ASSAY OF MAGNESIUM: CPT

## 2018-03-19 PROCEDURE — 2580000003 HC RX 258: Performed by: INTERNAL MEDICINE

## 2018-03-19 PROCEDURE — 80053 COMPREHEN METABOLIC PANEL: CPT

## 2018-03-19 PROCEDURE — 82962 GLUCOSE BLOOD TEST: CPT

## 2018-03-19 PROCEDURE — 3700000000 HC ANESTHESIA ATTENDED CARE: Performed by: PODIATRIST

## 2018-03-19 PROCEDURE — 0QBR0ZX EXCISION OF LEFT TOE PHALANX, OPEN APPROACH, DIAGNOSTIC: ICD-10-PCS | Performed by: PODIATRIST

## 2018-03-19 PROCEDURE — 7100000011 HC PHASE II RECOVERY - ADDTL 15 MIN: Performed by: PODIATRIST

## 2018-03-19 PROCEDURE — 3600000012 HC SURGERY LEVEL 2 ADDTL 15MIN: Performed by: PODIATRIST

## 2018-03-19 PROCEDURE — 84100 ASSAY OF PHOSPHORUS: CPT

## 2018-03-19 PROCEDURE — 6360000002 HC RX W HCPCS: Performed by: SPECIALIST

## 2018-03-19 PROCEDURE — 2500000003 HC RX 250 WO HCPCS: Performed by: PODIATRIST

## 2018-03-19 PROCEDURE — 3700000001 HC ADD 15 MINUTES (ANESTHESIA): Performed by: PODIATRIST

## 2018-03-19 PROCEDURE — 7100000010 HC PHASE II RECOVERY - FIRST 15 MIN: Performed by: PODIATRIST

## 2018-03-19 PROCEDURE — 85025 COMPLETE CBC W/AUTO DIFF WBC: CPT

## 2018-03-19 PROCEDURE — 88311 DECALCIFY TISSUE: CPT

## 2018-03-19 PROCEDURE — 6370000000 HC RX 637 (ALT 250 FOR IP): Performed by: CLINICAL NURSE SPECIALIST

## 2018-03-19 PROCEDURE — 88312 SPECIAL STAINS GROUP 1: CPT

## 2018-03-19 PROCEDURE — 1200000000 HC SEMI PRIVATE

## 2018-03-19 RX ORDER — DOXYCYCLINE HYCLATE 100 MG/1
100 CAPSULE ORAL EVERY 12 HOURS SCHEDULED
Status: DISCONTINUED | OUTPATIENT
Start: 2018-03-19 | End: 2018-03-21 | Stop reason: HOSPADM

## 2018-03-19 RX ORDER — DOXYCYCLINE HYCLATE 100 MG/1
100 CAPSULE ORAL 2 TIMES DAILY
Qty: 60 CAPSULE | Refills: 1 | Status: SHIPPED | OUTPATIENT
Start: 2018-03-19 | End: 2018-04-19

## 2018-03-19 RX ORDER — OXYCODONE HYDROCHLORIDE AND ACETAMINOPHEN 5; 325 MG/1; MG/1
2 TABLET ORAL
Status: ACTIVE | OUTPATIENT
Start: 2018-03-19 | End: 2018-03-19

## 2018-03-19 RX ORDER — LEVOFLOXACIN 500 MG/1
500 TABLET, FILM COATED ORAL DAILY
Status: DISCONTINUED | OUTPATIENT
Start: 2018-03-19 | End: 2018-03-21 | Stop reason: HOSPADM

## 2018-03-19 RX ORDER — ATENOLOL 25 MG/1
12.5 TABLET ORAL DAILY
Status: DISCONTINUED | OUTPATIENT
Start: 2018-03-20 | End: 2018-03-21 | Stop reason: HOSPADM

## 2018-03-19 RX ORDER — CLONIDINE HYDROCHLORIDE 0.1 MG/1
0.1 TABLET ORAL 3 TIMES DAILY
Status: DISCONTINUED | OUTPATIENT
Start: 2018-03-19 | End: 2018-03-21 | Stop reason: HOSPADM

## 2018-03-19 RX ORDER — FENTANYL CITRATE 50 UG/ML
INJECTION, SOLUTION INTRAMUSCULAR; INTRAVENOUS PRN
Status: DISCONTINUED | OUTPATIENT
Start: 2018-03-19 | End: 2018-03-19 | Stop reason: SDUPTHER

## 2018-03-19 RX ORDER — PROPOFOL 10 MG/ML
INJECTION, EMULSION INTRAVENOUS PRN
Status: DISCONTINUED | OUTPATIENT
Start: 2018-03-19 | End: 2018-03-19 | Stop reason: SDUPTHER

## 2018-03-19 RX ORDER — BUPIVACAINE HYDROCHLORIDE 5 MG/ML
INJECTION, SOLUTION EPIDURAL; INTRACAUDAL PRN
Status: DISCONTINUED | OUTPATIENT
Start: 2018-03-19 | End: 2018-03-21 | Stop reason: HOSPADM

## 2018-03-19 RX ORDER — SODIUM CHLORIDE, SODIUM LACTATE, POTASSIUM CHLORIDE, CALCIUM CHLORIDE 600; 310; 30; 20 MG/100ML; MG/100ML; MG/100ML; MG/100ML
INJECTION, SOLUTION INTRAVENOUS CONTINUOUS PRN
Status: DISCONTINUED | OUTPATIENT
Start: 2018-03-19 | End: 2018-03-19 | Stop reason: SDUPTHER

## 2018-03-19 RX ORDER — OXYCODONE HYDROCHLORIDE AND ACETAMINOPHEN 5; 325 MG/1; MG/1
1 TABLET ORAL
Status: ACTIVE | OUTPATIENT
Start: 2018-03-19 | End: 2018-03-19

## 2018-03-19 RX ORDER — LEVOFLOXACIN 500 MG/1
500 TABLET, FILM COATED ORAL DAILY
Qty: 21 TABLET | Refills: 0 | Status: SHIPPED | OUTPATIENT
Start: 2018-03-19 | End: 2018-04-09

## 2018-03-19 RX ADMIN — PROPOFOL 150 MG: 10 INJECTION, EMULSION INTRAVENOUS at 07:40

## 2018-03-19 RX ADMIN — FENTANYL CITRATE 25 MCG: 50 INJECTION, SOLUTION INTRAMUSCULAR; INTRAVENOUS at 07:45

## 2018-03-19 RX ADMIN — Medication 1 SPRAY: at 09:42

## 2018-03-19 RX ADMIN — LEVOFLOXACIN 500 MG: 500 TABLET, FILM COATED ORAL at 18:07

## 2018-03-19 RX ADMIN — ATORVASTATIN CALCIUM 80 MG: 40 TABLET, FILM COATED ORAL at 21:36

## 2018-03-19 RX ADMIN — HYDRALAZINE HYDROCHLORIDE 50 MG: 50 TABLET, FILM COATED ORAL at 05:57

## 2018-03-19 RX ADMIN — MULTIPLE VITAMINS W/ MINERALS TAB 1 TABLET: TAB at 09:41

## 2018-03-19 RX ADMIN — HYDRALAZINE HYDROCHLORIDE 50 MG: 50 TABLET, FILM COATED ORAL at 18:08

## 2018-03-19 RX ADMIN — SODIUM CHLORIDE, POTASSIUM CHLORIDE, SODIUM LACTATE AND CALCIUM CHLORIDE: 600; 310; 30; 20 INJECTION, SOLUTION INTRAVENOUS at 07:25

## 2018-03-19 RX ADMIN — SODIUM CHLORIDE 125 MG: 9 INJECTION, SOLUTION INTRAVENOUS at 09:42

## 2018-03-19 RX ADMIN — Medication 400 MG: at 09:42

## 2018-03-19 RX ADMIN — INSULIN LISPRO 4 UNITS: 100 INJECTION, SOLUTION INTRAVENOUS; SUBCUTANEOUS at 21:41

## 2018-03-19 RX ADMIN — PANTOPRAZOLE SODIUM 40 MG: 40 TABLET, DELAYED RELEASE ORAL at 18:08

## 2018-03-19 RX ADMIN — DOCUSATE SODIUM 100 MG: 100 CAPSULE, LIQUID FILLED ORAL at 09:42

## 2018-03-19 RX ADMIN — CLONIDINE HYDROCHLORIDE 0.1 MG: 0.1 TABLET ORAL at 21:36

## 2018-03-19 RX ADMIN — PANTOPRAZOLE SODIUM 40 MG: 40 TABLET, DELAYED RELEASE ORAL at 05:57

## 2018-03-19 RX ADMIN — ERGOCALCIFEROL 50000 UNITS: 1.25 CAPSULE ORAL at 09:41

## 2018-03-19 RX ADMIN — INSULIN GLARGINE 10 UNITS: 100 INJECTION, SOLUTION SUBCUTANEOUS at 21:41

## 2018-03-19 RX ADMIN — CLONIDINE HYDROCHLORIDE 0.1 MG: 0.1 TABLET ORAL at 09:42

## 2018-03-19 RX ADMIN — Medication 10 ML: at 09:42

## 2018-03-19 RX ADMIN — AMLODIPINE BESYLATE 10 MG: 10 TABLET ORAL at 21:36

## 2018-03-19 RX ADMIN — VANCOMYCIN HYDROCHLORIDE 1.5 G: 10 INJECTION, POWDER, LYOPHILIZED, FOR SOLUTION INTRAVENOUS at 07:55

## 2018-03-19 RX ADMIN — DOXYCYCLINE HYCLATE 100 MG: 100 CAPSULE, GELATIN COATED ORAL at 21:36

## 2018-03-19 RX ADMIN — CALCITRIOL 0.25 MCG: 0.25 CAPSULE, LIQUID FILLED ORAL at 21:35

## 2018-03-19 RX ADMIN — DONEPEZIL HYDROCHLORIDE 5 MG: 5 TABLET, FILM COATED ORAL at 21:35

## 2018-03-19 RX ADMIN — MIRTAZAPINE 7.5 MG: 15 TABLET, FILM COATED ORAL at 21:35

## 2018-03-19 RX ADMIN — HYDRALAZINE HYDROCHLORIDE 50 MG: 50 TABLET, FILM COATED ORAL at 00:03

## 2018-03-19 RX ADMIN — Medication 30 ML: at 10:28

## 2018-03-19 RX ADMIN — Medication 10 ML: at 21:36

## 2018-03-19 RX ADMIN — HYDRALAZINE HYDROCHLORIDE 50 MG: 50 TABLET, FILM COATED ORAL at 12:09

## 2018-03-19 ASSESSMENT — PULMONARY FUNCTION TESTS
PIF_VALUE: 0
PIF_VALUE: 1
PIF_VALUE: 0
PIF_VALUE: 1
PIF_VALUE: 0

## 2018-03-19 ASSESSMENT — PAIN - FUNCTIONAL ASSESSMENT: PAIN_FUNCTIONAL_ASSESSMENT: 0-10

## 2018-03-19 ASSESSMENT — PAIN SCALES - GENERAL
PAINLEVEL_OUTOF10: 0

## 2018-03-19 ASSESSMENT — LIFESTYLE VARIABLES: SMOKING_STATUS: 1

## 2018-03-19 ASSESSMENT — PAIN DESCRIPTION - PAIN TYPE: TYPE: SURGICAL PAIN

## 2018-03-19 NOTE — PROGRESS NOTES
Infectious Disease  Progress Note  [facility]    Chief Complaint: LEFT IST TOE EXPOSED BONE    Subjective: Patient is awake alert no acute distress. There's been no fevers chills or night sweats. He's been no drainage from his eyes ears nose and throat no nausea or vomiting. Patient has no diarrhea, no frequency burning no hemoptysis hematemesis or hematochezia. No focal motor sensory loss psychiatric problems. Negative for new endocrine issues and no blood dyscrasias. Patient denies any cough or shortness of breath no palpitations no rash.     Scheduled Meds:   doxycycline hyclate  100 mg Oral 2 times per day    levofloxacin  500 mg Oral Daily    hydrALAZINE  50 mg Oral 4 times per day    pantoprazole  40 mg Oral BID AC    amLODIPine  10 mg Oral Nightly    docusate sodium  100 mg Oral Daily    ferric gluconate (FERRLECIT) IVPB  125 mg Intravenous Daily    calcitRIOL  0.25 mcg Oral Q MWF    atenolol  25 mg Oral Daily    vitamin D  50,000 Units Oral Weekly    atorvastatin  80 mg Oral Nightly    cloNIDine  0.1 mg Oral Daily    donepezil  5 mg Oral Nightly    fluticasone  1 spray Nasal BID    insulin glargine  10 Units Subcutaneous Nightly    insulin lispro  2-10 Units Subcutaneous 4x Daily AC & HS    magnesium oxide  400 mg Oral Daily    mirtazapine  7.5 mg Oral Nightly    therapeutic multivitamin-minerals  1 tablet Oral Daily    mupirocin   Topical Daily    collagenase   Topical Daily    sodium chloride flush  10 mL Intravenous Q12H    sodium chloride flush  10 mL Intravenous 2 times per day     Continuous Infusions:   dextrose       PRN Meds:oxyCODONE-acetaminophen **OR** oxyCODONE-acetaminophen, bupivacaine (PF), ammonium lactate, guaiFENesin-dextromethorphan, ondansetron, glucose, dextrose, glucagon (rDNA), dextrose, sodium chloride flush, acetaminophen    Patient Vitals for the past 24 hrs:   BP Temp Temp src Pulse Resp SpO2 Weight   03/19/18 0914 (!) 164/82 98.4 °F (36.9 °C) Oral 55 18 98 % - normal...

## 2018-03-19 NOTE — PROGRESS NOTES
Nutrition Assessment    Type and Reason for Visit: Reassess    Nutrition Recommendations: Once NPO has been d/c, Continue diet per GI and Ensure Low Doe, High Pro BID. Malnutrition Assessment:  · Malnutrition Status: Meets the criteria for moderate malnutrition  · Context: Acute illness or injury  · Findings of the 6 clinical characteristics of malnutrition (Minimum of 2 out of 6 clinical characteristics is required to make the diagnosis of moderate or severe Protein Calorie Malnutrition based on AND/ASPEN Guidelines):  1. Energy Intake-Greater than 75%,  (x 3 days)    2. Weight Loss-No significant weight loss    3. Fat Loss-Mild subcutaneous fat loss, Orbital, Triceps  4. Muscle Loss-Mild muscle mass loss, Temples (temporalis muscle), Clavicles (pectoralis and deltoids)  5. Fluid Accumulation-No significant fluid accumulation    6.  Strength-Not measured    Nutrition Diagnosis:   · Problem: Moderate malnutrition, in context of acute illness or injury  · Etiology: related to Alteration in GI function     Signs and symptoms:  as evidenced by NPO status due to medical condition, Diet history of poor intake, Presence of wounds, BMI, Localized or generalized fluid accumulation (mild muscle loss, mild fat loss)    Nutrition Assessment:  · Subjective Assessment: Pt w/ continued good bhaskar/intake. NPO for upper GI SBFT today.   · Nutrition-Focused Physical Findings: A&O, + teeth, abd WDL, +BS, trace BLE edema, -I/O's  · Wound Type: Multiple, Pressure Ulcer, Unstageable, Surgical Wound  · Current Nutrition Therapies:  · Oral Diet Orders: NPO   · Oral Diet intake: % (prior to NPO)  · Oral Nutrition Supplement (ONS) Orders: None  · ONS intake: Unable to assess  · Anthropometric Measures:  · Ht: 6' (182.9 cm)   · Current Body Wt: 154 lb (69.9 kg) (bed 3/19)  · Admission Body Wt: 153 lb (69.4 kg) (3/7 act, bed)  · Usual Body Wt: 139 lb (63 kg) (09/2016 per EMR)  · % Weight Change: no wt loss noted since adm,

## 2018-03-19 NOTE — PLAN OF CARE
Problem: Nutrition  Goal: Optimal nutrition therapy  Outcome: Ongoing  Nutrition Problem:  Moderate malnutrition, in context of acute illness or injury  Intervention: Food and/or Nutrient Delivery: Continue current diet, Continue current ONS (Once NPO has been d/c, Continue diet per GI and Ensure Low Doe, High Pro BID.  )  Nutritional Goals: PO intake 75% of meals/ONS

## 2018-03-19 NOTE — PROGRESS NOTES
Physical Therapy    Pt NA this AM, off the floor recovering from a procedure. Will follow as able.     Keily Mccollum PTA [id number]

## 2018-03-19 NOTE — PROGRESS NOTES
PROGRESS NOTE    Patient Presents with/Seen in Consultation For      *Upper GI bleed--big drop in Hgb, FOBT positive in ER  CHIEF COMPLAINT:  Low H/H on blood work at [facility]       Subjective:     Patient seen sitting up in bed, in no acute distress. Just returned from surgery. Continues to deny abdominal pain. Remains NPO for upper GI SBFT--would like to eat. Denies nausea or emesis. Reports moving his bowels--denies melena or hematochezia. Review of Systems  Aside from what was mentioned in the PMH and HPI, essentially unremarkable, all others negative. Objective:     Vitals: /64   Pulse 59   Temp 97.9 °F (36.6 °C)   Resp 20   Ht 6' (1.829 m)   Wt 154 lb (69.9 kg)   SpO2 100%   BMI 20.89 kg/m²     General appearance: alert, awake, lying in bed, and cooperative  Eyes: conjunctivae/corneas clear. PERRL.   Lungs: rhonchi scattered throughout lung fields  Heart: regular rate and rhythm, no murmur, 2+ pulses;  without edema   Abdomen: soft, non-tender; bowel sounds normal; no masses,  no organomegaly; suprapubic cath intact with yellow colored urine   Extremities:  No edema, cyanosis or clubbing; dressings dry and intact to bilateral great toes   Pulses: 2+ and symmetric  Skin: AA, skin color, texture, turgor normal.   Neurologic: Grossly normal      oxyCODONE-acetaminophen (PERCOCET) 5-325 MG per tablet 1 tablet Once PRN   Or    oxyCODONE-acetaminophen (PERCOCET) 5-325 MG per tablet 2 tablet Once PRN   bupivacaine (PF) (MARCAINE) 0.5 % injection PRN   hydrALAZINE (APRESOLINE) tablet 50 mg 4 times per day   pantoprazole (PROTONIX) tablet 40 mg BID AC   vancomycin 1.5 g in dextrose 5% 300 mL IVPB See Admin Instructions   amLODIPine (NORVASC) tablet 10 mg Nightly   docusate sodium (COLACE) capsule 100 mg Daily   ferric gluconate (FERRLECIT) 125 mg in sodium chloride 0.9 % 100 mL IVPB Daily   calcitRIOL (ROCALTROL) capsule 0.25 mcg Q MWF   atenolol (TENORMIN) tablet 25 mg Daily   vitamin D

## 2018-03-19 NOTE — BRIEF OP NOTE
Brief Postoperative Note    Manuel Renae. YOB: 1950  [id number]    Pre-operative Diagnosis: Infection right 1st digit. Osteomyelitis left 1st digit. Diabetic ulcers b/l 1st digits. Post-operative Diagnosis: Same    Procedure: Total Nail Avulsion right 1st digit.   Resection of bone left 1st digit    Anesthesia: MAC    Surgeons/Assistants: Dr Jan Potter    Estimated Blood Loss: less than 50     Complications: None    Specimens: Was Obtained: bone     Findings: devitalized bone and tissue    Electronically signed by Robert Rothman DPM on 3/19/2018 at 7:37 AM

## 2018-03-20 ENCOUNTER — APPOINTMENT (OUTPATIENT)
Dept: GENERAL RADIOLOGY | Age: 68
DRG: 244 | End: 2018-03-20
Payer: MEDICAID

## 2018-03-20 LAB
ALBUMIN SERPL-MCNC: 2.9 G/DL (ref 3.5–5.2)
ALP BLD-CCNC: 88 U/L (ref 40–129)
ALT SERPL-CCNC: 11 U/L (ref 0–40)
ANION GAP SERPL CALCULATED.3IONS-SCNC: 12 MMOL/L (ref 7–16)
AST SERPL-CCNC: 15 U/L (ref 0–39)
BASOPHILS ABSOLUTE: 0.03 E9/L (ref 0–0.2)
BASOPHILS RELATIVE PERCENT: 0.6 % (ref 0–2)
BILIRUB SERPL-MCNC: 0.4 MG/DL (ref 0–1.2)
BUN BLDV-MCNC: 30 MG/DL (ref 8–23)
CALCIUM SERPL-MCNC: 9 MG/DL (ref 8.6–10.2)
CHLORIDE BLD-SCNC: 102 MMOL/L (ref 98–107)
CO2: 27 MMOL/L (ref 22–29)
CREAT SERPL-MCNC: 2 MG/DL (ref 0.7–1.2)
EOSINOPHILS ABSOLUTE: 0.79 E9/L (ref 0.05–0.5)
EOSINOPHILS RELATIVE PERCENT: 16.8 % (ref 0–6)
GFR AFRICAN AMERICAN: 41
GFR NON-AFRICAN AMERICAN: 41 ML/MIN/1.73
GLUCOSE BLD-MCNC: 157 MG/DL (ref 74–109)
GRAM STAIN ORDERABLE: NORMAL
HCT VFR BLD CALC: 35.4 % (ref 37–54)
HEMOGLOBIN: 11 G/DL (ref 12.5–16.5)
IMMATURE GRANULOCYTES #: 0.02 E9/L
IMMATURE GRANULOCYTES %: 0.4 % (ref 0–5)
LYMPHOCYTES ABSOLUTE: 1.66 E9/L (ref 1.5–4)
LYMPHOCYTES RELATIVE PERCENT: 35.2 % (ref 20–42)
MCH RBC QN AUTO: 29.6 PG (ref 26–35)
MCHC RBC AUTO-ENTMCNC: 31.1 % (ref 32–34.5)
MCV RBC AUTO: 95.4 FL (ref 80–99.9)
METER GLUCOSE: 206 MG/DL (ref 70–110)
METER GLUCOSE: 228 MG/DL (ref 70–110)
METER GLUCOSE: 240 MG/DL (ref 70–110)
METER GLUCOSE: 92 MG/DL (ref 70–110)
MONOCYTES ABSOLUTE: 0.27 E9/L (ref 0.1–0.95)
MONOCYTES RELATIVE PERCENT: 5.7 % (ref 2–12)
NEUTROPHILS ABSOLUTE: 1.94 E9/L (ref 1.8–7.3)
NEUTROPHILS RELATIVE PERCENT: 41.3 % (ref 43–80)
PDW BLD-RTO: 17 FL (ref 11.5–15)
PLATELET # BLD: 397 E9/L (ref 130–450)
PMV BLD AUTO: 8.1 FL (ref 7–12)
POTASSIUM SERPL-SCNC: 4.5 MMOL/L (ref 3.5–5)
RBC # BLD: 3.71 E12/L (ref 3.8–5.8)
SODIUM BLD-SCNC: 141 MMOL/L (ref 132–146)
TOTAL PROTEIN: 6.8 G/DL (ref 6.4–8.3)
WBC # BLD: 4.7 E9/L (ref 4.5–11.5)

## 2018-03-20 PROCEDURE — 36415 COLL VENOUS BLD VENIPUNCTURE: CPT

## 2018-03-20 PROCEDURE — 6370000000 HC RX 637 (ALT 250 FOR IP): Performed by: INTERNAL MEDICINE

## 2018-03-20 PROCEDURE — 80053 COMPREHEN METABOLIC PANEL: CPT

## 2018-03-20 PROCEDURE — 6370000000 HC RX 637 (ALT 250 FOR IP): Performed by: SPECIALIST

## 2018-03-20 PROCEDURE — 1200000000 HC SEMI PRIVATE

## 2018-03-20 PROCEDURE — 6360000002 HC RX W HCPCS: Performed by: INTERNAL MEDICINE

## 2018-03-20 PROCEDURE — 2580000003 HC RX 258: Performed by: INTERNAL MEDICINE

## 2018-03-20 PROCEDURE — 85025 COMPLETE CBC W/AUTO DIFF WBC: CPT

## 2018-03-20 PROCEDURE — 82962 GLUCOSE BLOOD TEST: CPT

## 2018-03-20 PROCEDURE — 6370000000 HC RX 637 (ALT 250 FOR IP): Performed by: CLINICAL NURSE SPECIALIST

## 2018-03-20 PROCEDURE — 6370000000 HC RX 637 (ALT 250 FOR IP): Performed by: PODIATRIST

## 2018-03-20 PROCEDURE — 74018 RADEX ABDOMEN 1 VIEW: CPT

## 2018-03-20 PROCEDURE — 6370000000 HC RX 637 (ALT 250 FOR IP): Performed by: NURSE PRACTITIONER

## 2018-03-20 PROCEDURE — 2580000003 HC RX 258: Performed by: NURSE PRACTITIONER

## 2018-03-20 RX ORDER — BISACODYL 10 MG
10 SUPPOSITORY, RECTAL RECTAL ONCE
Status: COMPLETED | OUTPATIENT
Start: 2018-03-20 | End: 2018-03-20

## 2018-03-20 RX ADMIN — HYDRALAZINE HYDROCHLORIDE 50 MG: 50 TABLET, FILM COATED ORAL at 16:51

## 2018-03-20 RX ADMIN — PANTOPRAZOLE SODIUM 40 MG: 40 TABLET, DELAYED RELEASE ORAL at 06:50

## 2018-03-20 RX ADMIN — DONEPEZIL HYDROCHLORIDE 5 MG: 5 TABLET, FILM COATED ORAL at 21:20

## 2018-03-20 RX ADMIN — DOXYCYCLINE HYCLATE 100 MG: 100 CAPSULE, GELATIN COATED ORAL at 09:50

## 2018-03-20 RX ADMIN — MAGNESIUM HYDROXIDE 30 ML: 400 SUSPENSION ORAL at 13:57

## 2018-03-20 RX ADMIN — SODIUM CHLORIDE 125 MG: 9 INJECTION, SOLUTION INTRAVENOUS at 11:29

## 2018-03-20 RX ADMIN — ATENOLOL 12.5 MG: 25 TABLET ORAL at 09:50

## 2018-03-20 RX ADMIN — CLONIDINE HYDROCHLORIDE 0.1 MG: 0.1 TABLET ORAL at 21:20

## 2018-03-20 RX ADMIN — MIRTAZAPINE 7.5 MG: 15 TABLET, FILM COATED ORAL at 21:20

## 2018-03-20 RX ADMIN — CLONIDINE HYDROCHLORIDE 0.1 MG: 0.1 TABLET ORAL at 13:57

## 2018-03-20 RX ADMIN — HYDRALAZINE HYDROCHLORIDE 50 MG: 50 TABLET, FILM COATED ORAL at 00:41

## 2018-03-20 RX ADMIN — Medication 10 ML: at 00:41

## 2018-03-20 RX ADMIN — HYDRALAZINE HYDROCHLORIDE 50 MG: 50 TABLET, FILM COATED ORAL at 06:50

## 2018-03-20 RX ADMIN — INSULIN GLARGINE 10 UNITS: 100 INJECTION, SOLUTION SUBCUTANEOUS at 21:24

## 2018-03-20 RX ADMIN — ATORVASTATIN CALCIUM 80 MG: 40 TABLET, FILM COATED ORAL at 21:20

## 2018-03-20 RX ADMIN — Medication 1 SPRAY: at 00:41

## 2018-03-20 RX ADMIN — BISACODYL 10 MG: 10 SUPPOSITORY RECTAL at 16:52

## 2018-03-20 RX ADMIN — DOXYCYCLINE HYCLATE 100 MG: 100 CAPSULE, GELATIN COATED ORAL at 21:20

## 2018-03-20 RX ADMIN — INSULIN LISPRO 4 UNITS: 100 INJECTION, SOLUTION INTRAVENOUS; SUBCUTANEOUS at 21:24

## 2018-03-20 RX ADMIN — Medication 400 MG: at 11:29

## 2018-03-20 RX ADMIN — MULTIPLE VITAMINS W/ MINERALS TAB 1 TABLET: TAB at 09:49

## 2018-03-20 RX ADMIN — Medication 10 ML: at 09:52

## 2018-03-20 RX ADMIN — HYDRALAZINE HYDROCHLORIDE 50 MG: 50 TABLET, FILM COATED ORAL at 13:00

## 2018-03-20 RX ADMIN — INSULIN LISPRO 4 UNITS: 100 INJECTION, SOLUTION INTRAVENOUS; SUBCUTANEOUS at 11:37

## 2018-03-20 RX ADMIN — LEVOFLOXACIN 500 MG: 500 TABLET, FILM COATED ORAL at 09:50

## 2018-03-20 RX ADMIN — INSULIN LISPRO 4 UNITS: 100 INJECTION, SOLUTION INTRAVENOUS; SUBCUTANEOUS at 16:52

## 2018-03-20 RX ADMIN — Medication 10 ML: at 21:23

## 2018-03-20 RX ADMIN — MUPIROCIN: 20 OINTMENT TOPICAL at 09:51

## 2018-03-20 RX ADMIN — Medication 1 SPRAY: at 21:21

## 2018-03-20 RX ADMIN — DOCUSATE SODIUM 100 MG: 100 CAPSULE, LIQUID FILLED ORAL at 09:50

## 2018-03-20 RX ADMIN — PANTOPRAZOLE SODIUM 40 MG: 40 TABLET, DELAYED RELEASE ORAL at 16:52

## 2018-03-20 RX ADMIN — CLONIDINE HYDROCHLORIDE 0.1 MG: 0.1 TABLET ORAL at 09:49

## 2018-03-20 RX ADMIN — AMLODIPINE BESYLATE 10 MG: 10 TABLET ORAL at 21:20

## 2018-03-20 ASSESSMENT — PAIN SCALES - GENERAL
PAINLEVEL_OUTOF10: 0

## 2018-03-20 NOTE — PROGRESS NOTES
Infectious Disease  Progress Note  [facility]    Chief Complaint: LEFT IST TOE EXPOSED BONE    Subjective: Patient is awake alert no acute distress. There's been no fevers chills or night sweats. He's been no drainage from his eyes ears nose and throat no nausea or vomiting. Patient has no diarrhea, no frequency burning no hemoptysis hematemesis or hematochezia. No focal motor sensory loss psychiatric problems. Negative for new endocrine issues and no blood dyscrasias. Patient denies any cough or shortness of breath no palpitations no rash.     Scheduled Meds:   doxycycline hyclate  100 mg Oral 2 times per day    levofloxacin  500 mg Oral Daily    cloNIDine  0.1 mg Oral TID    atenolol  12.5 mg Oral Daily    mupirocin   Topical Daily    hydrALAZINE  50 mg Oral 4 times per day    pantoprazole  40 mg Oral BID AC    amLODIPine  10 mg Oral Nightly    docusate sodium  100 mg Oral Daily    ferric gluconate (FERRLECIT) IVPB  125 mg Intravenous Daily    calcitRIOL  0.25 mcg Oral Q MWF    vitamin D  50,000 Units Oral Weekly    atorvastatin  80 mg Oral Nightly    donepezil  5 mg Oral Nightly    fluticasone  1 spray Nasal BID    insulin glargine  10 Units Subcutaneous Nightly    insulin lispro  2-10 Units Subcutaneous 4x Daily AC & HS    magnesium oxide  400 mg Oral Daily    mirtazapine  7.5 mg Oral Nightly    therapeutic multivitamin-minerals  1 tablet Oral Daily    collagenase   Topical Daily    sodium chloride flush  10 mL Intravenous Q12H    sodium chloride flush  10 mL Intravenous 2 times per day     Continuous Infusions:   dextrose       PRN Meds:bupivacaine (PF), ammonium lactate, guaiFENesin-dextromethorphan, ondansetron, glucose, dextrose, glucagon (rDNA), dextrose, sodium chloride flush, acetaminophen    Patient Vitals for the past 24 hrs:   BP Temp Temp src Pulse Resp SpO2 Weight   03/20/18 0945 (!) 140/67 98.5 °F (36.9 °C) Oral 59 16 98 % -   03/20/18 0423 - - - - - - 155 lb (70.3 kg)   03/20/18 & SMALL BOWEL   Final Result   Delayed bowel transit concerning for obstruction. The point of   obstruction may be in the distal ileum or cecum. Continued serial   radiography with portable abdominal radiographs is recommended to   asses for progression of contrast through the large bowel. XR CHEST STANDARD (2 VW)   Final Result      NM GI BLOOD LOSS   Final Result      No findings to suggest upper lower gastrointestinal bleed. US RETROPERITONEAL COMPLETE   Final Result      1. Findings are suggestive of nonobstructing right renal calculus   measuring approximately 6 mm. 2. No evidence of hydronephrosis. 3. Minimal areas of hypoechogenicity are seen adjacent to both kidneys   which could suggest minimal bilateral perinephric fluid collections or   nonspecific perinephric edema. VL Ankle Art Brachial Indices Extremity Bilateral   Final Result   Abnormal bilateral ankle-brachial indices, consistent with   severe arterial disease in the bilateral lower extremities.                XR FOOT LEFT (MIN 3 VIEWS)   Final Result      XR ABDOMEN (KUB) (SINGLE AP VIEW)    (Results Pending)       Assessment  ISCHEMIC LESION TO LOWER EXT C EXPOSED BONE LEFT 1ST TOE  MIX ORG WOUND INFECTION  LEUKOPENIA    Plan     D/W PODIATRY      WILL CONTINUE C LEVAQUIN AND DOXY PO      Electronically signed by Jose Masterson MD on 3/20/2018 at 11:02 AM

## 2018-03-20 NOTE — PROGRESS NOTES
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 No rashes or lesions. Neck:  supple, no JVD  Lungs:  Normal expansion. Clear to auscultation. No rales, rhonchi, or wheezing. Heart:  Heart regular rate and rhythm     Abdominal: Abdomen soft, non-tender.  BS normal. No masses,  No organomegaly  Extremities: no edema  Psych: cooperative and appropriate      DATA:    CBC with Differential:    Lab Results   Component Value Date    WBC 4.7 03/20/2018    RBC 3.71 03/20/2018    HGB 11.0 03/20/2018    HCT 35.4 03/20/2018     03/20/2018    MCV 95.4 03/20/2018    MCH 29.6 03/20/2018    MCHC 31.1 03/20/2018    RDW 17.0 03/20/2018    NRBC 0.0 03/18/2018    SEGSPCT 49 09/12/2013    LYMPHOPCT 35.2 03/20/2018    MONOPCT 5.7 03/20/2018    BASOPCT 0.6 03/20/2018    MONOSABS 0.27 03/20/2018    LYMPHSABS 1.66 03/20/2018    EOSABS 0.79 03/20/2018    BASOSABS 0.03 03/20/2018     BMP:    Lab Results   Component Value Date     03/20/2018    K 4.5 03/20/2018    K 5.7 03/08/2018     03/20/2018    CO2 27 03/20/2018    BUN 30 03/20/2018    LABALBU 2.9 03/20/2018    LABALBU 4.2 05/17/2012    CREATININE 2.0 03/20/2018    CALCIUM 9.0 03/20/2018    GFRAA 41 03/20/2018    LABGLOM 41 03/20/2018    GLUCOSE 157 03/20/2018    GLUCOSE 242 05/17/2012          bisacodyl  10 mg Rectal Once    doxycycline hyclate  100 mg Oral 2 times per day    levofloxacin  500 mg Oral Daily    cloNIDine  0.1 mg Oral TID    atenolol  12.5 mg Oral Daily    mupirocin   Topical Daily    hydrALAZINE  50 mg Oral 4 times per day    pantoprazole  40 mg Oral BID AC    amLODIPine  10 mg Oral transfuse as needed      Hx of obstructive uropathy, s/p suprapubic catheter-no obstruction noted on US    Secondary hyperparathyroidism -adequate  Vit D 39 -hypocalcemia in the setting of hypoalbuminemia- ionized Ca++ WNL and PO4 WNL    HTN with CKD I-IV: blood pressure acceptable currently; follow        Daljit Hough MD  3/20/2018 4:28 PM

## 2018-03-20 NOTE — PROGRESS NOTES
Physical Therapy    Pt NA for Rx. Awaiting off loading cast shoe to promote NWB L forefoot.  Nurse informed in AM, Charge Nurse informed in PM.     Luci Butler PTA [id number]

## 2018-03-21 VITALS
TEMPERATURE: 97.7 F | BODY MASS INDEX: 20.99 KG/M2 | DIASTOLIC BLOOD PRESSURE: 57 MMHG | RESPIRATION RATE: 16 BRPM | SYSTOLIC BLOOD PRESSURE: 118 MMHG | OXYGEN SATURATION: 98 % | HEIGHT: 72 IN | HEART RATE: 55 BPM | WEIGHT: 155 LBS

## 2018-03-21 LAB
ALBUMIN SERPL-MCNC: 2.9 G/DL (ref 3.5–5.2)
ALP BLD-CCNC: 82 U/L (ref 40–129)
ALT SERPL-CCNC: 11 U/L (ref 0–40)
ANION GAP SERPL CALCULATED.3IONS-SCNC: -5 MMOL/L (ref 7–16)
AST SERPL-CCNC: 16 U/L (ref 0–39)
BASOPHILS ABSOLUTE: 0.01 E9/L (ref 0–0.2)
BASOPHILS RELATIVE PERCENT: 0.2 % (ref 0–2)
BILIRUB SERPL-MCNC: <0.2 MG/DL (ref 0–1.2)
BUN BLDV-MCNC: 40 MG/DL (ref 8–23)
CALCIUM SERPL-MCNC: 8.6 MG/DL (ref 8.6–10.2)
CHLORIDE BLD-SCNC: 108 MMOL/L (ref 98–107)
CO2: 27 MMOL/L (ref 22–29)
CREAT SERPL-MCNC: 2.2 MG/DL (ref 0.7–1.2)
EOSINOPHILS ABSOLUTE: 0.93 E9/L (ref 0.05–0.5)
EOSINOPHILS RELATIVE PERCENT: 18 % (ref 0–6)
GFR AFRICAN AMERICAN: 36
GFR NON-AFRICAN AMERICAN: 36 ML/MIN/1.73
GLUCOSE BLD-MCNC: 169 MG/DL (ref 74–109)
HCT VFR BLD CALC: 31.8 % (ref 37–54)
HEMOGLOBIN: 9.9 G/DL (ref 12.5–16.5)
IMMATURE GRANULOCYTES #: 0.03 E9/L
IMMATURE GRANULOCYTES %: 0.6 % (ref 0–5)
LYMPHOCYTES ABSOLUTE: 1.73 E9/L (ref 1.5–4)
LYMPHOCYTES RELATIVE PERCENT: 33.5 % (ref 20–42)
MCH RBC QN AUTO: 30 PG (ref 26–35)
MCHC RBC AUTO-ENTMCNC: 31.1 % (ref 32–34.5)
MCV RBC AUTO: 96.4 FL (ref 80–99.9)
METER GLUCOSE: 166 MG/DL (ref 70–110)
METER GLUCOSE: 244 MG/DL (ref 70–110)
METER GLUCOSE: 383 MG/DL (ref 70–110)
MONOCYTES ABSOLUTE: 0.2 E9/L (ref 0.1–0.95)
MONOCYTES RELATIVE PERCENT: 3.9 % (ref 2–12)
NEUTROPHILS ABSOLUTE: 2.27 E9/L (ref 1.8–7.3)
NEUTROPHILS RELATIVE PERCENT: 43.8 % (ref 43–80)
PDW BLD-RTO: 17.1 FL (ref 11.5–15)
PLATELET # BLD: 394 E9/L (ref 130–450)
PMV BLD AUTO: 8.4 FL (ref 7–12)
POTASSIUM SERPL-SCNC: 4 MMOL/L (ref 3.5–5)
RBC # BLD: 3.3 E12/L (ref 3.8–5.8)
SODIUM BLD-SCNC: 130 MMOL/L (ref 132–146)
TOTAL PROTEIN: 6.2 G/DL (ref 6.4–8.3)
WBC # BLD: 5.2 E9/L (ref 4.5–11.5)
WOUND/ABSCESS: NORMAL

## 2018-03-21 PROCEDURE — 36415 COLL VENOUS BLD VENIPUNCTURE: CPT

## 2018-03-21 PROCEDURE — 6370000000 HC RX 637 (ALT 250 FOR IP): Performed by: INTERNAL MEDICINE

## 2018-03-21 PROCEDURE — 2580000003 HC RX 258: Performed by: INTERNAL MEDICINE

## 2018-03-21 PROCEDURE — 2580000003 HC RX 258: Performed by: NURSE PRACTITIONER

## 2018-03-21 PROCEDURE — 6370000000 HC RX 637 (ALT 250 FOR IP): Performed by: SPECIALIST

## 2018-03-21 PROCEDURE — 6360000002 HC RX W HCPCS: Performed by: INTERNAL MEDICINE

## 2018-03-21 PROCEDURE — 80053 COMPREHEN METABOLIC PANEL: CPT

## 2018-03-21 PROCEDURE — 6370000000 HC RX 637 (ALT 250 FOR IP): Performed by: NURSE PRACTITIONER

## 2018-03-21 PROCEDURE — 6370000000 HC RX 637 (ALT 250 FOR IP): Performed by: CLINICAL NURSE SPECIALIST

## 2018-03-21 PROCEDURE — 82962 GLUCOSE BLOOD TEST: CPT

## 2018-03-21 PROCEDURE — 85025 COMPLETE CBC W/AUTO DIFF WBC: CPT

## 2018-03-21 RX ORDER — CLONIDINE HYDROCHLORIDE 0.1 MG/1
0.1 TABLET ORAL 3 TIMES DAILY
Qty: 60 TABLET | Refills: 3 | DISCHARGE
Start: 2018-03-21

## 2018-03-21 RX ORDER — ATENOLOL 25 MG/1
12.5 TABLET ORAL DAILY
Qty: 30 TABLET | Refills: 3 | DISCHARGE
Start: 2018-03-22

## 2018-03-21 RX ORDER — FERROUS SULFATE TAB EC 324 MG (65 MG FE EQUIVALENT) 324 (65 FE) MG
324 TABLET DELAYED RESPONSE ORAL
Qty: 30 TABLET | Status: ON HOLD | DISCHARGE
Start: 2018-03-21 | End: 2020-01-01 | Stop reason: HOSPADM

## 2018-03-21 RX ORDER — PANTOPRAZOLE SODIUM 40 MG/1
40 TABLET, DELAYED RELEASE ORAL
Qty: 30 TABLET | Refills: 0 | DISCHARGE
Start: 2018-03-21

## 2018-03-21 RX ORDER — PSEUDOEPHEDRINE HCL 30 MG
100 TABLET ORAL DAILY
Status: ON HOLD | DISCHARGE
Start: 2018-03-22 | End: 2020-01-01 | Stop reason: HOSPADM

## 2018-03-21 RX ORDER — CALCITRIOL 0.25 UG/1
0.25 CAPSULE, LIQUID FILLED ORAL EVERY OTHER DAY
Qty: 30 CAPSULE | Refills: 3 | Status: ON HOLD | DISCHARGE
Start: 2018-03-21 | End: 2020-01-01 | Stop reason: HOSPADM

## 2018-03-21 RX ORDER — HYDRALAZINE HYDROCHLORIDE 50 MG/1
50 TABLET, FILM COATED ORAL 4 TIMES DAILY
Qty: 90 TABLET | Refills: 3 | DISCHARGE
Start: 2018-03-21 | End: 2018-08-29

## 2018-03-21 RX ORDER — AMLODIPINE BESYLATE 10 MG/1
10 TABLET ORAL NIGHTLY
Qty: 30 TABLET | Refills: 3 | DISCHARGE
Start: 2018-03-21

## 2018-03-21 RX ADMIN — HYDRALAZINE HYDROCHLORIDE 50 MG: 50 TABLET, FILM COATED ORAL at 11:37

## 2018-03-21 RX ADMIN — PANTOPRAZOLE SODIUM 40 MG: 40 TABLET, DELAYED RELEASE ORAL at 08:44

## 2018-03-21 RX ADMIN — INSULIN LISPRO 10 UNITS: 100 INJECTION, SOLUTION INTRAVENOUS; SUBCUTANEOUS at 11:34

## 2018-03-21 RX ADMIN — MUPIROCIN: 20 OINTMENT TOPICAL at 08:47

## 2018-03-21 RX ADMIN — Medication 1 SPRAY: at 08:47

## 2018-03-21 RX ADMIN — MULTIPLE VITAMINS W/ MINERALS TAB 1 TABLET: TAB at 08:44

## 2018-03-21 RX ADMIN — LEVOFLOXACIN 500 MG: 500 TABLET, FILM COATED ORAL at 08:44

## 2018-03-21 RX ADMIN — Medication 400 MG: at 08:44

## 2018-03-21 RX ADMIN — SODIUM CHLORIDE 125 MG: 9 INJECTION, SOLUTION INTRAVENOUS at 08:53

## 2018-03-21 RX ADMIN — DOCUSATE SODIUM 100 MG: 100 CAPSULE, LIQUID FILLED ORAL at 08:44

## 2018-03-21 RX ADMIN — Medication 10 ML: at 08:54

## 2018-03-21 RX ADMIN — DOXYCYCLINE HYCLATE 100 MG: 100 CAPSULE, GELATIN COATED ORAL at 08:45

## 2018-03-21 RX ADMIN — Medication 10 ML: at 08:47

## 2018-03-21 RX ADMIN — HYDRALAZINE HYDROCHLORIDE 50 MG: 50 TABLET, FILM COATED ORAL at 06:59

## 2018-03-21 RX ADMIN — CLONIDINE HYDROCHLORIDE 0.1 MG: 0.1 TABLET ORAL at 08:44

## 2018-03-21 RX ADMIN — INSULIN LISPRO 2 UNITS: 100 INJECTION, SOLUTION INTRAVENOUS; SUBCUTANEOUS at 07:00

## 2018-03-21 NOTE — PROGRESS NOTES
Physical Therapy  Facility/Department: [address] MED SURG  Daily Treatment Note  NAME: Erika Bernabe. : 1950  MRN: [id number]    Date of Service: 3/21/2018    PT re-evaluation/treatment was re-attempted again today and still awaiting off-loading shoe that podiatrist requests he has to keep L forefoot NWB while he is up walking. Will re-attempt another time. Aziza Webb., P.T.   License Number: [id number]

## 2018-03-21 NOTE — PROGRESS NOTES
Amount: None  Wound 03/08/18 right great toe medial-Drainage Amount: None  Wound 03/08/18 right 2nd medial toe-Drainage Amount: None  Incision 03/19/18 Toe (Comment  which one) Left-Drainage Amount: None  Incision 03/19/18 Toe (Comment  which one) Right-Drainage Amount: None  Wound 03/07/18 Left left great toe distal-Drainage Description: Yellow, Serous  Wound 03/08/18 right great toe medial-Drainage Description: Serosanguinous  Wound 03/08/18 right 2nd medial toe-Drainage Description: Tan  Wound 03/07/18 Left left great toe distal-Odor: None  Wound 03/08/18 right great toe medial-Odor: None  Wound 03/08/18 right great toe medial-Margins: Epibole (rolled edges)    Wound Reference Date is when first assessed. Measurements shown are from today's visit. Wound 03/07/18 Left left great toe distal-Wound Length (cm): 3 cm  Wound 03/08/18 right great toe medial-Wound Length (cm): 2 cm  Wound 03/08/18 right 2nd medial toe-Wound Length (cm): 3 cm  Wound 03/07/18 Left left great toe distal-Wound Width (cm): 2.5 cm  Wound 03/08/18 right great toe medial-Wound Width (cm): 4 cm  Wound 03/08/18 right 2nd medial toe-Wound Width (cm): 0.4 cm  Wound 03/07/18 Left left great toe distal-Wound Depth (cm) : obs  Wound 03/08/18 right great toe medial-Wound Depth (cm) : 0.2  Wound 03/08/18 right 2nd medial toe-Wound Depth (cm) : obs (dark red, dry)      Pre Debridement Measurements:  Wound 03/07/18 Left left great toe distal (Active)   Wound Type Wound 3/9/2018 10:21 AM   Dressing Status Clean;Dry; Intact 3/9/2018 10:21 AM   Dressing Changed Changed/New 3/9/2018 10:21 AM   Dressing/Treatment Pharmaceutical agent (see eMar);4x4;Dry dressing 3/9/2018 10:21 AM   Wound Cleansed Rinsed/Irrigated with saline 3/9/2018 10:21 AM   Dressing Change Due 03/10/18 3/9/2018 10:21 AM   Wound Length (cm) 2 cm 3/8/2018  3:28 PM   Wound Width (cm) 2 cm 3/8/2018  3:28 PM   Wound Depth (cm)  obs 3/8/2018  3:28 PM   Calculated Wound Size (cm^2) (l*w) 4 cm^2

## 2018-03-21 NOTE — PROGRESS NOTES
118/60 98.2 °F (36.8 °C) Oral 65 16 99 %   03/20/18 1615 (!) 110/56 98.9 °F (37.2 °C) Oral 61 18 -       CBC with Differential:    Lab Results   Component Value Date    WBC 4.7 03/20/2018    RBC 3.71 03/20/2018    HGB 11.0 03/20/2018    HCT 35.4 03/20/2018     03/20/2018    MCV 95.4 03/20/2018    MCH 29.6 03/20/2018    MCHC 31.1 03/20/2018    RDW 17.0 03/20/2018    NRBC 0.0 03/18/2018    SEGSPCT 49 09/12/2013    LYMPHOPCT 35.2 03/20/2018    MONOPCT 5.7 03/20/2018    BASOPCT 0.6 03/20/2018    MONOSABS 0.27 03/20/2018    LYMPHSABS 1.66 03/20/2018    EOSABS 0.79 03/20/2018    BASOSABS 0.03 03/20/2018     CMP:    Lab Results   Component Value Date     03/20/2018    K 4.5 03/20/2018    K 5.7 03/08/2018     03/20/2018    CO2 27 03/20/2018    BUN 30 03/20/2018    CREATININE 2.0 03/20/2018    GFRAA 41 03/20/2018    LABGLOM 41 03/20/2018    GLUCOSE 157 03/20/2018    GLUCOSE 242 05/17/2012    PROT 6.8 03/20/2018    LABALBU 2.9 03/20/2018    LABALBU 4.2 05/17/2012    CALCIUM 9.0 03/20/2018    BILITOT 0.4 03/20/2018    ALKPHOS 88 03/20/2018    AST 15 03/20/2018    ALT 11 03/20/2018       BP (!) 118/57   Pulse 55   Temp 97.7 °F (36.5 °C) (Oral)   Resp 16   Ht 6' (1.829 m)   Wt 155 lb (70.3 kg)   SpO2 98%   BMI 21.02 kg/m²     Physical Exam  Const/Neuro- unchanged, no signs of acute distress, Alert  ENMT- Within Normal Limits, Normocephalic, mucous membranes pink/moist, No thrush  Neck: Neck supple  Heart- Regular, Rate, Rhythm- no murmur appreciated. Lungs- clear to ascultation. Respirations even and nonlabored. Abdomen- Soft, bowel sounds positive, non tender  Musculo/Extremities-  Equal and symmetrical, no edema. No tenderness. Skin:  Warm and dry, free from rashes. S/P BONE RESECTION       Cultures reviewed  MIX ORG IN CX AND BETA HEMOLYTIC STREP    Radiology reviewed  XR ABDOMEN (KUB) (SINGLE AP VIEW)   Final Result   Nonspecific bowel gas pattern.             XR UGI & SMALL BOWEL   Final Result

## 2018-03-21 NOTE — DISCHARGE SUMMARY
breakfast)  Qty: 30 tablet      doxycycline hyclate (VIBRAMYCIN) 100 MG capsule Take 1 capsule by mouth 2 times daily  Qty: 60 capsule, Refills: 1      levofloxacin (LEVAQUIN) 500 MG tablet Take 1 tablet by mouth daily for 21 days  Qty: 21 tablet, Refills: 0         CONTINUE these medications which have CHANGED    Details   insulin lispro (HUMALOG) 100 UNIT/ML injection vial Inject 2-10 Units into the skin 4 times daily (before meals and nightly)  Qty: 1 vial, Refills: 3      atenolol (TENORMIN) 25 MG tablet Take 0.5 tablets by mouth daily  Qty: 30 tablet, Refills: 3      docusate sodium (COLACE, DULCOLAX) 100 MG CAPS Take 100 mg by mouth daily         CONTINUE these medications which have NOT CHANGED    Details   fluticasone (FLONASE) 50 MCG/ACT nasal spray 1 spray by Nasal route 2 times daily      Multiple Vitamins-Minerals (THERAPEUTIC MULTIVITAMIN-MINERALS) tablet Take 1 tablet by mouth daily      Dextromethorphan-guaiFENesin  MG/5ML SYRP Take 15 mLs by mouth every 4 hours as needed for Cough      mirtazapine (REMERON) 15 MG tablet Take 7.5 mg by mouth nightly      insulin glargine (LANTUS) 100 UNIT/ML injection vial Inject 10 Units into the skin nightly  Qty: 1 vial, Refills: 3      vitamin D (ERGOCALCIFEROL) 01985 UNITS capsule Take 1 capsule by mouth once a week  Qty: 30 capsule, Refills: 0      donepezil (ARICEPT) 5 MG tablet Take 1 tablet by mouth nightly  Qty: 30 tablet, Refills: 3      magnesium oxide (MAG-OX) 400 (240 MG) MG tablet Take 1 tablet by mouth daily      acetaminophen 650 MG TABS Take 650 mg by mouth 3 times daily as needed  Qty: 120 tablet, Refills: 3      atorvastatin (LIPITOR) 80 MG tablet Take 1 tablet by mouth nightly  Qty: 30 tablet, Refills: 3      ammonium lactate (LAC-HYDRIN) 12 % lotion Apply to affected areas as needed  Qty: 1 Bottle, Refills: 3      clopidogrel (PLAVIX) 75 MG tablet Take 1 tablet by mouth daily  Qty: 30 tablet, Refills: 3         STOP taking these medications

## 2018-03-21 NOTE — PROGRESS NOTES
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 No rashes or lesions. Neck:  supple, no JVD  Lungs:  Normal expansion. Clear to auscultation. No rales, rhonchi, or wheezing. Heart:  Heart regular rate and rhythm     Abdominal: Abdomen soft, non-tender.  BS normal. No masses,  No organomegaly  Extremities: no edema  Psych: cooperative and appropriate      DATA:    CBC with Differential:    Lab Results   Component Value Date    WBC 5.2 03/21/2018    RBC 3.30 03/21/2018    HGB 9.9 03/21/2018    HCT 31.8 03/21/2018     03/21/2018    MCV 96.4 03/21/2018    MCH 30.0 03/21/2018    MCHC 31.1 03/21/2018    RDW 17.1 03/21/2018    NRBC 0.0 03/18/2018    SEGSPCT 49 09/12/2013    LYMPHOPCT 33.5 03/21/2018    MONOPCT 3.9 03/21/2018    BASOPCT 0.2 03/21/2018    MONOSABS 0.20 03/21/2018    LYMPHSABS 1.73 03/21/2018    EOSABS 0.93 03/21/2018    BASOSABS 0.01 03/21/2018     BMP:    Lab Results   Component Value Date     03/21/2018    K 4.0 03/21/2018    K 5.7 03/08/2018     03/21/2018    CO2 27 03/21/2018    BUN 40 03/21/2018    LABALBU 2.9 03/21/2018    LABALBU 4.2 05/17/2012    CREATININE 2.2 03/21/2018    CALCIUM 8.6 03/21/2018    GFRAA 36 03/21/2018    LABGLOM 36 03/21/2018    GLUCOSE 169 03/21/2018    GLUCOSE 242 05/17/2012          collagenase   Topical Daily    doxycycline hyclate  100 mg Oral 2 times per day    levofloxacin  500 mg Oral Daily    cloNIDine  0.1 mg Oral TID    atenolol  12.5 mg Oral Daily    mupirocin   Topical Daily    hydrALAZINE  50 mg Oral 4 times per day    pantoprazole  40 mg Oral BID AC    amLODIPine  10 mg Oral suprapubic catheter-no obstruction noted on US    Secondary hyperparathyroidism -adequate  Vit D 39 -hypocalcemia in the setting of hypoalbuminemia- ionized Ca++ WNL and PO4 WNL    HTN with CKD I-IV: blood pressure acceptable currently; follow    Hyponatremia: mild, with hyperglycemia, follow        Anastacia Stubbs MD  3/21/2018 3:06 PM

## 2018-03-21 NOTE — PROGRESS NOTES
suboptimal prep demonstrating diffuse diverticulosis , melanosis coli otherwise unremarkable  ? Ischemic lesion to LLE 1st toe--S/P OR 3/19/18      Plan:     · S/P total nail avulsion right 1st digit; resection of bone left 1st digit on 3/19.  ? OK for discharge from GI POV  ? Will continue to follow as outpatient    We will follow closely with you. Discussed with Dr. Jeremiah Coronel who is covering for Dr Penny Alexis developed by Dr. Jeremiah Coronel    Electronically signed by Aracely Ferrell CNP on 3/21/2018 at 11:50AM for Dr. Jeremiah Coronel who is covering for Dr Marilyn Alvarez will be out of town returning 3/30/18.  Dr. Jeremiah Coronel covering in Dr. Paradise keating.

## 2018-03-21 NOTE — PROGRESS NOTES
Pt stated he has already had the flu/pnuemonia vaccine.  Electronically signed by Maggie Galeas RN on 3/21/2018 at 2:48 PM

## 2018-03-22 LAB — ANAEROBIC CULTURE: NORMAL

## 2018-04-23 LAB
FUNGUS (MYCOLOGY) CULTURE: NORMAL
FUNGUS STAIN: NORMAL

## 2018-05-08 LAB
AFB CULTURE (MYCOBACTERIA): NORMAL
AFB SMEAR: NORMAL

## 2018-05-17 ENCOUNTER — HOSPITAL ENCOUNTER (OUTPATIENT)
Age: 68
Discharge: HOME | End: 2018-05-19
Payer: MEDICAID

## 2018-05-17 PROCEDURE — 87070 CULTURE OTHR SPECIMN AEROBIC: CPT

## 2018-05-17 PROCEDURE — 87102 FUNGUS ISOLATION CULTURE: CPT

## 2018-05-17 PROCEDURE — 87205 SMEAR GRAM STAIN: CPT

## 2018-05-17 PROCEDURE — 87075 CULTR BACTERIA EXCEPT BLOOD: CPT

## 2018-05-18 LAB — GRAM STAIN ORDERABLE: NORMAL

## 2018-05-20 LAB
ANAEROBIC CULTURE: ABNORMAL
ANAEROBIC CULTURE: ABNORMAL
ORGANISM: ABNORMAL
ORGANISM: ABNORMAL
WOUND/ABSCESS: ABNORMAL
WOUND/ABSCESS: ABNORMAL

## 2018-06-08 ENCOUNTER — HOSPITAL ENCOUNTER (OUTPATIENT)
Dept: WOUND CARE | Age: 68
Discharge: HOME | End: 2018-06-08
Payer: MEDICAID

## 2018-06-08 ENCOUNTER — HOSPITAL ENCOUNTER (OUTPATIENT)
Age: 68
Discharge: HOME | End: 2018-06-10
Payer: MEDICAID

## 2018-06-08 VITALS
HEART RATE: 68 BPM | TEMPERATURE: 97.6 F | DIASTOLIC BLOOD PRESSURE: 66 MMHG | RESPIRATION RATE: 16 BRPM | BODY MASS INDEX: 20.99 KG/M2 | SYSTOLIC BLOOD PRESSURE: 122 MMHG | WEIGHT: 155 LBS | HEIGHT: 72 IN

## 2018-06-08 PROCEDURE — 11042 DBRDMT SUBQ TIS 1ST 20SQCM/<: CPT

## 2018-06-08 PROCEDURE — 87077 CULTURE AEROBIC IDENTIFY: CPT

## 2018-06-08 PROCEDURE — 87186 SC STD MICRODIL/AGAR DIL: CPT

## 2018-06-08 PROCEDURE — 87070 CULTURE OTHR SPECIMN AEROBIC: CPT

## 2018-06-08 PROCEDURE — 99214 OFFICE O/P EST MOD 30 MIN: CPT

## 2018-06-08 PROCEDURE — 87075 CULTR BACTERIA EXCEPT BLOOD: CPT

## 2018-06-08 RX ORDER — DOXYCYCLINE 100 MG/1
100 CAPSULE ORAL 2 TIMES DAILY
Status: ON HOLD | COMMUNITY
End: 2020-01-01 | Stop reason: HOSPADM

## 2018-06-10 LAB — ANAEROBIC CULTURE: NORMAL

## 2018-06-11 LAB
ORGANISM: ABNORMAL
WOUND/ABSCESS: ABNORMAL
WOUND/ABSCESS: ABNORMAL

## 2018-06-12 ENCOUNTER — HOSPITAL ENCOUNTER (OUTPATIENT)
Dept: WOUND CARE | Age: 68
Discharge: HOME | End: 2018-06-12
Payer: MEDICAID

## 2018-06-12 VITALS
RESPIRATION RATE: 16 BRPM | HEART RATE: 78 BPM | TEMPERATURE: 98.2 F | SYSTOLIC BLOOD PRESSURE: 102 MMHG | DIASTOLIC BLOOD PRESSURE: 60 MMHG | HEIGHT: 72 IN

## 2018-06-12 DIAGNOSIS — E11.52 TYPE 2 DIABETES MELLITUS WITH DIABETIC PERIPHERAL ANGIOPATHY AND GANGRENE, WITH LONG-TERM CURRENT USE OF INSULIN (HCC): Chronic | ICD-10-CM

## 2018-06-12 DIAGNOSIS — Z79.4 TYPE 2 DIABETES MELLITUS WITH DIABETIC PERIPHERAL ANGIOPATHY AND GANGRENE, WITH LONG-TERM CURRENT USE OF INSULIN (HCC): Chronic | ICD-10-CM

## 2018-06-12 DIAGNOSIS — I73.9 PVD (PERIPHERAL VASCULAR DISEASE) WITH CLAUDICATION (HCC): Primary | Chronic | ICD-10-CM

## 2018-06-12 PROCEDURE — 99213 OFFICE O/P EST LOW 20 MIN: CPT

## 2018-06-12 RX ORDER — GENTAMICIN SULFATE 1 MG/G
CREAM TOPICAL 3 TIMES DAILY
COMMUNITY
End: 2018-07-06 | Stop reason: ALTCHOICE

## 2018-06-12 RX ORDER — CLOPIDOGREL BISULFATE 75 MG/1
75 TABLET ORAL DAILY
COMMUNITY
End: 2018-07-30

## 2018-06-12 RX ORDER — DONEPEZIL HYDROCHLORIDE 5 MG/1
5 TABLET, FILM COATED ORAL NIGHTLY
COMMUNITY
End: 2018-10-26 | Stop reason: ALTCHOICE

## 2018-06-12 RX ORDER — AMOXICILLIN AND CLAVULANATE POTASSIUM 500; 125 MG/1; MG/1
1 TABLET, FILM COATED ORAL 2 TIMES DAILY
Status: ON HOLD | COMMUNITY
Start: 2018-05-31 | End: 2020-01-01 | Stop reason: HOSPADM

## 2018-06-18 LAB
FUNGUS (MYCOLOGY) CULTURE: NORMAL
FUNGUS STAIN: NORMAL

## 2018-06-22 ENCOUNTER — HOSPITAL ENCOUNTER (OUTPATIENT)
Dept: WOUND CARE | Age: 68
Discharge: HOME | End: 2018-06-22
Payer: MEDICAID

## 2018-06-22 VITALS
SYSTOLIC BLOOD PRESSURE: 110 MMHG | BODY MASS INDEX: 20.99 KG/M2 | DIASTOLIC BLOOD PRESSURE: 60 MMHG | RESPIRATION RATE: 18 BRPM | HEIGHT: 72 IN | WEIGHT: 155 LBS | TEMPERATURE: 98.1 F | HEART RATE: 76 BPM

## 2018-06-22 PROCEDURE — 11042 DBRDMT SUBQ TIS 1ST 20SQCM/<: CPT

## 2018-06-29 ENCOUNTER — HOSPITAL ENCOUNTER (OUTPATIENT)
Dept: WOUND CARE | Age: 68
Discharge: HOME | End: 2018-06-29
Payer: MEDICAID

## 2018-06-29 VITALS
WEIGHT: 155 LBS | HEIGHT: 72 IN | HEART RATE: 60 BPM | SYSTOLIC BLOOD PRESSURE: 138 MMHG | BODY MASS INDEX: 20.99 KG/M2 | DIASTOLIC BLOOD PRESSURE: 70 MMHG | RESPIRATION RATE: 18 BRPM | TEMPERATURE: 97.5 F

## 2018-06-29 PROCEDURE — 11042 DBRDMT SUBQ TIS 1ST 20SQCM/<: CPT

## 2018-07-06 ENCOUNTER — HOSPITAL ENCOUNTER (OUTPATIENT)
Dept: WOUND CARE | Age: 68
Discharge: HOME | End: 2018-07-06
Payer: MEDICAID

## 2018-07-06 VITALS
BODY MASS INDEX: 20.99 KG/M2 | WEIGHT: 155 LBS | TEMPERATURE: 98.5 F | HEIGHT: 72 IN | RESPIRATION RATE: 18 BRPM | HEART RATE: 72 BPM

## 2018-07-06 PROCEDURE — 11042 DBRDMT SUBQ TIS 1ST 20SQCM/<: CPT

## 2018-07-06 NOTE — PROGRESS NOTES
[facility] Followup Visit Note    Referring Physician : Nishi Velez DO  Chrissie Babinski. MEDICAL RECORD NUMBER:  [id number]  AGE: 80 y.o. GENDER: male  : 1950  EPISODE DATE:  2018    Subjective:     Chief Complaint   Patient presents with    Wound Check     rt foot      HISTORY of PRESENT ILLNESS HPI   Chrissie Babinski. is a 80 y.o. male who presents today for wound/ulcer evaluation.    History of Wound Context:  1 month      Wound/Ulcer Pain Timing/Severity: intermittent  Quality of pain: aching  Severity:  4 / 10   Modifying Factors: Pain worsens with walking  Associated Signs/Symptoms: edema, erythema and drainage    Ulcer Identification:  Ulcer Type: arterial and diabetic  Contributing Factors: edema, venous stasis, lymphedema, diabetes, poor glucose control, chronic pressure and arterial insufficiency    Diabetic/Pressure/Non Pressure Ulcers only:  Ulcer: Diabetic ulcer, fat layer exposed    Wound: N/A        PAST MEDICAL HISTORY      Diagnosis Date    Anemia     Arthritis     Atherosclerosis of native arteries of extremity with rest pain (HCC) 2015    Back pain, chronic     Cerebral artery occlusion with cerebral infarction ([address]     Cognitive communication deficit     Depression     Diabetes mellitus ([address]     Diabetic ketoacidosis without coma associated with type 2 diabetes mellitus ([address] 2016    Diabetic ulcer of right foot associated with type 2 diabetes mellitus ([address] 2016    Difficulty walking     Gastrointestinal hemorrhage     Hypertension     Neuromuscular dysfunction of bladder     Onychomycosis 9235    Other symbolic dysfunctions (CODE)     Pain in lower limb 2015    Poor memory     and ?dementia, early stage    Pulmonary nodule 2016    PVD (peripheral vascular disease) with claudication ([address] 2015    Right sided weakness 2015    Stroke ([address] 2016    with expressive aphasia, memory problems, b/l LE weakness, Pulse 72   Temp 98.5 °F (36.9 °C)   Resp 18   Ht 6' (1.829 m)   Wt 155 lb (70.3 kg)   BMI 21.02 kg/m²   Wt Readings from Last 3 Encounters:   07/06/18 155 lb (70.3 kg)   06/29/18 155 lb (70.3 kg)   06/22/18 155 lb (70.3 kg)     PHYSICAL EXAM  CONSTITUTIONAL:   Awake, alert, cooperative   EYES:  lids and lashes normal   ENT: external ears and nose without lesions   NECK:  supple, symmetrical, trachea midline   SKIN:  Open wound Present    Assessment:     Problem List Items Addressed This Visit     None        Procedure Note  Indications:  Based on my examination of this patient's wound(s)/ulcer(s) today, debridement is required to promote healing and evaluate the wound base. Performed by: Davis Rodas DPM    Consent obtained:  Yes    Time out taken:  Yes    Pain Control: Anesthetic  Anesthetic: 2% Lidocaine Gel Topical     Debridement:Excisional Debridement    Using curette the wound(s)/ulcer(s) was/were sharply debrided down through and including the removal of subcutaneous tissue. Devitalized Tissue Debrided:  fibrin, biofilm and slough to stimulate bleeding to promote healing, post debridement good bleeding base and wound edges noted    Pre Debridement Measurements:  Are located in the [place]  Documentation Flow Sheet    Wound/Ulcer #: 1 and 2    Post Debridement Measurements:  Wound/Ulcer Descriptions are Pre Debridement except measurements:    Wound 03/07/18 Left left great toe distal (Active)   Number of days: 120       Wound 03/08/18 right great toe medial (Active)   Number of days: 119       Wound 03/08/18 right 2nd medial toe (Active)   Number of days: 119       Wound 06/08/18 Other (Comment) Toe (Comment  which one) Left;Distal #1 aquired 5-1-18 diabetic ulcer great toe (Active)   Wound Image   6/12/2018 10:03 AM   Wound Type Wound 6/12/2018 10:03 AM   Wound Diabetic Agosto 3 6/12/2018 10:03 AM   Dressing Status Clean;Dry; Intact 6/29/2018 12:48 PM   Dressing Changed Changed/New 6/29/2018 12:48 PM   Dressing/Treatment Dry dressing 6/29/2018 12:48 PM   Wound Cleansed Wound cleanser 6/29/2018 12:48 PM   Wound Length (cm) 0.8 cm 7/6/2018 12:16 PM   Wound Width (cm) 0.5 cm 7/6/2018 12:16 PM   Wound Depth (cm)  0.2 7/6/2018 12:16 PM   Calculated Wound Size (cm^2) (l*w) 0.4 cm^2 7/6/2018 12:16 PM   Change in Wound Size % (l*w) 33.33 7/6/2018 12:16 PM   Wound Assessment Red;Yellow 7/6/2018 12:16 PM   Drainage Amount Scant 7/6/2018 12:16 PM   Drainage Description Yellow 7/6/2018 12:16 PM   Odor None 7/6/2018 12:16 PM   Exposed structure Bone 6/29/2018 12:20 PM   Louise-wound Assessment Dry 7/6/2018 12:16 PM   Non-staged Wound Description Full thickness 6/12/2018 10:03 AM   Debridement per physician None 6/22/2018  9:37 AM   Time out Yes 6/29/2018 12:20 PM   Procedural Pain 0 6/22/2018  9:37 AM   Post procedural Pain 0 6/22/2018  9:37 AM   Number of days: 28       Wound 06/08/18 Other (Comment) Toe (Comment  which one) Right;Distal #2 aquired 5-1-18 diabetic ulcer great toe (Active)   Wound Image   6/12/2018 10:03 AM   Wound Type Wound 6/12/2018 10:03 AM   Wound Diabetic Agosto 3 6/12/2018 10:03 AM   Dressing Status Clean;Dry; Intact 6/29/2018 12:48 PM   Dressing Changed Changed/New 6/29/2018 12:48 PM   Dressing/Treatment Dry dressing 6/29/2018 12:48 PM   Wound Cleansed Wound cleanser 6/29/2018 12:48 PM   Wound Length (cm) 1.5 cm 7/6/2018 12:16 PM   Wound Width (cm) 1.5 cm 7/6/2018 12:16 PM   Wound Depth (cm)  0.4 7/6/2018 12:16 PM   Calculated Wound Size (cm^2) (l*w) 2.25 cm^2 7/6/2018 12:16 PM   Change in Wound Size % (l*w) 62.37 7/6/2018 12:16 PM   Wound Assessment Yellow; White 7/6/2018 12:16 PM   Drainage Amount Scant 7/6/2018 12:16 PM   Drainage Description Yellow 7/6/2018 12:16 PM   Odor None 7/6/2018 12:16 PM   Exposed structure Bone 6/29/2018 12:20 PM   Louise-wound Assessment Dry;Calloused 7/6/2018 12:16 PM   Non-staged Wound Description Full thickness 6/12/2018 10:03 AM   Culture

## 2018-07-09 ENCOUNTER — HOSPITAL ENCOUNTER (OUTPATIENT)
Age: 68
Discharge: HOME | End: 2018-07-09
Payer: MEDICAID

## 2018-07-09 ENCOUNTER — HOSPITAL ENCOUNTER (OUTPATIENT)
Dept: CT IMAGING | Age: 68
Discharge: HOME | End: 2018-07-11
Payer: MEDICAID

## 2018-07-09 DIAGNOSIS — I73.9 PVD (PERIPHERAL VASCULAR DISEASE) WITH CLAUDICATION (HCC): Chronic | ICD-10-CM

## 2018-07-09 LAB
ANION GAP SERPL CALCULATED.3IONS-SCNC: 13 MMOL/L (ref 7–16)
BUN BLDV-MCNC: 27 MG/DL (ref 8–23)
CALCIUM SERPL-MCNC: 9.7 MG/DL (ref 8.6–10.2)
CHLORIDE BLD-SCNC: 106 MMOL/L (ref 98–107)
CO2: 21 MMOL/L (ref 22–29)
CREAT SERPL-MCNC: 1.6 MG/DL (ref 0.7–1.2)
GFR AFRICAN AMERICAN: 52
GFR NON-AFRICAN AMERICAN: 52 ML/MIN/1.73
GLUCOSE BLD-MCNC: 152 MG/DL (ref 74–109)
POTASSIUM SERPL-SCNC: 6.2 MMOL/L (ref 3.5–5)
SODIUM BLD-SCNC: 140 MMOL/L (ref 132–146)

## 2018-07-09 PROCEDURE — 2580000003 HC RX 258: Performed by: RADIOLOGY

## 2018-07-09 PROCEDURE — 80048 BASIC METABOLIC PNL TOTAL CA: CPT

## 2018-07-09 PROCEDURE — 36415 COLL VENOUS BLD VENIPUNCTURE: CPT

## 2018-07-09 PROCEDURE — 6360000004 HC RX CONTRAST MEDICATION: Performed by: RADIOLOGY

## 2018-07-09 PROCEDURE — 75635 CT ANGIO ABDOMINAL ARTERIES: CPT

## 2018-07-09 RX ORDER — SODIUM CHLORIDE 0.9 % (FLUSH) 0.9 %
10 SYRINGE (ML) INJECTION ONCE
Status: COMPLETED | OUTPATIENT
Start: 2018-07-09 | End: 2018-07-09

## 2018-07-09 RX ADMIN — IOPAMIDOL 120 ML: 755 INJECTION, SOLUTION INTRAVENOUS at 12:25

## 2018-07-09 RX ADMIN — Medication 10 ML: at 12:25

## 2018-07-13 ENCOUNTER — HOSPITAL ENCOUNTER (OUTPATIENT)
Age: 68
Discharge: HOME | End: 2018-07-15
Payer: MEDICAID

## 2018-07-13 ENCOUNTER — HOSPITAL ENCOUNTER (OUTPATIENT)
Dept: WOUND CARE | Age: 68
Discharge: HOME | End: 2018-07-13
Payer: MEDICAID

## 2018-07-13 VITALS
SYSTOLIC BLOOD PRESSURE: 102 MMHG | DIASTOLIC BLOOD PRESSURE: 60 MMHG | WEIGHT: 155 LBS | RESPIRATION RATE: 18 BRPM | HEART RATE: 64 BPM | TEMPERATURE: 97.4 F | BODY MASS INDEX: 20.99 KG/M2 | HEIGHT: 72 IN

## 2018-07-13 PROCEDURE — 88311 DECALCIFY TISSUE: CPT

## 2018-07-13 PROCEDURE — 87186 SC STD MICRODIL/AGAR DIL: CPT

## 2018-07-13 PROCEDURE — 87077 CULTURE AEROBIC IDENTIFY: CPT

## 2018-07-13 PROCEDURE — 11044 DBRDMT BONE 1ST 20 SQ CM/<: CPT

## 2018-07-13 PROCEDURE — 87075 CULTR BACTERIA EXCEPT BLOOD: CPT

## 2018-07-13 PROCEDURE — 87205 SMEAR GRAM STAIN: CPT

## 2018-07-13 PROCEDURE — 88307 TISSUE EXAM BY PATHOLOGIST: CPT

## 2018-07-13 PROCEDURE — 87070 CULTURE OTHR SPECIMN AEROBIC: CPT

## 2018-07-13 ASSESSMENT — PAIN DESCRIPTION - LOCATION: LOCATION: TOE (COMMENT WHICH ONE)

## 2018-07-13 ASSESSMENT — PAIN DESCRIPTION - PROGRESSION: CLINICAL_PROGRESSION: NOT CHANGED

## 2018-07-13 ASSESSMENT — PAIN DESCRIPTION - FREQUENCY: FREQUENCY: CONTINUOUS

## 2018-07-13 ASSESSMENT — PAIN DESCRIPTION - DESCRIPTORS: DESCRIPTORS: THROBBING

## 2018-07-13 ASSESSMENT — PAIN DESCRIPTION - ONSET: ONSET: ON-GOING

## 2018-07-13 ASSESSMENT — PAIN DESCRIPTION - ORIENTATION: ORIENTATION: LEFT;RIGHT

## 2018-07-13 ASSESSMENT — PAIN DESCRIPTION - PAIN TYPE: TYPE: CHRONIC PAIN

## 2018-07-13 NOTE — PROGRESS NOTES
W[facility] Healing Center Followup Visit Note    Referring Physician : DO Andreea Manning. MEDICAL RECORD NUMBER:  [id number]  AGE: 80 y.o. GENDER: male  : 1950  EPISODE DATE:  2018    Subjective:     Chief Complaint   Patient presents with    Wound Check     samaria great toes      HISTORY of PRESENT ILLNESS HPI   Andreea Flores. is a 80 y.o. male who presents today for wound/ulcer evaluation.    History of Wound Context:  1 month      Wound/Ulcer Pain Timing/Severity: constant  Quality of pain: sharp, aching, burning  Severity:  4 / 10   Modifying Factors: Pain worsens with walking  Associated Signs/Symptoms: edema and drainage    Ulcer Identification:  Ulcer Type: arterial and diabetic  Contributing Factors: diabetes, poor glucose control, chronic pressure and arterial insufficiency    Diabetic/Pressure/Non Pressure Ulcers only:  Ulcer: Diabetic ulcer, fat layer exposed    Wound: N/A        PAST MEDICAL HISTORY      Diagnosis Date    Anemia     Arthritis     Atherosclerosis of native arteries of extremity with rest pain (HCC) 2015    Back pain, chronic     Cerebral artery occlusion with cerebral infarction ([address]     Cognitive communication deficit     Depression     Diabetes mellitus ([address]     Diabetic ketoacidosis without coma associated with type 2 diabetes mellitus ([address] 2016    Diabetic ulcer of right foot associated with type 2 diabetes mellitus ([address] 2016    Difficulty walking     Gastrointestinal hemorrhage     Hypertension     Neuromuscular dysfunction of bladder     Onychomycosis     Other symbolic dysfunctions (CODE)     Pain in lower limb 2015    Poor memory     and ?dementia, early stage    Pulmonary nodule 2016    PVD (peripheral vascular disease) with claudication ([address] 2015    Right sided weakness 2015    Stroke ([address] 2016    with expressive aphasia, memory problems, b/l LE weakness, incontinence of stool and units  301-350= 10 units  351-400= 12 units   Notify md <70 or >400) 1 vial 3    cloNIDine (CATAPRES) 0.1 MG tablet Take 1 tablet by mouth 3 times daily 60 tablet 3    hydrALAZINE (APRESOLINE) 50 MG tablet Take 1 tablet by mouth 4 times daily (Patient taking differently: Take 50 mg by mouth 4 times daily Hold for sys<110 dys <60) 90 tablet 3    atenolol (TENORMIN) 25 MG tablet Take 0.5 tablets by mouth daily 30 tablet 3    amLODIPine (NORVASC) 10 MG tablet Take 1 tablet by mouth nightly 30 tablet 3    docusate sodium (COLACE, DULCOLAX) 100 MG CAPS Take 100 mg by mouth daily      calcitRIOL (ROCALTROL) 0.25 MCG capsule Take 1 capsule by mouth every other day M / W / F 30 capsule 3    pantoprazole (PROTONIX) 40 MG tablet Take 1 tablet by mouth 2 times daily (before meals) 30 tablet 0    ferrous sulfate 324 (65 Fe) MG EC tablet Take 1 tablet by mouth daily (with breakfast) 30 tablet     fluticasone (FLONASE) 50 MCG/ACT nasal spray 1 spray by Nasal route 2 times daily      Multiple Vitamins-Minerals (THERAPEUTIC MULTIVITAMIN-MINERALS) tablet Take 1 tablet by mouth daily      Dextromethorphan-guaiFENesin  MG/5ML SYRP Take 15 mLs by mouth every 4 hours as needed for Cough      mirtazapine (REMERON) 15 MG tablet Take 7.5 mg by mouth nightly      vitamin D (ERGOCALCIFEROL) 43615 UNITS capsule Take 1 capsule by mouth once a week (Patient taking differently: Take 50,000 Units by mouth once a week ) 30 capsule 0    magnesium oxide (MAG-OX) 400 (240 MG) MG tablet Take 1 tablet by mouth daily      acetaminophen 650 MG TABS Take 650 mg by mouth 3 times daily as needed 120 tablet 3    atorvastatin (LIPITOR) 80 MG tablet Take 1 tablet by mouth nightly 30 tablet 3    ammonium lactate (LAC-HYDRIN) 12 % lotion Apply to affected areas as needed 1 Bottle 3     No current facility-administered medications on file prior to encounter.         REVIEW OF SYSTEMS See HPI    Objective:    /60   Pulse 64   Temp

## 2018-07-15 LAB — ANAEROBIC CULTURE: NORMAL

## 2018-07-17 LAB
GRAM STAIN RESULT: ABNORMAL
ORGANISM: ABNORMAL
WOUND/ABSCESS: ABNORMAL

## 2018-07-20 ENCOUNTER — HOSPITAL ENCOUNTER (OUTPATIENT)
Dept: WOUND CARE | Age: 68
Discharge: HOME | End: 2018-07-20
Payer: MEDICAID

## 2018-07-20 VITALS
HEART RATE: 72 BPM | WEIGHT: 155 LBS | DIASTOLIC BLOOD PRESSURE: 58 MMHG | HEIGHT: 72 IN | SYSTOLIC BLOOD PRESSURE: 104 MMHG | RESPIRATION RATE: 18 BRPM | BODY MASS INDEX: 20.99 KG/M2

## 2018-07-20 PROCEDURE — 11042 DBRDMT SUBQ TIS 1ST 20SQCM/<: CPT

## 2018-07-20 RX ORDER — TRAMADOL HYDROCHLORIDE 50 MG/1
50 TABLET ORAL EVERY 8 HOURS PRN
COMMUNITY
End: 2018-07-30

## 2018-07-20 NOTE — PLAN OF CARE
Problem: Pain:  Goal: Pain level will decrease  Pain level will decrease   Outcome: Ongoing    Goal: Control of acute pain  Control of acute pain   Outcome: Ongoing    Goal: Control of chronic pain  Control of chronic pain   Outcome: Ongoing      Problem: Wound:  Goal: Will show signs of wound healing; wound closure and no evidence of infection  Will show signs of wound healing; wound closure and no evidence of infection   Outcome: Ongoing      Problem: Arterial:  Goal: Optimize blood flow for wound healing  Optimize blood flow for wound healing   Outcome: Ongoing      Problem: Blood Glucose:  Goal: Ability to maintain appropriate glucose levels will improve  Ability to maintain appropriate glucose levels will improve   Outcome: Ongoing

## 2018-07-20 NOTE — PROGRESS NOTES
incontinence of stool and urine    Tobacco dependence 9/23/2015     Past Surgical History:   Procedure Laterality Date    BACK SURGERY      COLONOSCOPY      CYSTOSCOPY  4/8/16    Removal of Bladder Stone    ECHO COMPL W DOP COLOR FLOW  5/18/2012         ENDOSCOPY, COLON, DIAGNOSTIC      OTHER SURGICAL HISTORY  11/28/15    lap choley and cholangiogram    OTHER SURGICAL HISTORY  4/8/16    Suprapubic Catheter Insertion    GA COLONOSCOPY FLX DX W/COLLJ SPEC WHEN PFRMD N/A 3/12/2018    COLONOSCOPY performed by Sujatha Souza MD at [place] [address] OFFICE/OUTPT VISIT,PROCEDURE ONLY Bilateral 3/19/2018    RESECTION OF BONE CURRETAGE LEFT 1ST DIGIT performed by Davis [facility]as DPM at [facility] OR    UPPER GASTROINTESTINAL ENDOSCOPY  03/09/2018     Family History   Problem Relation Age of Onset    Heart Disease Mother      Social History   Substance Use Topics    Smoking status: Current Every Day Smoker     Packs/day: 0.10     Years: 50.00     Types: Cigarettes    Smokeless tobacco: Never Used    Alcohol use No     No Known Allergies  Current Outpatient Prescriptions on File Prior to Encounter   Medication Sig Dispense Refill    clopidogrel (PLAVIX) 75 MG tablet Take 75 mg by mouth daily      donepezil (ARICEPT) 5 MG tablet Take 5 mg by mouth nightly      amoxicillin-clavulanate (AUGMENTIN) 500-125 MG per tablet Take 1 tablet by mouth 2 times daily      insulin glargine (BASAGLAR KWIKPEN) 100 UNIT/ML injection pen Inject 20 Units into the skin nightly      doxycycline monohydrate (MONODOX) 100 MG capsule Take 100 mg by mouth 2 times daily      insulin lispro (HUMALOG) 100 UNIT/ML injection vial Inject 2-10 Units into the skin 4 times daily (before meals and nightly) (Patient taking differently: Inject 4-12 Units into the skin 4 times daily (before meals and nightly) 111-200= 4 units  201-250= 6 units  251-300= 8 units  301-350= 10 units  351-400= 12 units   Notify md <70 or >400) 1 vial 3    cloNIDine (CATAPRES) 0.1 MG tablet Take 1 tablet by mouth 3 times daily 60 tablet 3    hydrALAZINE (APRESOLINE) 50 MG tablet Take 1 tablet by mouth 4 times daily (Patient taking differently: Take 50 mg by mouth 4 times daily Hold for sys<110 dys <60) 90 tablet 3    atenolol (TENORMIN) 25 MG tablet Take 0.5 tablets by mouth daily 30 tablet 3    amLODIPine (NORVASC) 10 MG tablet Take 1 tablet by mouth nightly 30 tablet 3    docusate sodium (COLACE, DULCOLAX) 100 MG CAPS Take 100 mg by mouth daily      calcitRIOL (ROCALTROL) 0.25 MCG capsule Take 1 capsule by mouth every other day M / W / F 30 capsule 3    pantoprazole (PROTONIX) 40 MG tablet Take 1 tablet by mouth 2 times daily (before meals) 30 tablet 0    ferrous sulfate 324 (65 Fe) MG EC tablet Take 1 tablet by mouth daily (with breakfast) 30 tablet     fluticasone (FLONASE) 50 MCG/ACT nasal spray 1 spray by Nasal route 2 times daily      Multiple Vitamins-Minerals (THERAPEUTIC MULTIVITAMIN-MINERALS) tablet Take 1 tablet by mouth daily      Dextromethorphan-guaiFENesin  MG/5ML SYRP Take 15 mLs by mouth every 4 hours as needed for Cough      mirtazapine (REMERON) 15 MG tablet Take 7.5 mg by mouth nightly      magnesium oxide (MAG-OX) 400 (240 MG) MG tablet Take 1 tablet by mouth daily      acetaminophen 650 MG TABS Take 650 mg by mouth 3 times daily as needed 120 tablet 3    atorvastatin (LIPITOR) 80 MG tablet Take 1 tablet by mouth nightly 30 tablet 3    ammonium lactate (LAC-HYDRIN) 12 % lotion Apply to affected areas as needed 1 Bottle 3     No current facility-administered medications on file prior to encounter.         REVIEW OF SYSTEMS See HPI    Objective:    BP (!) 104/58   Pulse 72   Resp 18   Ht 6' (1.829 m)   Wt 155 lb (70.3 kg)   BMI 21.02 kg/m²   Wt Readings from Last 3 Encounters:   07/20/18 155 lb (70.3 kg)   07/13/18 155 lb (70.3 kg)   07/06/18 155 lb (70.3 kg)     PHYSICAL EXAM  CONSTITUTIONAL:   Awake, alert, cooperative Calculated Wound Size (cm^2) (l*w) 0.5 cm^2 7/20/2018 11:53 AM   Change in Wound Size % (l*w) 16.67 7/20/2018 11:53 AM   Wound Assessment Dry;Black; Nurys Alessandro 7/20/2018 11:31 AM   Drainage Amount None 7/20/2018 11:31 AM   Drainage Description Yellow 7/6/2018 12:16 PM   Odor None 7/20/2018 11:31 AM   Exposed structure Bone 6/29/2018 12:20 PM   Louise-wound Assessment Dry 7/20/2018 11:31 AM   Non-staged Wound Description Full thickness 6/12/2018 10:03 AM   Debridement per physician None 6/22/2018  9:37 AM   Time out Yes 7/20/2018 11:53 AM   Procedural Pain 0 6/22/2018  9:37 AM   Post procedural Pain 0 6/22/2018  9:37 AM   Number of days: 42       Wound 06/08/18 Other (Comment) Toe (Comment  which one) Right;Distal #2 aquired 5-1-18 diabetic ulcer great toe (Active)   Wound Image   6/12/2018 10:03 AM   Wound Type Wound 6/12/2018 10:03 AM   Wound Diabetic Agosto 3 6/12/2018 10:03 AM   Dressing Status Clean;Dry; Intact 7/6/2018 12:29 PM   Dressing Changed Changed/New 7/6/2018 12:29 PM   Dressing/Treatment Dry dressing 6/29/2018 12:48 PM   Wound Cleansed Rinsed/Irrigated with saline 7/6/2018 12:29 PM   Wound Length (cm) 1.2 cm 7/20/2018 11:53 AM   Wound Width (cm) 1.4 cm 7/20/2018 11:53 AM   Wound Depth (cm)  0.5 7/20/2018 11:53 AM   Calculated Wound Size (cm^2) (l*w) 1.68 cm^2 7/20/2018 11:53 AM   Change in Wound Size % (l*w) 71.91 7/20/2018 11:53 AM   Wound Assessment Pink;Yellow 7/20/2018 11:31 AM   Drainage Amount Scant 7/20/2018 11:31 AM   Drainage Description Serosanguinous 7/20/2018 11:31 AM   Odor None 7/20/2018 11:31 AM   Exposed structure Bone 7/13/2018 11:59 AM   Louise-wound Assessment Dry 7/20/2018 11:31 AM   Non-staged Wound Description Full thickness 6/12/2018 10:03 AM   Culture Taken Yes 6/8/2018 10:49 AM   Debridement per physician None 6/12/2018 10:40 AM   Time out Yes 7/20/2018 11:53 AM   Procedural Pain 0 6/22/2018  9:37 AM   Post procedural Pain 0 6/22/2018  9:37 AM   Number of days: 42       Incision drainage.     If you need medical attention outside of the business hours of the [address] [place] [address] Road please contact your PCP or go to the nearest emergency room        Electronically signed by Angelo Chiang DPM on 7/20/2018 at 11:56 AM

## 2018-07-23 ENCOUNTER — TELEPHONE (OUTPATIENT)
Dept: VASCULAR SURGERY | Age: 68
End: 2018-07-23

## 2018-07-23 ENCOUNTER — HOSPITAL ENCOUNTER (OUTPATIENT)
Dept: CARDIAC CATH/INVASIVE PROCEDURES | Age: 68
Discharge: HOME | End: 2018-07-23
Payer: MEDICAID

## 2018-07-27 ENCOUNTER — HOSPITAL ENCOUNTER (OUTPATIENT)
Dept: WOUND CARE | Age: 68
Discharge: HOME | End: 2018-07-27
Payer: MEDICAID

## 2018-07-30 ENCOUNTER — HOSPITAL ENCOUNTER (OUTPATIENT)
Dept: CARDIAC CATH/INVASIVE PROCEDURES | Age: 68
Discharge: NURSING, REHAB OR LONG-TERM CARE | End: 2018-07-31
Attending: SURGERY | Admitting: SURGERY
Payer: MEDICAID

## 2018-07-30 DIAGNOSIS — I73.9 PAD (PERIPHERAL ARTERY DISEASE) (HCC): ICD-10-CM

## 2018-07-30 LAB
ABO/RH: NORMAL
ANION GAP SERPL CALCULATED.3IONS-SCNC: 15 MMOL/L (ref 7–16)
ANTIBODY SCREEN: NORMAL
BUN BLDV-MCNC: 32 MG/DL (ref 8–23)
CALCIUM SERPL-MCNC: 9.7 MG/DL (ref 8.6–10.2)
CHLORIDE BLD-SCNC: 106 MMOL/L (ref 98–107)
CO2: 24 MMOL/L (ref 22–29)
CREAT SERPL-MCNC: 1.8 MG/DL (ref 0.7–1.2)
GFR AFRICAN AMERICAN: 46
GFR NON-AFRICAN AMERICAN: 46 ML/MIN/1.73
GLUCOSE BLD-MCNC: 101 MG/DL (ref 74–109)
HCT VFR BLD CALC: 26 % (ref 37–54)
HEMOGLOBIN: 7.9 G/DL (ref 12.5–16.5)
MCH RBC QN AUTO: 31.3 PG (ref 26–35)
MCHC RBC AUTO-ENTMCNC: 30.4 % (ref 32–34.5)
MCV RBC AUTO: 103.2 FL (ref 80–99.9)
METER GLUCOSE: 153 MG/DL (ref 70–110)
PDW BLD-RTO: 13.9 FL (ref 11.5–15)
PLATELET # BLD: 515 E9/L (ref 130–450)
PMV BLD AUTO: 8.4 FL (ref 7–12)
POTASSIUM SERPL-SCNC: 4.7 MMOL/L (ref 3.5–5)
RBC # BLD: 2.52 E12/L (ref 3.8–5.8)
SODIUM BLD-SCNC: 145 MMOL/L (ref 132–146)
WBC # BLD: 6.3 E9/L (ref 4.5–11.5)

## 2018-07-30 PROCEDURE — 82962 GLUCOSE BLOOD TEST: CPT

## 2018-07-30 PROCEDURE — 36200 PLACE CATHETER IN AORTA: CPT | Performed by: SURGERY

## 2018-07-30 PROCEDURE — 85027 COMPLETE CBC AUTOMATED: CPT

## 2018-07-30 PROCEDURE — C1894 INTRO/SHEATH, NON-LASER: HCPCS

## 2018-07-30 PROCEDURE — 86850 RBC ANTIBODY SCREEN: CPT

## 2018-07-30 PROCEDURE — 2580000003 HC RX 258: Performed by: SURGERY

## 2018-07-30 PROCEDURE — 37221 HC ILIAC TERRITORY PLASTY STENT: CPT | Performed by: SURGERY

## 2018-07-30 PROCEDURE — 2500000003 HC RX 250 WO HCPCS

## 2018-07-30 PROCEDURE — 75630 X-RAY AORTA LEG ARTERIES: CPT | Performed by: SURGERY

## 2018-07-30 PROCEDURE — C1725 CATH, TRANSLUMIN NON-LASER: HCPCS

## 2018-07-30 PROCEDURE — 6370000000 HC RX 637 (ALT 250 FOR IP): Performed by: SURGERY

## 2018-07-30 PROCEDURE — 86901 BLOOD TYPING SEROLOGIC RH(D): CPT

## 2018-07-30 PROCEDURE — 36415 COLL VENOUS BLD VENIPUNCTURE: CPT

## 2018-07-30 PROCEDURE — 86900 BLOOD TYPING SEROLOGIC ABO: CPT

## 2018-07-30 PROCEDURE — 37221 PR REVSC OPN/PRQ ILIAC ART W/STNT PLMT & ANGIOPLSTY: CPT | Performed by: SURGERY

## 2018-07-30 PROCEDURE — 80048 BASIC METABOLIC PNL TOTAL CA: CPT

## 2018-07-30 PROCEDURE — C1876 STENT, NON-COA/NON-COV W/DEL: HCPCS

## 2018-07-30 PROCEDURE — 6360000002 HC RX W HCPCS

## 2018-07-30 PROCEDURE — C1769 GUIDE WIRE: HCPCS

## 2018-07-30 PROCEDURE — 37223 PR REVSC OPN/PRQ ILIAC ART W/STNT & ANGIOP IPSILATL: CPT | Performed by: SURGERY

## 2018-07-30 PROCEDURE — 75716 ARTERY X-RAYS ARMS/LEGS: CPT | Performed by: SURGERY

## 2018-07-30 PROCEDURE — C1887 CATHETER, GUIDING: HCPCS

## 2018-07-30 PROCEDURE — 2709999900 HC NON-CHARGEABLE SUPPLY

## 2018-07-30 RX ORDER — ONDANSETRON 2 MG/ML
4 INJECTION INTRAMUSCULAR; INTRAVENOUS EVERY 6 HOURS PRN
Status: DISCONTINUED | OUTPATIENT
Start: 2018-07-30 | End: 2018-07-31 | Stop reason: HOSPADM

## 2018-07-30 RX ORDER — PSEUDOEPHEDRINE HCL 30 MG
100 TABLET ORAL DAILY
Status: DISCONTINUED | OUTPATIENT
Start: 2018-07-30 | End: 2018-07-30 | Stop reason: ALTCHOICE

## 2018-07-30 RX ORDER — DONEPEZIL HYDROCHLORIDE 5 MG/1
5 TABLET, FILM COATED ORAL NIGHTLY
Status: DISCONTINUED | OUTPATIENT
Start: 2018-07-30 | End: 2018-07-31 | Stop reason: HOSPADM

## 2018-07-30 RX ORDER — NICOTINE POLACRILEX 4 MG
15 LOZENGE BUCCAL PRN
Status: DISCONTINUED | OUTPATIENT
Start: 2018-07-30 | End: 2018-07-31 | Stop reason: HOSPADM

## 2018-07-30 RX ORDER — CLONIDINE HYDROCHLORIDE 0.1 MG/1
0.1 TABLET ORAL 3 TIMES DAILY
Status: DISCONTINUED | OUTPATIENT
Start: 2018-07-30 | End: 2018-07-31 | Stop reason: HOSPADM

## 2018-07-30 RX ORDER — MIRTAZAPINE 15 MG/1
7.5 TABLET, FILM COATED ORAL NIGHTLY
Status: DISCONTINUED | OUTPATIENT
Start: 2018-07-30 | End: 2018-07-31 | Stop reason: HOSPADM

## 2018-07-30 RX ORDER — CALCITRIOL 0.25 UG/1
0.25 CAPSULE, LIQUID FILLED ORAL EVERY OTHER DAY
Status: DISCONTINUED | OUTPATIENT
Start: 2018-07-31 | End: 2018-07-31 | Stop reason: HOSPADM

## 2018-07-30 RX ORDER — ATENOLOL 25 MG/1
12.5 TABLET ORAL DAILY
Status: DISCONTINUED | OUTPATIENT
Start: 2018-07-30 | End: 2018-07-31 | Stop reason: HOSPADM

## 2018-07-30 RX ORDER — DOCUSATE SODIUM 100 MG/1
100 CAPSULE, LIQUID FILLED ORAL 2 TIMES DAILY
Status: DISCONTINUED | OUTPATIENT
Start: 2018-07-30 | End: 2018-07-31 | Stop reason: HOSPADM

## 2018-07-30 RX ORDER — ATORVASTATIN CALCIUM 40 MG/1
80 TABLET, FILM COATED ORAL NIGHTLY
Status: DISCONTINUED | OUTPATIENT
Start: 2018-07-30 | End: 2018-07-31 | Stop reason: HOSPADM

## 2018-07-30 RX ORDER — LANOLIN ALCOHOL/MO/W.PET/CERES
400 CREAM (GRAM) TOPICAL DAILY
Status: DISCONTINUED | OUTPATIENT
Start: 2018-07-30 | End: 2018-07-31 | Stop reason: HOSPADM

## 2018-07-30 RX ORDER — SODIUM CHLORIDE 0.9 % (FLUSH) 0.9 %
10 SYRINGE (ML) INJECTION PRN
Status: DISCONTINUED | OUTPATIENT
Start: 2018-07-30 | End: 2018-07-31 | Stop reason: HOSPADM

## 2018-07-30 RX ORDER — FLUTICASONE PROPIONATE 50 MCG
1 SPRAY, SUSPENSION (ML) NASAL 2 TIMES DAILY
Status: DISCONTINUED | OUTPATIENT
Start: 2018-07-30 | End: 2018-07-31 | Stop reason: HOSPADM

## 2018-07-30 RX ORDER — FERROUS SULFATE 325(65) MG
325 TABLET ORAL
Status: DISCONTINUED | OUTPATIENT
Start: 2018-07-31 | End: 2018-07-31 | Stop reason: HOSPADM

## 2018-07-30 RX ORDER — M-VIT,TX,IRON,MINS/CALC/FOLIC 27MG-0.4MG
1 TABLET ORAL DAILY
Status: DISCONTINUED | OUTPATIENT
Start: 2018-07-30 | End: 2018-07-31 | Stop reason: HOSPADM

## 2018-07-30 RX ORDER — AMOXICILLIN AND CLAVULANATE POTASSIUM 500; 125 MG/1; MG/1
1 TABLET, FILM COATED ORAL 2 TIMES DAILY
Status: DISCONTINUED | OUTPATIENT
Start: 2018-07-30 | End: 2018-07-31 | Stop reason: HOSPADM

## 2018-07-30 RX ORDER — OXYCODONE HYDROCHLORIDE AND ACETAMINOPHEN 5; 325 MG/1; MG/1
2 TABLET ORAL EVERY 4 HOURS PRN
Status: DISCONTINUED | OUTPATIENT
Start: 2018-07-30 | End: 2018-07-31 | Stop reason: HOSPADM

## 2018-07-30 RX ORDER — DOXYCYCLINE HYCLATE 100 MG/1
100 CAPSULE ORAL 2 TIMES DAILY
Status: DISCONTINUED | OUTPATIENT
Start: 2018-07-30 | End: 2018-07-31 | Stop reason: HOSPADM

## 2018-07-30 RX ORDER — HYDRALAZINE HYDROCHLORIDE 50 MG/1
50 TABLET, FILM COATED ORAL 4 TIMES DAILY
Status: DISCONTINUED | OUTPATIENT
Start: 2018-07-30 | End: 2018-07-31 | Stop reason: HOSPADM

## 2018-07-30 RX ORDER — SODIUM CHLORIDE 9 MG/ML
INJECTION, SOLUTION INTRAVENOUS CONTINUOUS
Status: DISCONTINUED | OUTPATIENT
Start: 2018-07-30 | End: 2018-07-30 | Stop reason: ALTCHOICE

## 2018-07-30 RX ORDER — INSULIN GLARGINE 100 [IU]/ML
20 INJECTION, SOLUTION SUBCUTANEOUS NIGHTLY
Status: DISCONTINUED | OUTPATIENT
Start: 2018-07-30 | End: 2018-07-31 | Stop reason: HOSPADM

## 2018-07-30 RX ORDER — AMLODIPINE BESYLATE 10 MG/1
10 TABLET ORAL NIGHTLY
Status: DISCONTINUED | OUTPATIENT
Start: 2018-07-30 | End: 2018-07-31 | Stop reason: HOSPADM

## 2018-07-30 RX ORDER — SODIUM CHLORIDE 9 MG/ML
INJECTION, SOLUTION INTRAVENOUS CONTINUOUS
Status: DISCONTINUED | OUTPATIENT
Start: 2018-07-30 | End: 2018-07-31 | Stop reason: HOSPADM

## 2018-07-30 RX ORDER — SODIUM CHLORIDE 0.9 % (FLUSH) 0.9 %
10 SYRINGE (ML) INJECTION EVERY 12 HOURS SCHEDULED
Status: DISCONTINUED | OUTPATIENT
Start: 2018-07-30 | End: 2018-07-31 | Stop reason: HOSPADM

## 2018-07-30 RX ORDER — OXYCODONE HYDROCHLORIDE AND ACETAMINOPHEN 5; 325 MG/1; MG/1
1 TABLET ORAL EVERY 4 HOURS PRN
Status: DISCONTINUED | OUTPATIENT
Start: 2018-07-30 | End: 2018-07-31 | Stop reason: HOSPADM

## 2018-07-30 RX ORDER — ACETAMINOPHEN 325 MG/1
650 TABLET ORAL EVERY 4 HOURS PRN
Status: DISCONTINUED | OUTPATIENT
Start: 2018-07-30 | End: 2018-07-31 | Stop reason: HOSPADM

## 2018-07-30 RX ORDER — DEXTROSE MONOHYDRATE 25 G/50ML
12.5 INJECTION, SOLUTION INTRAVENOUS PRN
Status: DISCONTINUED | OUTPATIENT
Start: 2018-07-30 | End: 2018-07-31 | Stop reason: HOSPADM

## 2018-07-30 RX ORDER — FAMOTIDINE 20 MG/1
20 TABLET, FILM COATED ORAL DAILY
Status: DISCONTINUED | OUTPATIENT
Start: 2018-07-30 | End: 2018-07-31 | Stop reason: HOSPADM

## 2018-07-30 RX ORDER — CLOPIDOGREL BISULFATE 75 MG/1
75 TABLET ORAL DAILY
Status: DISCONTINUED | OUTPATIENT
Start: 2018-07-30 | End: 2018-07-31 | Stop reason: HOSPADM

## 2018-07-30 RX ORDER — PANTOPRAZOLE SODIUM 40 MG/1
40 TABLET, DELAYED RELEASE ORAL
Status: DISCONTINUED | OUTPATIENT
Start: 2018-07-30 | End: 2018-07-31 | Stop reason: HOSPADM

## 2018-07-30 RX ORDER — DEXTROSE MONOHYDRATE 50 MG/ML
100 INJECTION, SOLUTION INTRAVENOUS PRN
Status: DISCONTINUED | OUTPATIENT
Start: 2018-07-30 | End: 2018-07-31 | Stop reason: HOSPADM

## 2018-07-30 RX ADMIN — MAGNESIUM GLUCONATE 500 MG ORAL TABLET 400 MG: 500 TABLET ORAL at 15:43

## 2018-07-30 RX ADMIN — INSULIN LISPRO 4 UNITS: 100 INJECTION, SOLUTION INTRAVENOUS; SUBCUTANEOUS at 15:50

## 2018-07-30 RX ADMIN — AMOXICILLIN AND CLAVULANATE POTASSIUM 1 TABLET: 500; 125 TABLET, FILM COATED ORAL at 15:42

## 2018-07-30 RX ADMIN — HYDRALAZINE HYDROCHLORIDE 50 MG: 50 TABLET, FILM COATED ORAL at 15:42

## 2018-07-30 RX ADMIN — SODIUM CHLORIDE: 9 INJECTION, SOLUTION INTRAVENOUS at 07:30

## 2018-07-30 RX ADMIN — ATENOLOL 12.5 MG: 25 TABLET ORAL at 15:40

## 2018-07-30 RX ADMIN — CLOPIDOGREL 75 MG: 75 TABLET, FILM COATED ORAL at 15:39

## 2018-07-30 RX ADMIN — PANTOPRAZOLE SODIUM 40 MG: 40 TABLET, DELAYED RELEASE ORAL at 15:40

## 2018-07-30 RX ADMIN — MULTIPLE VITAMINS W/ MINERALS TAB 1 TABLET: TAB at 15:43

## 2018-07-30 ASSESSMENT — PAIN SCALES - GENERAL
PAINLEVEL_OUTOF10: 0

## 2018-07-30 NOTE — CARE COORDINATION
Social Work/Discharge Planning;  Pt is a resident at [facility] under skilled. Per liaison, pt is a bed hold and can return when medically ready for discharge. Will need N-N at [phone]. Social Work will follow to facilitate return.   Yulisa GIBSON Principal Discharge DX:	Palpitations

## 2018-07-30 NOTE — PROGRESS NOTES
4 Slovak arterial sheath to left and 6 Slovak arterial sheath to right removed intact per protocol by terry valentin, pt tolerated well act 153

## 2018-07-30 NOTE — OP NOTE
Date of procedure: 7/30/2018    Preoperative diagnosis: Critical limb ischemia of the lower extremities with tissue loss of the great toes    Postoperative diagnosis: Same with severe right common iliac and external iliac artery disease. Bilateral superficial femoral artery occlusions. Bilateral reconstitution of the above-knee popliteal segment. The tibial vessels were difficult to see secondary the patient not being cooperative during the case. Abdominal aorta was patent with bilateral patent renals and patent nephro grams. The iliac arteries were tortuous the bilateral common iliac arteries are widely patent. The left external iliac artery is widely patent. The left hypogastric's occluded. The right hypogastric's patent with a high-grade stenosis at the origin    Bilateral common femorals are patent bilateral profundus are patent. Surgeon: Petr Nazario    Anesthesia: Conscious sedation consisting of fentanyl and Versed as well as cardiopulmonary monitoring, local 1% lidocaine was also used    Estimated contrast: Approximately 105 mL    Estimated blood loss: Less than 20 mL    Procedure:  #1 bilateral femoral ultrasound-guided axis to the common femoral vessels  #2 abdominal aortogram with runoff  #3 balloon angioplasty of the common iliac artery  #4 balloon angioplasty of the external iliac artery  #5 placement of an 8 x 6 EV 3 self-expanding stent at the common iliac  #6 placement of an 8 x 4 self-expanding EV 3 stent at the external iliac artery    Description of the procedure: After risk benefits and alternatives were discussed with the patient at length including but not limited to bleeding, infection, arteriovenous nerve injury, myocardial infarction, respiratory failure, anesthetic reaction, emergency surgery, failure the procedure, distal embolization, contrast reaction, kidney failure, limb loss of either extremity and/or death he understood and wished to proceed.  The data procedure he was brought

## 2018-07-30 NOTE — CONSULTS
[facility] [employer] [facility] [employer] [facility] History & Physical        Chief Complaint:  Bilateral leg pain    Date of Service: Pt seen/examined in consultation on July 30, 2018    History Of Present Illness:      80 y.o. male who we are asked to see/evaluate by Elida Smith MD for medical management of II DM    Past Medical History:        Diagnosis Date    Anemia     Arthritis     Atherosclerosis of native arteries of extremity with rest pain ([address] 9/23/2015    Back pain, chronic     Cerebral artery occlusion with cerebral infarction ([address]     Cognitive communication deficit     Depression     Diabetes mellitus ([address]     Diabetic ketoacidosis without coma associated with type 2 diabetes mellitus ([address] 4/6/2016    Diabetic ulcer of right foot associated with type 2 diabetes mellitus ([address] 4/8/2016    Difficulty walking     Gastrointestinal hemorrhage     Hypertension     Neuromuscular dysfunction of bladder     Onychomycosis 7/28/6516    Other symbolic dysfunctions (CODE)     Pain in lower limb 9/23/2015    Poor memory     and ?dementia, early stage    Pulmonary nodule 4/5/2016    PVD (peripheral vascular disease) with claudication ([address] 9/23/2015    Right sided weakness 9/23/2015    Stroke ([address] 04/2016    with expressive aphasia, memory problems, b/l LE weakness, incontinence of stool and urine    Tobacco dependence 9/23/2015       Past Surgical History:        Procedure Laterality Date    BACK SURGERY      COLONOSCOPY      CYSTOSCOPY  4/8/16    Removal of Bladder Stone    ECHO COMPL W DOP COLOR FLOW  5/18/2012         ENDOSCOPY, COLON, DIAGNOSTIC      OTHER SURGICAL HISTORY  11/28/15    lap choley and cholangiogram    OTHER SURGICAL HISTORY  4/8/16    Suprapubic Catheter Insertion    [place] COLONOSCOPY FLX DX W/COLLJ SPEC WHEN PFRMD N/A 3/12/2018    COLONOSCOPY performed by Angel Loera MD at [facility] OFFICE/OUTPT VISIT,PROCEDURE ONLY Bilateral 3/19/2018 RESECTION OF BONE CURRETAGE LEFT 1ST DIGIT performed by Davis Rodas DPM at [address]  03/09/2018       Medications Prior to Admission:    Prior to Admission medications    Medication Sig Start Date End Date Taking?  Authorizing Provider   donepezil (ARICEPT) 5 MG tablet Take 5 mg by mouth nightly   Yes Historical Provider, MD   insulin glargine (BASAGLAR KWIKPEN) 100 UNIT/ML injection pen Inject 20 Units into the skin nightly   Yes Historical Provider, MD   insulin lispro (HUMALOG) 100 UNIT/ML injection vial Inject 2-10 Units into the skin 4 times daily (before meals and nightly)  Patient taking differently: Inject 4-12 Units into the skin 4 times daily (before meals and nightly) 111-200= 4 units  201-250= 6 units  251-300= 8 units  301-350= 10 units  351-400= 12 units   Notify md <70 or >400 3/21/18  Yes MITCHELL Horton   cloNIDine (CATAPRES) 0.1 MG tablet Take 1 tablet by mouth 3 times daily 3/21/18  Yes MITCHELL Horton   hydrALAZINE (APRESOLINE) 50 MG tablet Take 1 tablet by mouth 4 times daily  Patient taking differently: Take 50 mg by mouth 4 times daily Hold for sys<110 dys <60 3/21/18  Yes MITCHELL Horton   atenolol (TENORMIN) 25 MG tablet Take 0.5 tablets by mouth daily 3/22/18  Yes MITCHELL Horton   amLODIPine (NORVASC) 10 MG tablet Take 1 tablet by mouth nightly 3/21/18  Yes MITCHELL Horton   calcitRIOL (ROCALTROL) 0.25 MCG capsule Take 1 capsule by mouth every other day M / W / F 3/21/18  Yes MITCHELL Horton   pantoprazole (PROTONIX) 40 MG tablet Take 1 tablet by mouth 2 times daily (before meals) 3/21/18  Yes MITCHELL Horton   ferrous sulfate 324 (65 Fe) MG EC tablet Take 1 tablet by mouth daily (with breakfast) 3/21/18  Yes MITCHELL Horton   Multiple Vitamins-Minerals (THERAPEUTIC MULTIVITAMIN-MINERALS) tablet Take 1 tablet by mouth daily   Yes Historical Provider, MD   mirtazapine (REMERON) 15 MG normal S1/S2 without murmurs, rubs or gallops. Abdomen: Soft, non-tender, non-distended with normal bowel sounds. Musculoskeletal: No clubbing, cyanosis or edema bilaterally. Blood tinged drainage on both great toes   Skin: Skin color, texture, turgor normal.  No rashes or lesions. Neurologic:  Neurovascularly intact without any focal /motor deficits. Decreased sensation both lower extrem  Cranial nerves: II-XII intact, grossly non-focal.  Psychiatric: Alert and oriented, thought content appropriate, normal insight  Capillary Refill: Brisk,< 3 seconds   Peripheral Pulses: +2 palpable, equal bilaterally     Labs:     No results for input(s): WBC, HGB, HCT, PLT in the last 72 hours. Recent Labs      07/30/18   0830   NA  145   K  4.7   CL  106   CO2  24   BUN  32*   CREATININE  1.8*   CALCIUM  9.7     No results for input(s): AST, ALT, BILIDIR, BILITOT, ALKPHOS in the last 72 hours. No results for input(s): INR in the last 72 hours. No results for input(s): Catherine Beau in the last 72 hours.     Urinalysis:    Lab Results   Component Value Date    NITRU POSITIVE 03/11/2018    WBCUA 10-20 03/11/2018    WBCUA 0-1 10/29/2011    BACTERIA FEW 03/11/2018    RBCUA 0-1 03/11/2018    RBCUA NONE 09/12/2013    BLOODU Negative 03/11/2018    SPECGRAV 1.010 03/11/2018    GLUCOSEU Negative 03/11/2018    GLUCOSEU >=1000 10/29/2011       Radiology: I have reviewed the radiology reports with the following interpretation:     No orders to display          ASSESSMENT:    Active Hospital Problems    Diagnosis Date Noted    PAD (peripheral artery disease) ([facility] [I73.9] 07/30/2018   IIDM  HTN  Dementia  Diabetic ulcers both great toes  S/p stent  Right common iliac due to limb ischemia   HLD    PLAN:  Cont aricept  cont norvasc   cont tenormin   cont lipitor  Cont SSI    DVT Prophylaxis: lovenox  Diet: DIET CARB CONTROL;  Code Status: Full Code    PT/OT Eval Status: Active and ongoing    Dispo - SNF in am     Thank you for the

## 2018-07-30 NOTE — H&P
Removal of Bladder Stone    ECHO COMPL W DOP COLOR FLOW  5/18/2012         ENDOSCOPY, COLON, DIAGNOSTIC      OTHER SURGICAL HISTORY  11/28/15    lap choley and cholangiogram    OTHER SURGICAL HISTORY  4/8/16    Suprapubic Catheter Insertion    [place] COLONOSCOPY FLX DX W/COLLJ SPEC WHEN PFRMD N/A 3/12/2018    COLONOSCOPY performed by Bob Patel MD at [facility] OFFICE/OUTPT VISIT,PROCEDURE ONLY Bilateral 3/19/2018    RESECTION OF BONE CURRETAGE LEFT 1ST DIGIT performed by Maggi Mcginnis DPM at  [facility] ENDOSCOPY  03/09/2018       Current Medications:     Current Outpatient Prescriptions:     clopidogrel (PLAVIX) 75 MG tablet, Take 75 mg by mouth daily, Disp: , Rfl:     donepezil (ARICEPT) 5 MG tablet, Take 5 mg by mouth nightly, Disp: , Rfl:     insulin glargine (BASAGLAR KWIKPEN) 100 UNIT/ML injection pen, Inject 20 Units into the skin nightly, Disp: , Rfl:     insulin lispro (HUMALOG) 100 UNIT/ML injection vial, Inject 2-10 Units into the skin 4 times daily (before meals and nightly) (Patient taking differently: Inject 4-12 Units into the skin 4 times daily (before meals and nightly) 111-200= 4 units 201-250= 6 units 251-300= 8 units 301-350= 10 units 351-400= 12 units  Notify md <70 or >400), Disp: 1 vial, Rfl: 3    cloNIDine (CATAPRES) 0.1 MG tablet, Take 1 tablet by mouth 3 times daily, Disp: 60 tablet, Rfl: 3    hydrALAZINE (APRESOLINE) 50 MG tablet, Take 1 tablet by mouth 4 times daily (Patient taking differently: Take 50 mg by mouth 4 times daily Hold for sys<110 dys <60), Disp: 90 tablet, Rfl: 3    atenolol (TENORMIN) 25 MG tablet, Take 0.5 tablets by mouth daily, Disp: 30 tablet, Rfl: 3    amLODIPine (NORVASC) 10 MG tablet, Take 1 tablet by mouth nightly, Disp: 30 tablet, Rfl: 3    calcitRIOL (ROCALTROL) 0.25 MCG capsule, Take 1 capsule by mouth every other day M / W / F, Disp: 30 capsule, Rfl: 3    pantoprazole (PROTONIX) 40 MG tablet, Take 1 tablet by mouth 2 times daily (before meals), Disp: 30 tablet, Rfl: 0    ferrous sulfate 324 (65 Fe) MG EC tablet, Take 1 tablet by mouth daily (with breakfast), Disp: 30 tablet, Rfl:     Multiple Vitamins-Minerals (THERAPEUTIC MULTIVITAMIN-MINERALS) tablet, Take 1 tablet by mouth daily, Disp: , Rfl:     mirtazapine (REMERON) 15 MG tablet, Take 7.5 mg by mouth nightly, Disp: , Rfl:     magnesium oxide (MAG-OX) 400 (240 MG) MG tablet, Take 1 tablet by mouth daily, Disp: , Rfl:     atorvastatin (LIPITOR) 80 MG tablet, Take 1 tablet by mouth nightly, Disp: 30 tablet, Rfl: 3    traMADol (ULTRAM) 50 MG tablet, Take 50 mg by mouth every 8 hours as needed for Pain. ., Disp: , Rfl:     amoxicillin-clavulanate (AUGMENTIN) 500-125 MG per tablet, Take 1 tablet by mouth 2 times daily, Disp: , Rfl:     doxycycline monohydrate (MONODOX) 100 MG capsule, Take 100 mg by mouth 2 times daily, Disp: , Rfl:     docusate sodium (COLACE, DULCOLAX) 100 MG CAPS, Take 100 mg by mouth daily, Disp: , Rfl:     fluticasone (FLONASE) 50 MCG/ACT nasal spray, 1 spray by Nasal route 2 times daily, Disp: , Rfl:     Dextromethorphan-guaiFENesin  MG/5ML SYRP, Take 15 mLs by mouth every 4 hours as needed for Cough, Disp: , Rfl:     acetaminophen 650 MG TABS, Take 650 mg by mouth 3 times daily as needed, Disp: 120 tablet, Rfl: 3    ammonium lactate (LAC-HYDRIN) 12 % lotion, Apply to affected areas as needed, Disp: 1 Bottle, Rfl: 3    Current Facility-Administered Medications:     0.9 % sodium chloride infusion, , Intravenous, Continuous, Rashad Montanez MD, Last Rate: 75 mL/hr at 07/30/18 0730    Allergies:  Patient has no known allergies. Social History     Social History    Marital status: Single     Spouse name: N/A    Number of children: N/A    Years of education: N/A     Occupational History    Not on file.      Social History Main Topics    Smoking status: Current Every Day Smoker     Packs/day: 0.10     Years: 50.00 Component Value Date    WBC 5.2 03/21/2018    HGB 9.9 (L) 03/21/2018    HCT 31.8 (L) 03/21/2018     03/21/2018    PROTIME 11.8 03/15/2018    INR 1.0 03/15/2018    APTT 45.0 (H) 03/11/2018    K 4.7 07/30/2018    BUN 32 (H) 07/30/2018    CREATININE 1.8 (H) 07/30/2018       RADIOLOGY:  I personally reviewed the CT angiography. He has inflow disease on the right side in particular. On the left side is a palpable pulse. He has tortuous atherosclerotic vessels abdominal aorta. He has bilateral superficial femoral artery disease worse on the right than the left. His popliteals also appeared to be disease I can't really tell how diseases trifurcation is but he does have calcium it's noted in the tibial vessels at this level and distally. IMPRESSION:   #1 severe peripheral vascular disease with tissue loss. At this point were planning arteriogram with possible intervention. I told him that this most likely will have to be a stage procedure secondary to the fact that he has significant bilateral extremity disease. The goal at this point would be to establish hopefully inflow to see if this improves at all. Also we wanted get additional images of the lower extremities at the below-knee segments to contemplate any further intervention if needed. PLAN:  Arteriogram with possible intervention. Risk benefits and alternatives were discussed with the patient including but not limited to bleeding, infection, arteriovenous nerve injury, myocardial infarction, respiratory failure, anesthetic reaction, kidney failure, contrast reaction, emergency surgery, limb loss of either extremity and/or death. He understands and wishes to proceed. He has been given preoperative hydration. He will be given postoperative hydration as well.       Electronically signed by Zahraa Carmichael MD on 7/30/2018 at 9:38 AM

## 2018-07-31 VITALS
HEART RATE: 80 BPM | HEIGHT: 72 IN | OXYGEN SATURATION: 97 % | BODY MASS INDEX: 20.99 KG/M2 | TEMPERATURE: 98.1 F | DIASTOLIC BLOOD PRESSURE: 64 MMHG | WEIGHT: 155 LBS | RESPIRATION RATE: 16 BRPM | SYSTOLIC BLOOD PRESSURE: 131 MMHG

## 2018-07-31 PROBLEM — N18.30 CKD (CHRONIC KIDNEY DISEASE) STAGE 3, GFR 30-59 ML/MIN (HCC): Chronic | Status: ACTIVE | Noted: 2018-07-31

## 2018-07-31 LAB
ANION GAP SERPL CALCULATED.3IONS-SCNC: 14 MMOL/L (ref 7–16)
BUN BLDV-MCNC: 27 MG/DL (ref 8–23)
CALCIUM SERPL-MCNC: 9 MG/DL (ref 8.6–10.2)
CHLORIDE BLD-SCNC: 102 MMOL/L (ref 98–107)
CO2: 21 MMOL/L (ref 22–29)
CREAT SERPL-MCNC: 1.6 MG/DL (ref 0.7–1.2)
GFR AFRICAN AMERICAN: 52
GFR NON-AFRICAN AMERICAN: 52 ML/MIN/1.73
GLUCOSE BLD-MCNC: 114 MG/DL (ref 74–109)
HCT VFR BLD CALC: 27.1 % (ref 37–54)
HEMOGLOBIN: 8.6 G/DL (ref 12.5–16.5)
MCH RBC QN AUTO: 32.5 PG (ref 26–35)
MCHC RBC AUTO-ENTMCNC: 31.7 % (ref 32–34.5)
MCV RBC AUTO: 102.3 FL (ref 80–99.9)
METER GLUCOSE: 162 MG/DL (ref 70–110)
METER GLUCOSE: 91 MG/DL (ref 70–110)
METER GLUCOSE: 99 MG/DL (ref 70–110)
PDW BLD-RTO: 14 FL (ref 11.5–15)
PLATELET # BLD: 541 E9/L (ref 130–450)
PMV BLD AUTO: 8.6 FL (ref 7–12)
POTASSIUM SERPL-SCNC: 4.9 MMOL/L (ref 3.5–5)
RBC # BLD: 2.65 E12/L (ref 3.8–5.8)
SODIUM BLD-SCNC: 137 MMOL/L (ref 132–146)
WBC # BLD: 6.7 E9/L (ref 4.5–11.5)

## 2018-07-31 PROCEDURE — 6370000000 HC RX 637 (ALT 250 FOR IP): Performed by: SURGERY

## 2018-07-31 PROCEDURE — 82962 GLUCOSE BLOOD TEST: CPT

## 2018-07-31 PROCEDURE — 99232 SBSQ HOSP IP/OBS MODERATE 35: CPT | Performed by: SURGERY

## 2018-07-31 PROCEDURE — 80048 BASIC METABOLIC PNL TOTAL CA: CPT

## 2018-07-31 PROCEDURE — 85027 COMPLETE CBC AUTOMATED: CPT

## 2018-07-31 PROCEDURE — 36415 COLL VENOUS BLD VENIPUNCTURE: CPT

## 2018-07-31 PROCEDURE — 6360000002 HC RX W HCPCS: Performed by: SURGERY

## 2018-07-31 PROCEDURE — 2580000003 HC RX 258: Performed by: SURGERY

## 2018-07-31 RX ORDER — CLOPIDOGREL BISULFATE 75 MG/1
75 TABLET ORAL DAILY
Qty: 30 TABLET | Refills: 3 | Status: ON HOLD | DISCHARGE
Start: 2018-08-01 | End: 2020-01-01 | Stop reason: HOSPADM

## 2018-07-31 RX ADMIN — CLONIDINE HYDROCHLORIDE 0.1 MG: 0.1 TABLET ORAL at 14:52

## 2018-07-31 RX ADMIN — PANTOPRAZOLE SODIUM 40 MG: 40 TABLET, DELAYED RELEASE ORAL at 07:55

## 2018-07-31 RX ADMIN — DONEPEZIL HYDROCHLORIDE 5 MG: 5 TABLET, FILM COATED ORAL at 00:33

## 2018-07-31 RX ADMIN — CLONIDINE HYDROCHLORIDE 0.1 MG: 0.1 TABLET ORAL at 09:17

## 2018-07-31 RX ADMIN — SODIUM CHLORIDE: 9 INJECTION, SOLUTION INTRAVENOUS at 09:05

## 2018-07-31 RX ADMIN — Medication 10 ML: at 09:16

## 2018-07-31 RX ADMIN — HYDRALAZINE HYDROCHLORIDE 50 MG: 50 TABLET, FILM COATED ORAL at 09:16

## 2018-07-31 RX ADMIN — CLOPIDOGREL 75 MG: 75 TABLET, FILM COATED ORAL at 09:16

## 2018-07-31 RX ADMIN — ENOXAPARIN SODIUM 40 MG: 100 INJECTION SUBCUTANEOUS at 09:16

## 2018-07-31 RX ADMIN — MIRTAZAPINE 7.5 MG: 15 TABLET, FILM COATED ORAL at 00:32

## 2018-07-31 RX ADMIN — SODIUM CHLORIDE: 9 INJECTION, SOLUTION INTRAVENOUS at 00:41

## 2018-07-31 RX ADMIN — DOXYCYCLINE HYCLATE 100 MG: 100 CAPSULE, GELATIN COATED ORAL at 00:33

## 2018-07-31 RX ADMIN — ATORVASTATIN CALCIUM 80 MG: 40 TABLET, FILM COATED ORAL at 00:32

## 2018-07-31 RX ADMIN — CALCITRIOL 0.25 MCG: 0.25 CAPSULE, LIQUID FILLED ORAL at 09:17

## 2018-07-31 RX ADMIN — CLONIDINE HYDROCHLORIDE 0.1 MG: 0.1 TABLET ORAL at 00:31

## 2018-07-31 RX ADMIN — AMOXICILLIN AND CLAVULANATE POTASSIUM 1 TABLET: 500; 125 TABLET, FILM COATED ORAL at 09:18

## 2018-07-31 RX ADMIN — DOXYCYCLINE HYCLATE 100 MG: 100 CAPSULE, GELATIN COATED ORAL at 09:18

## 2018-07-31 RX ADMIN — AMLODIPINE BESYLATE 10 MG: 10 TABLET ORAL at 00:32

## 2018-07-31 RX ADMIN — FAMOTIDINE 20 MG: 20 TABLET, FILM COATED ORAL at 09:18

## 2018-07-31 RX ADMIN — MULTIPLE VITAMINS W/ MINERALS TAB 1 TABLET: TAB at 09:16

## 2018-07-31 RX ADMIN — FLUTICASONE PROPIONATE 1 SPRAY: 50 SPRAY, METERED NASAL at 09:19

## 2018-07-31 RX ADMIN — ATENOLOL 12.5 MG: 25 TABLET ORAL at 09:17

## 2018-07-31 RX ADMIN — MAGNESIUM GLUCONATE 500 MG ORAL TABLET 400 MG: 500 TABLET ORAL at 09:16

## 2018-07-31 RX ADMIN — HYDRALAZINE HYDROCHLORIDE 50 MG: 50 TABLET, FILM COATED ORAL at 12:40

## 2018-07-31 RX ADMIN — INSULIN LISPRO 4 UNITS: 100 INJECTION, SOLUTION INTRAVENOUS; SUBCUTANEOUS at 16:43

## 2018-07-31 RX ADMIN — FLUTICASONE PROPIONATE 1 SPRAY: 50 SPRAY, METERED NASAL at 00:36

## 2018-07-31 RX ADMIN — DOCUSATE SODIUM 100 MG: 100 CAPSULE, LIQUID FILLED ORAL at 09:16

## 2018-07-31 RX ADMIN — HYDRALAZINE HYDROCHLORIDE 50 MG: 50 TABLET, FILM COATED ORAL at 00:33

## 2018-07-31 RX ADMIN — FERROUS SULFATE TAB 325 MG (65 MG ELEMENTAL FE) 325 MG: 325 (65 FE) TAB at 09:16

## 2018-07-31 RX ADMIN — INSULIN LISPRO 4 UNITS: 100 INJECTION, SOLUTION INTRAVENOUS; SUBCUTANEOUS at 07:56

## 2018-07-31 ASSESSMENT — PAIN SCALES - GENERAL
PAINLEVEL_OUTOF10: 0

## 2018-07-31 NOTE — CARE COORDINATION
SOCIAL WORK/DISCHARGE PLANNING;  Pt has been discharged and will return to [place] at an [address] [place] [address]. Spoke with pt and he is agreeable. I asked pt if he wanted family notified and he indicated that they aren't really involved. Per [place] liaison, their Zack  will pick pt up at 4pm. Nursing notified.   Kenan GIBSON

## 2018-07-31 NOTE — PROGRESS NOTES
Vascular Surgery Progress Note    Pt is being seen in f/u today regarding arteriogram and intervention. Subjective:  Shalini Vaughn. is a 80 y.o. male status post arteriogram and intervention with right iliac angioplasty and stent placement. This is secondary to critical limb ischemia of the bilateral lower extremities. Unfortunately secondary to his discomfort where unable to completely image below the level of the knees on both extremities. He has diffuse superficial femoral artery disease with reconstitution at the above-knee popliteal segment Rajiv distally is difficult to see. He underwent a successful inflow procedure on the right side with balloon angioplasty and stenting of the external and common iliac vessel. He denies any pain or discomfort since his right lower extremity feels significantly better after the procedure.     Current Medications:    sodium chloride 125 mL/hr at 07/31/18 0430    dextrose        sodium chloride flush, acetaminophen, oxyCODONE-acetaminophen **OR** oxyCODONE-acetaminophen, ondansetron, glucose, dextrose, glucagon (rDNA), dextrose    sodium chloride flush  10 mL Intravenous 2 times per day    docusate sodium  100 mg Oral BID    famotidine  20 mg Oral Daily    enoxaparin  40 mg Subcutaneous Daily    clopidogrel  75 mg Oral Daily    atorvastatin  80 mg Oral Nightly    magnesium oxide  400 mg Oral Daily    mirtazapine  7.5 mg Oral Nightly    fluticasone  1 spray Nasal BID    therapeutic multivitamin-minerals  1 tablet Oral Daily    insulin lispro  4-12 Units Subcutaneous 4x Daily AC & HS    cloNIDine  0.1 mg Oral TID    hydrALAZINE  50 mg Oral 4x Daily    atenolol  12.5 mg Oral Daily    amLODIPine  10 mg Oral Nightly    calcitRIOL  0.25 mcg Oral Every Other Day    pantoprazole  40 mg Oral BID AC    ferrous sulfate  325 mg Oral Daily with breakfast    doxycycline hyclate  100 mg Oral BID    donepezil  5 mg Oral Nightly    amoxicillin-clavulanate  1 tablet Oral BID    insulin glargine  20 Units Subcutaneous Nightly        PHYSICAL EXAM:    BP (!) 147/72   Pulse 70   Temp 98.1 °F (36.7 °C) (Oral)   Resp 16   Ht 6' (1.829 m)   Wt 155 lb (70.3 kg)   SpO2 95%   BMI 21.02 kg/m²     Intake/Output Summary (Last 24 hours) at 07/31/18 0839  Last data filed at 07/31/18 3976   Gross per 24 hour   Intake             1857 ml   Output             1750 ml   Net              107 ml          General: Alert and oriented answers questions properly he is in no acute distress   Skin: Access sites demonstrate no evidence of hematoma. Palpable pulses are noted and equal and symmetric  HEENT: Normocephalic atraumatic trachea is midline  CVS: Currently regular rate and rhythm  Resp: No labored breathing   Abd: Soft nontender no rebound or guarding positive indwelling suprapubic catheter  Extremities: Bilateral palpable femoral pulses. This is a significant improvement from the prior examination the right side had a signal before now is symmetric with the contralateral left side motor and sensation are intact distally with signals present in the DP and PT unchanged  Neuro: Grossly intact bilaterally    LABS:    Lab Results   Component Value Date    WBC 6.7 07/31/2018    HGB 8.6 (L) 07/31/2018    HCT 27.1 (L) 07/31/2018     (H) 07/31/2018    PROTIME 11.8 03/15/2018    INR 1.0 03/15/2018    APTT 45.0 (H) 03/11/2018    K 4.9 07/31/2018    BUN 27 (H) 07/31/2018    CREATININE 1.6 (H) 07/31/2018       RADIOLOGY:  No orders to display       ASSESSMENT/PLAN:   · #1 status post arteriogram with intervention of the right iliac artery common and external. At this point he's done well he can be discharged back to his facility will bring him back on an elective basis for re-intervention of his right and possibly left lower extremity. He has a significantly tortuous external iliac and common iliac system which may require antegrade punctures.         Electronically signed by Gilberto Shelley

## 2018-07-31 NOTE — PROGRESS NOTES
Date    LABURIC 5.8 03/13/2018     Lab Results   Component Value Date    AMMONIA 18.0 08/11/2016       Assessment:    Active Hospital Problems    Diagnosis Date Noted    DM2 (diabetes mellitus, type 2) ([facility] [E11.9] 03/07/2018     Priority: Medium    Possible dementia [F03.90] 03/07/2018     Priority: Low    CKD (chronic kidney disease) stage 3, GFR 30-59 ml/min [N18.3] 07/31/2018    PAD [facility]peripheral [facility] disease) [facility] [I73.9] 07/30/2018    HTN (hypertension) [I10] 01/16/2017    Depression [F32.9] 01/10/2015    Neuropathy ([facility] [G62.9] 09/12/2013   Dementia  Diabetic ulcers both great toes  HLD     PLAN:  Cont aricept  cont norvasc   cont tenormin   cont lipitor  Cont SSI     DVT Prophylaxis: lovenox  Diet: DIET CARB CONTROL;  Code Status: Full Code     PT/OT Eval Status: Active and ongoing     Dispo - SNF           Electronically signed by Ladi Enamorado DO on 7/31/2018 at 2:44 PM

## 2018-08-02 ENCOUNTER — TELEPHONE (OUTPATIENT)
Dept: VASCULAR SURGERY | Age: 68
End: 2018-08-02

## 2018-08-02 NOTE — TELEPHONE ENCOUNTER
Scheduled abdominal aortogram + anesthesia, possible intervention with Dr. Jack Romano 8/20. Hayley Valadez at [place] notified and instructed.

## 2018-08-03 ENCOUNTER — HOSPITAL ENCOUNTER (OUTPATIENT)
Dept: WOUND CARE | Age: 68
Discharge: HOME | End: 2018-08-03
Payer: MEDICAID

## 2018-08-10 ENCOUNTER — HOSPITAL ENCOUNTER (OUTPATIENT)
Dept: WOUND CARE | Age: 68
Discharge: HOME | End: 2018-08-10
Payer: MEDICAID

## 2018-08-10 VITALS
WEIGHT: 155 LBS | HEIGHT: 72 IN | DIASTOLIC BLOOD PRESSURE: 78 MMHG | TEMPERATURE: 97.5 F | HEART RATE: 72 BPM | RESPIRATION RATE: 18 BRPM | SYSTOLIC BLOOD PRESSURE: 122 MMHG | BODY MASS INDEX: 20.99 KG/M2

## 2018-08-10 DIAGNOSIS — L98.494: Chronic | ICD-10-CM

## 2018-08-10 DIAGNOSIS — L97.516 DIABETIC ULCER OF TOE OF RIGHT FOOT ASSOCIATED WITH TYPE 2 DIABETES MELLITUS, WITH BONE INVOLVEMENT WITHOUT EVIDENCE OF NECROSIS (HCC): Primary | Chronic | ICD-10-CM

## 2018-08-10 DIAGNOSIS — E11.621 DIABETIC ULCER OF TOE OF RIGHT FOOT ASSOCIATED WITH TYPE 2 DIABETES MELLITUS, WITH BONE INVOLVEMENT WITHOUT EVIDENCE OF NECROSIS (HCC): Primary | Chronic | ICD-10-CM

## 2018-08-10 PROCEDURE — 11042 DBRDMT SUBQ TIS 1ST 20SQCM/<: CPT

## 2018-08-10 NOTE — PROGRESS NOTES
weakness 9/23/2015    Stroke ([facility] 04/2016    with expressive aphasia, memory problems, b/l LE weakness, incontinence of stool and urine    Tobacco dependence 9/23/2015     Past Surgical History:   Procedure Laterality Date    BACK SURGERY      COLONOSCOPY      CYSTOSCOPY  4/8/16    Removal of Bladder Stone    ECHO COMPL W DOP COLOR FLOW  5/18/2012         ENDOSCOPY, COLON, DIAGNOSTIC      OTHER SURGICAL HISTORY  11/28/15    lap choley and cholangiogram    OTHER SURGICAL HISTORY  4/8/16    Suprapubic Catheter Insertion    [place] COLONOSCOPY FLX DX W/COLLJ SPEC WHEN PFRMD N/A 3/12/2018    COLONOSCOPY performed by Bob Patel MD at [place] [address] OFFICE/OUTPT VISIT,PROCEDURE ONLY Bilateral 3/19/2018    RESECTION OF BONE CURRETAGE LEFT 1ST DIGIT performed by Maggi Mcginnis DPM at [facility] OR    UPPER GASTROINTESTINAL ENDOSCOPY  03/09/2018     Family History   Problem Relation Age of Onset    Heart Disease Mother      Social History   Substance Use Topics    Smoking status: Current Every Day Smoker     Packs/day: 0.10     Years: 50.00     Types: Cigarettes    Smokeless tobacco: Never Used    Alcohol use No     No Known Allergies  Current Outpatient Prescriptions on File Prior to Encounter   Medication Sig Dispense Refill    clopidogrel (PLAVIX) 75 MG tablet Take 1 tablet by mouth daily 30 tablet 3    donepezil (ARICEPT) 5 MG tablet Take 5 mg by mouth nightly      amoxicillin-clavulanate (AUGMENTIN) 500-125 MG per tablet Take 1 tablet by mouth 2 times daily      insulin glargine (BASAGLAR KWIKPEN) 100 UNIT/ML injection pen Inject 20 Units into the skin nightly      doxycycline monohydrate (MONODOX) 100 MG capsule Take 100 mg by mouth 2 times daily      insulin lispro (HUMALOG) 100 UNIT/ML injection vial Inject 2-10 Units into the skin 4 times daily (before meals and nightly) (Patient taking differently: Inject 4-12 Units into the skin 4 times daily (before meals and nightly) 111-200= 4 AM   Non-staged Wound Description Full thickness 6/12/2018 10:03 AM   Debridement per physician None 6/22/2018  9:37 AM   Time out Yes 8/10/2018 12:27 PM   Procedural Pain 0 6/22/2018  9:37 AM   Post procedural Pain 0 6/22/2018  9:37 AM   Number of days: 63       Wound 06/08/18 Other (Comment) Toe (Comment  which one) Right;Distal #2 aquired 5-1-18 diabetic ulcer great toe (Active)   Wound Image   8/10/2018 11:58 AM   Wound Type Wound 6/12/2018 10:03 AM   Wound Diabetic 7/31/2018  9:16 AM   Dressing Status Intact 7/31/2018 12:40 PM   Dressing Changed Changed/New 7/20/2018 12:15 PM   Dressing/Treatment Dry dressing 7/20/2018 12:15 PM   Wound Cleansed Rinsed/Irrigated with saline 7/20/2018 12:15 PM   Wound Length (cm) 1.2 cm 8/10/2018 12:27 PM   Wound Width (cm) 1.5 cm 8/10/2018 12:27 PM   Wound Depth (cm)  0.5 8/10/2018 12:27 PM   Calculated Wound Size (cm^2) (l*w) 1.8 cm^2 8/10/2018 12:27 PM   Change in Wound Size % (l*w) 69.9 8/10/2018 12:27 PM   Wound Assessment Pale;Pink 8/10/2018 11:58 AM   Drainage Amount Small 8/10/2018 11:58 AM   Drainage Description Serosanguinous 8/10/2018 11:58 AM   Odor None 8/10/2018 11:58 AM   Exposed structure Bone 7/13/2018 11:59 AM   Louise-wound Assessment Calloused 8/10/2018 11:58 AM   Non-staged Wound Description Full thickness 6/12/2018 10:03 AM   Culture Taken Yes 6/8/2018 10:49 AM   Debridement per physician None 6/12/2018 10:40 AM   Time out Yes 8/10/2018 12:27 PM   Procedural Pain 0 6/22/2018  9:37 AM   Post procedural Pain 0 6/22/2018  9:37 AM   Number of days: 63     Percent of Wound/Ulcer Debrided: 100%    Total Surface Area Debrided:  2 sq cm     Estimated Blood Loss:  Minimal  Hemostasis Achieved:  by pressure    Procedural Pain:  2  / 10   Post Procedural Pain:  3 / 10     Response to treatment:  Well tolerated by patient.      Plan:   Treatment Note please see attached Discharge Instructions    Written patient dismissal instructions given to patient and signed by patient

## 2018-08-13 ENCOUNTER — PREP FOR PROCEDURE (OUTPATIENT)
Dept: GASTROENTEROLOGY | Age: 68
End: 2018-08-13

## 2018-08-17 ENCOUNTER — HOSPITAL ENCOUNTER (OUTPATIENT)
Dept: WOUND CARE | Age: 68
Discharge: HOME | End: 2018-08-17
Payer: MEDICAID

## 2018-08-17 VITALS
WEIGHT: 155 LBS | RESPIRATION RATE: 18 BRPM | HEART RATE: 60 BPM | BODY MASS INDEX: 20.99 KG/M2 | HEIGHT: 72 IN | SYSTOLIC BLOOD PRESSURE: 130 MMHG | DIASTOLIC BLOOD PRESSURE: 78 MMHG | TEMPERATURE: 97.5 F

## 2018-08-17 PROCEDURE — 11042 DBRDMT SUBQ TIS 1ST 20SQCM/<: CPT

## 2018-08-17 NOTE — PROGRESS NOTES
[facility] Followup Visit Note    Referring Physician : Phill Farah DO  Elizabeth Sanderson. MEDICAL RECORD NUMBER:  [id number]  AGE: 80 y.o. GENDER: male  : 1950  EPISODE DATE:  2018    Subjective:     Chief Complaint   Patient presents with    Wound Check     Left and rt great toes      HISTORY of PRESENT ILLNESS HPI   Elizabeth Cabrales is a 80 y.o. male who presents today for wound/ulcer evaluation.    History of Wound Context:  2 months     Wound/Ulcer Pain Timing/Severity: intermittent  Quality of pain: aching  Severity:  3 / 10   Modifying Factors: Pain worsens with walking  Associated Signs/Symptoms: edema, erythema and drainage    Ulcer Identification:  Ulcer Type: diabetic  Contributing Factors: edema, venous stasis, lymphedema, poor glucose control, chronic pressure and arterial insufficiency    Diabetic/Pressure/Non Pressure Ulcers only:  Ulcer: diabetic ulcers with bone exposed     Wound: N/A        PAST MEDICAL HISTORY      Diagnosis Date    Anemia     Arthritis     Atherosclerosis of native arteries of extremity with rest pain (HCC) 2015    Back pain, chronic     Cerebral artery occlusion with cerebral infarction ([address]     CKD (chronic kidney disease) stage 3, GFR 30-59 ml/min 2018    Cognitive communication deficit     Depression     Diabetes mellitus ([address]     Diabetic ketoacidosis without coma associated with type 2 diabetes mellitus ([address] 2016    Diabetic ulcer of right foot associated with type 2 diabetes mellitus ([address] 2016    Difficulty walking     Gastrointestinal hemorrhage     Hypertension     Neuromuscular dysfunction of bladder     Onychomycosis 2015    Osteomyelitis of toe of left foot ([address]     Osteomyelitis of toe of right foot ([address] 4685    Other symbolic dysfunctions (CODE)     Pain in lower limb 2015    Poor memory     and ?dementia, early stage    Pulmonary nodule 2016    PVD (peripheral vascular 6/29/2018 12:20 PM   Louise-wound Assessment Intact;Dry 8/17/2018 10:59 AM   Non-staged Wound Description Full thickness 6/12/2018 10:03 AM   Debridement per physician None 6/22/2018  9:37 AM   Time out Yes 8/10/2018 12:27 PM   Procedural Pain 0 6/22/2018  9:37 AM   Post procedural Pain 0 6/22/2018  9:37 AM   Number of days: 70       Wound 06/08/18 Other (Comment) Toe (Comment  which one) Right;Distal #2 aquired 5-1-18 diabetic ulcer great toe (Active)   Wound Image   8/10/2018 11:58 AM   Wound Type Wound 6/12/2018 10:03 AM   Wound Diabetic 7/31/2018  9:16 AM   Dressing Status Intact 8/10/2018 12:40 PM   Dressing Changed Changed/New 8/10/2018 12:40 PM   Dressing/Treatment Dry dressing;Silver dressing 8/10/2018 12:40 PM   Wound Cleansed Wound cleanser 8/17/2018 10:59 AM   Wound Length (cm) 1 cm 8/17/2018 10:59 AM   Wound Width (cm) 1.3 cm 8/17/2018 10:59 AM   Wound Depth (cm)  0.4 8/17/2018 10:59 AM   Calculated Wound Size (cm^2) (l*w) 1.3 cm^2 8/17/2018 10:59 AM   Change in Wound Size % (l*w) 78.26 8/17/2018 10:59 AM   Wound Assessment White;Red 8/17/2018 10:59 AM   Drainage Amount Small 8/17/2018 10:59 AM   Drainage Description Serosanguinous 8/17/2018 10:59 AM   Odor None 8/17/2018 10:59 AM   Exposed structure Bone 8/17/2018 10:59 AM   Louise-wound Assessment Dry 8/17/2018 10:59 AM   Non-staged Wound Description Full thickness 6/12/2018 10:03 AM   Culture Taken Yes 6/8/2018 10:49 AM   Debridement per physician None 6/12/2018 10:40 AM   Time out Yes 8/10/2018 12:27 PM   Procedural Pain 0 6/22/2018  9:37 AM   Post procedural Pain 0 6/22/2018  9:37 AM   Number of days: 70     Percent of Wound/Ulcer Debrided: 100%    Total Surface Area Debrided:  2 sq cm     Estimated Blood Loss:  Minimal  Hemostasis Achieved:  by pressure    Procedural Pain:  2  / 10   Post Procedural Pain:  3 / 10     Response to treatment:  Well tolerated by patient.      Plan:   Treatment Note please see attached Discharge Instructions    Written patient dismissal instructions given to patient and signed by patient or POA. Discharge Instructions       Visit Discharge/Physician Orders     Discharge condition: Stable     Assessment of pain at discharge:no     Anesthetic used: 2% lidocaine gel     Discharge to: Home     Left via:Private automobile     Accompanied by: accompanied by spouse     ECF/[facility]: [facility]     Dressing Orders:CLEANSE RIGHT HEEL ULCER ,RIGHT PRETIB AND POSTERIOR LEG ULCERS WITH WOUND CLEANSER ,VASHE , APPLY AQUACEL AG,   ZINC OXIDE TO EXCORIATED AREA AS NEEDED,  COVER WITH DRY DRESSING, ABD PADS, KERLEX , SECURE. 3 X WEEK AND AS NEEDED     Treatment Orders:NO PRESSURE TO RIGHT FOOT, DO NOT GET WET      NO TUBIGRIPS     PATIENT TO GET XRAYS BILATERAL GREAT TOES. PLAN:  POSSIBLE SURGERY FOR BONE REMOVAL AND CONSULTING DR. THOMAS FOR INFECTIOUS DISEASE     [facility] followup visit:___________1 WEEK DR VELEZ___________________________  (Please note your next appointment above and if you are unable to keep, kindly give a 24 hour notice. Thank you.)    Physician signature:__________________________      If you experience any of the following, please call the [facility] during business hours:    * Increase in Pain  * Temperature over 101  * Increase in drainage from your wound  * Drainage with a foul odor  * Bleeding  * Increase in swelling  * Need for compression bandage changes due to slippage, breakthrough drainage. If you need medical attention outside of the business hours of the [facility] please contact your PCP or go to the nearest emergency room.         Electronically signed by Nile Barber DPM on 8/17/2018 at 11:27 AM

## 2018-08-20 ENCOUNTER — ANESTHESIA (OUTPATIENT)
Dept: CARDIAC CATH/INVASIVE PROCEDURES | Age: 68
End: 2018-08-20

## 2018-08-20 ENCOUNTER — HOSPITAL ENCOUNTER (OUTPATIENT)
Dept: CARDIAC CATH/INVASIVE PROCEDURES | Age: 68
Setting detail: OBSERVATION
Discharge: NURSING, REHAB OR LONG-TERM CARE | End: 2018-08-22
Attending: SURGERY | Admitting: SURGERY
Payer: MEDICAID

## 2018-08-20 ENCOUNTER — ANESTHESIA EVENT (OUTPATIENT)
Dept: CARDIAC CATH/INVASIVE PROCEDURES | Age: 68
End: 2018-08-20

## 2018-08-20 DIAGNOSIS — I73.9 PAD (PERIPHERAL ARTERY DISEASE) (HCC): ICD-10-CM

## 2018-08-20 LAB
ANION GAP SERPL CALCULATED.3IONS-SCNC: 14 MMOL/L (ref 7–16)
BUN BLDV-MCNC: 35 MG/DL (ref 8–23)
CALCIUM SERPL-MCNC: 9 MG/DL (ref 8.6–10.2)
CHLORIDE BLD-SCNC: 105 MMOL/L (ref 98–107)
CO2: 27 MMOL/L (ref 22–29)
CREAT SERPL-MCNC: 1.8 MG/DL (ref 0.7–1.2)
GFR AFRICAN AMERICAN: 46
GFR NON-AFRICAN AMERICAN: 46 ML/MIN/1.73
GLUCOSE BLD-MCNC: 94 MG/DL (ref 74–109)
HCT VFR BLD CALC: 24 % (ref 37–54)
HEMOGLOBIN: 7.8 G/DL (ref 12.5–16.5)
MCH RBC QN AUTO: 32.8 PG (ref 26–35)
MCHC RBC AUTO-ENTMCNC: 32.5 % (ref 32–34.5)
MCV RBC AUTO: 100.8 FL (ref 80–99.9)
METER GLUCOSE: 215 MG/DL (ref 70–110)
METER GLUCOSE: 243 MG/DL (ref 70–110)
PDW BLD-RTO: 14.6 FL (ref 11.5–15)
PLATELET # BLD: 481 E9/L (ref 130–450)
PMV BLD AUTO: 8.6 FL (ref 7–12)
POTASSIUM SERPL-SCNC: 4.4 MMOL/L (ref 3.5–5)
RBC # BLD: 2.38 E12/L (ref 3.8–5.8)
SODIUM BLD-SCNC: 146 MMOL/L (ref 132–146)
WBC # BLD: 6.4 E9/L (ref 4.5–11.5)

## 2018-08-20 PROCEDURE — 6360000002 HC RX W HCPCS: Performed by: SURGERY

## 2018-08-20 PROCEDURE — 80048 BASIC METABOLIC PNL TOTAL CA: CPT

## 2018-08-20 PROCEDURE — 96372 THER/PROPH/DIAG INJ SC/IM: CPT

## 2018-08-20 PROCEDURE — 2580000003 HC RX 258: Performed by: SURGERY

## 2018-08-20 PROCEDURE — G0378 HOSPITAL OBSERVATION PER HR: HCPCS

## 2018-08-20 PROCEDURE — 36415 COLL VENOUS BLD VENIPUNCTURE: CPT

## 2018-08-20 PROCEDURE — 85027 COMPLETE CBC AUTOMATED: CPT

## 2018-08-20 PROCEDURE — 82962 GLUCOSE BLOOD TEST: CPT

## 2018-08-20 PROCEDURE — 6370000000 HC RX 637 (ALT 250 FOR IP): Performed by: SURGERY

## 2018-08-20 RX ORDER — DEXTROSE MONOHYDRATE 25 G/50ML
12.5 INJECTION, SOLUTION INTRAVENOUS PRN
Status: DISCONTINUED | OUTPATIENT
Start: 2018-08-20 | End: 2018-08-22 | Stop reason: HOSPADM

## 2018-08-20 RX ORDER — AMLODIPINE BESYLATE 10 MG/1
10 TABLET ORAL NIGHTLY
Status: DISCONTINUED | OUTPATIENT
Start: 2018-08-21 | End: 2018-08-22 | Stop reason: HOSPADM

## 2018-08-20 RX ORDER — SODIUM CHLORIDE 9 MG/ML
INJECTION, SOLUTION INTRAVENOUS CONTINUOUS
Status: DISCONTINUED | OUTPATIENT
Start: 2018-08-20 | End: 2018-08-22

## 2018-08-20 RX ORDER — SODIUM CHLORIDE 0.9 % (FLUSH) 0.9 %
10 SYRINGE (ML) INJECTION PRN
Status: DISCONTINUED | OUTPATIENT
Start: 2018-08-20 | End: 2018-08-22 | Stop reason: HOSPADM

## 2018-08-20 RX ORDER — DONEPEZIL HYDROCHLORIDE 5 MG/1
5 TABLET, FILM COATED ORAL NIGHTLY
Status: DISCONTINUED | OUTPATIENT
Start: 2018-08-20 | End: 2018-08-22 | Stop reason: HOSPADM

## 2018-08-20 RX ORDER — SODIUM CHLORIDE 0.9 % (FLUSH) 0.9 %
10 SYRINGE (ML) INJECTION EVERY 12 HOURS SCHEDULED
Status: DISCONTINUED | OUTPATIENT
Start: 2018-08-20 | End: 2018-08-22 | Stop reason: HOSPADM

## 2018-08-20 RX ORDER — DOCUSATE SODIUM 100 MG/1
100 CAPSULE, LIQUID FILLED ORAL DAILY
Status: DISCONTINUED | OUTPATIENT
Start: 2018-08-21 | End: 2018-08-22 | Stop reason: HOSPADM

## 2018-08-20 RX ORDER — MIRTAZAPINE 15 MG/1
7.5 TABLET, FILM COATED ORAL NIGHTLY
Status: DISCONTINUED | OUTPATIENT
Start: 2018-08-20 | End: 2018-08-22 | Stop reason: HOSPADM

## 2018-08-20 RX ORDER — LANOLIN ALCOHOL/MO/W.PET/CERES
400 CREAM (GRAM) TOPICAL DAILY
Status: DISCONTINUED | OUTPATIENT
Start: 2018-08-21 | End: 2018-08-22 | Stop reason: HOSPADM

## 2018-08-20 RX ORDER — ATORVASTATIN CALCIUM 40 MG/1
80 TABLET, FILM COATED ORAL NIGHTLY
Status: DISCONTINUED | OUTPATIENT
Start: 2018-08-20 | End: 2018-08-22 | Stop reason: HOSPADM

## 2018-08-20 RX ORDER — M-VIT,TX,IRON,MINS/CALC/FOLIC 27MG-0.4MG
1 TABLET ORAL DAILY
Status: DISCONTINUED | OUTPATIENT
Start: 2018-08-21 | End: 2018-08-22 | Stop reason: HOSPADM

## 2018-08-20 RX ORDER — ATENOLOL 25 MG/1
12.5 TABLET ORAL DAILY
Status: DISCONTINUED | OUTPATIENT
Start: 2018-08-21 | End: 2018-08-22 | Stop reason: HOSPADM

## 2018-08-20 RX ORDER — ACETAMINOPHEN 325 MG/1
650 TABLET ORAL EVERY 4 HOURS PRN
Status: DISCONTINUED | OUTPATIENT
Start: 2018-08-20 | End: 2018-08-22 | Stop reason: HOSPADM

## 2018-08-20 RX ORDER — SODIUM CHLORIDE 9 MG/ML
INJECTION, SOLUTION INTRAVENOUS ONCE
Status: DISCONTINUED | OUTPATIENT
Start: 2018-08-20 | End: 2018-08-20

## 2018-08-20 RX ORDER — FLUTICASONE PROPIONATE 50 MCG
1 SPRAY, SUSPENSION (ML) NASAL 2 TIMES DAILY
Status: DISCONTINUED | OUTPATIENT
Start: 2018-08-20 | End: 2018-08-22 | Stop reason: HOSPADM

## 2018-08-20 RX ORDER — CLOPIDOGREL BISULFATE 75 MG/1
75 TABLET ORAL DAILY
Status: DISCONTINUED | OUTPATIENT
Start: 2018-08-21 | End: 2018-08-22 | Stop reason: HOSPADM

## 2018-08-20 RX ORDER — ONDANSETRON 2 MG/ML
4 INJECTION INTRAMUSCULAR; INTRAVENOUS EVERY 6 HOURS PRN
Status: DISCONTINUED | OUTPATIENT
Start: 2018-08-20 | End: 2018-08-22 | Stop reason: HOSPADM

## 2018-08-20 RX ORDER — DEXTROSE MONOHYDRATE 50 MG/ML
100 INJECTION, SOLUTION INTRAVENOUS PRN
Status: DISCONTINUED | OUTPATIENT
Start: 2018-08-20 | End: 2018-08-22 | Stop reason: HOSPADM

## 2018-08-20 RX ORDER — AMOXICILLIN AND CLAVULANATE POTASSIUM 500; 125 MG/1; MG/1
1 TABLET, FILM COATED ORAL 2 TIMES DAILY
Status: DISCONTINUED | OUTPATIENT
Start: 2018-08-20 | End: 2018-08-22 | Stop reason: HOSPADM

## 2018-08-20 RX ORDER — CALCITRIOL 0.25 UG/1
0.25 CAPSULE, LIQUID FILLED ORAL EVERY OTHER DAY
Status: DISCONTINUED | OUTPATIENT
Start: 2018-08-22 | End: 2018-08-22 | Stop reason: HOSPADM

## 2018-08-20 RX ORDER — FERROUS SULFATE 325(65) MG
324 TABLET ORAL
Status: DISCONTINUED | OUTPATIENT
Start: 2018-08-21 | End: 2018-08-22 | Stop reason: HOSPADM

## 2018-08-20 RX ORDER — PANTOPRAZOLE SODIUM 40 MG/1
40 TABLET, DELAYED RELEASE ORAL
Status: DISCONTINUED | OUTPATIENT
Start: 2018-08-21 | End: 2018-08-22 | Stop reason: HOSPADM

## 2018-08-20 RX ORDER — INSULIN GLARGINE 100 [IU]/ML
20 INJECTION, SOLUTION SUBCUTANEOUS NIGHTLY
Status: DISCONTINUED | OUTPATIENT
Start: 2018-08-20 | End: 2018-08-22 | Stop reason: HOSPADM

## 2018-08-20 RX ORDER — CLONIDINE HYDROCHLORIDE 0.1 MG/1
0.1 TABLET ORAL 3 TIMES DAILY
Status: DISCONTINUED | OUTPATIENT
Start: 2018-08-20 | End: 2018-08-22 | Stop reason: HOSPADM

## 2018-08-20 RX ORDER — NICOTINE POLACRILEX 4 MG
15 LOZENGE BUCCAL PRN
Status: DISCONTINUED | OUTPATIENT
Start: 2018-08-20 | End: 2018-08-22 | Stop reason: HOSPADM

## 2018-08-20 RX ORDER — HYDRALAZINE HYDROCHLORIDE 50 MG/1
50 TABLET, FILM COATED ORAL 4 TIMES DAILY
Status: DISCONTINUED | OUTPATIENT
Start: 2018-08-20 | End: 2018-08-22 | Stop reason: HOSPADM

## 2018-08-20 RX ORDER — DOXYCYCLINE HYCLATE 100 MG/1
100 CAPSULE ORAL 2 TIMES DAILY
Status: DISCONTINUED | OUTPATIENT
Start: 2018-08-20 | End: 2018-08-22 | Stop reason: HOSPADM

## 2018-08-20 RX ADMIN — DOXYCYCLINE HYCLATE 100 MG: 100 CAPSULE, GELATIN COATED ORAL at 22:10

## 2018-08-20 RX ADMIN — FLUTICASONE PROPIONATE 1 SPRAY: 50 SPRAY, METERED NASAL at 22:10

## 2018-08-20 RX ADMIN — DONEPEZIL HYDROCHLORIDE 5 MG: 5 TABLET, FILM COATED ORAL at 22:10

## 2018-08-20 RX ADMIN — SODIUM CHLORIDE: 9 INJECTION, SOLUTION INTRAVENOUS at 22:11

## 2018-08-20 RX ADMIN — Medication 10 ML: at 22:11

## 2018-08-20 RX ADMIN — ENOXAPARIN SODIUM 30 MG: 30 INJECTION SUBCUTANEOUS at 22:10

## 2018-08-20 RX ADMIN — AMOXICILLIN AND CLAVULANATE POTASSIUM 1 TABLET: 500; 125 TABLET, FILM COATED ORAL at 22:11

## 2018-08-20 RX ADMIN — HYDRALAZINE HYDROCHLORIDE 50 MG: 50 TABLET, FILM COATED ORAL at 22:11

## 2018-08-20 RX ADMIN — MIRTAZAPINE 7.5 MG: 15 TABLET, FILM COATED ORAL at 22:11

## 2018-08-20 RX ADMIN — CLONIDINE HYDROCHLORIDE 0.1 MG: 0.1 TABLET ORAL at 22:10

## 2018-08-20 RX ADMIN — ATORVASTATIN CALCIUM 80 MG: 40 TABLET, FILM COATED ORAL at 22:10

## 2018-08-20 ASSESSMENT — PAIN SCALES - GENERAL
PAINLEVEL_OUTOF10: 0

## 2018-08-20 ASSESSMENT — LIFESTYLE VARIABLES: SMOKING_STATUS: 1

## 2018-08-20 NOTE — H&P
aphasia, memory problems, b/l LE weakness, incontinence of stool and urine    Tobacco dependence 9/23/2015        Past Surgical History:   Procedure Laterality Date    BACK SURGERY      COLONOSCOPY      CYSTOSCOPY  4/8/16    Removal of Bladder Stone    ECHO COMPL W DOP COLOR FLOW  5/18/2012         ENDOSCOPY, COLON, DIAGNOSTIC      OTHER SURGICAL HISTORY  11/28/15    lap choley and cholangiogram    OTHER SURGICAL HISTORY  4/8/16    Suprapubic Catheter Insertion    LA COLONOSCOPY FLX DX W/COLLJ SPEC WHEN PFRMD N/A 3/12/2018    COLONOSCOPY performed by Bob Jane MD at [address] [place] [address] OFFICE/OUTPT VISIT,PROCEDURE ONLY Bilateral 3/19/2018    RESECTION OF BONE CURRETAGE LEFT 1ST DIGIT performed by Julio Ellsworth DPM at [place] [address] ENDOSCOPY  03/09/2018       Current Medications:     Current Facility-Administered Medications:     0.9 % sodium chloride infusion, , Intravenous, Once, Chay Stubbs MD    0.9 % sodium chloride infusion, , Intravenous, Once, Chay Stubbs MD    sodium chloride flush 0.9 % injection 10 mL, 10 mL, Intravenous, 2 times per day, Chay Stubbs MD    sodium chloride flush 0.9 % injection 10 mL, 10 mL, Intravenous, PRN, Chay Stubbs MD    acetaminophen (TYLENOL) tablet 650 mg, 650 mg, Oral, Q4H PRN, Chay Stubbs MD    magnesium hydroxide (MILK OF MAGNESIA) 400 MG/5ML suspension 30 mL, 30 mL, Oral, Daily PRN, Chay Stubbs MD    ondansetron [facility]) injection 4 mg, 4 mg, Intravenous, Q6H PRN, Chay Stubbs MD    enoxaparin (LOVENOX) injection 30 mg, 30 mg, Subcutaneous, Daily, Chay Stubbs MD    Allergies:  Patient has no known allergies. Social History     Social History    Marital status: Single     Spouse name: N/A    Number of children: N/A    Years of education: N/A     Occupational History    Not on file.      Social History Main Topics    Smoking status: Current Every Day Smoker rhythm  ABDOMEN: Soft nontender no rebound or guarding  EXTREMITIES: Bilateral palpable femoral pulses. DP and PT signals are present but faint right side is better than the contralateral left side  NEURO: Alert and oriented will obey commands. Does not appear in acute distress. No gross cranial nerve deficits. His speech does have some issue with intermittent expressive aphasia but if given time he seems to gather his thoughts appropriately      LABS:    Lab Results   Component Value Date    WBC 6.4 08/20/2018    HGB 7.8 (L) 08/20/2018    HCT 24.0 (L) 08/20/2018     (H) 08/20/2018    PROTIME 11.8 03/15/2018    INR 1.0 03/15/2018    APTT 45.0 (H) 03/11/2018    K 4.4 08/20/2018    BUN 35 (H) 08/20/2018    CREATININE 1.8 (H) 08/20/2018       RADIOLOGY:  No orders to display       IMPRESSION:   Active Hospital Problems    Diagnosis    PAD [facility]peripheral artery disease) ([facility] [I73.9]       PLAN:  #1 severe peripheral vascular disease status post intervention of the right common iliac and external iliac with a stent and balloon angioplasty. Overall he has good inflow on the right side. He has a patent left iliac system. He has bilateral digital ulcerations at the tips of the great toe. The right side appears to have improved. There is no purulence. On the left side he does have a little bit of purulent material upon compression of the distal portion of the toe. At this point secondary to his kidney function were admitted for hydration. He's been started on fluids. We'll plan on arteriogram with possible intervention tomorrow. I had a discussion with his son Travis Draper on the phone regarding his current condition at this point the patient and the family wished to proceed initially the patient on this visit related is concerned of the staff saying he did not want anything done but when I talked with him this was not the case. He will be admitted to be made nothing by mouth and then we'll proceed from there.  He will be

## 2018-08-20 NOTE — ANESTHESIA PRE PROCEDURE
 hydrALAZINE (APRESOLINE) 50 MG tablet Take 1 tablet by mouth 4 times daily (Patient taking differently: Take 50 mg by mouth 4 times daily Hold for sys<110 dys <60) 90 tablet 3    atenolol (TENORMIN) 25 MG tablet Take 0.5 tablets by mouth daily 30 tablet 3    amLODIPine (NORVASC) 10 MG tablet Take 1 tablet by mouth nightly 30 tablet 3    docusate sodium (COLACE, DULCOLAX) 100 MG CAPS Take 100 mg by mouth daily      calcitRIOL (ROCALTROL) 0.25 MCG capsule Take 1 capsule by mouth every other day M / W / F 30 capsule 3    pantoprazole (PROTONIX) 40 MG tablet Take 1 tablet by mouth 2 times daily (before meals) 30 tablet 0    ferrous sulfate 324 (65 Fe) MG EC tablet Take 1 tablet by mouth daily (with breakfast) 30 tablet     fluticasone (FLONASE) 50 MCG/ACT nasal spray 1 spray by Nasal route 2 times daily      Multiple Vitamins-Minerals (THERAPEUTIC MULTIVITAMIN-MINERALS) tablet Take 1 tablet by mouth daily      mirtazapine (REMERON) 15 MG tablet Take 7.5 mg by mouth nightly      magnesium oxide (MAG-OX) 400 (240 MG) MG tablet Take 1 tablet by mouth daily      atorvastatin (LIPITOR) 80 MG tablet Take 1 tablet by mouth nightly 30 tablet 3     Current Facility-Administered Medications   Medication Dose Route Frequency Provider Last Rate Last Dose    0.9 % sodium chloride infusion   Intravenous Once Jaz Rea MD        0.9 % sodium chloride infusion   Intravenous Once Jaz Rea MD           Allergies:  No Known Allergies    Problem List:    Patient Active Problem List   Diagnosis Code    DJD (degenerative joint disease) M19.90    Type II diabetes mellitus, uncontrolled ([address] E11.65    Hyperlipoproteinemia E78.5    Neuropathy ([facility] G62.9    Depression F32.9    Tobacco dependence F17.200    Pain in lower limb M79.606    PVD (peripheral vascular disease) with claudication (HCC) I73.9    Onychomycosis B35.1    Atherosclerosis of native arteries of extremity with rest pain ([facility] (+) renal disease: CRI,           Endo/Other:    (+) Diabetesusing insulin, blood dyscrasia: anemia:., .                  ROS comment: BS 94 Abdominal:           Vascular:   + PVD, aortic or cerebral, . ROS comment: S/p right iliac angioplasty. Anesthesia Plan      MAC     ASA 3       Induction: intravenous. Anesthetic plan and risks discussed with patient. Plan discussed with CRNA.                 Joao Neely DO   8/20/2018

## 2018-08-21 ENCOUNTER — APPOINTMENT (OUTPATIENT)
Dept: GENERAL RADIOLOGY | Age: 68
End: 2018-08-21
Attending: SURGERY
Payer: MEDICAID

## 2018-08-21 ENCOUNTER — APPOINTMENT (OUTPATIENT)
Dept: CARDIAC CATH/INVASIVE PROCEDURES | Age: 68
End: 2018-08-21
Payer: MEDICAID

## 2018-08-21 LAB
ABO/RH: NORMAL
ANION GAP SERPL CALCULATED.3IONS-SCNC: 14 MMOL/L (ref 7–16)
ANTIBODY SCREEN: NORMAL
BUN BLDV-MCNC: 30 MG/DL (ref 8–23)
CALCIUM SERPL-MCNC: 8 MG/DL (ref 8.6–10.2)
CHLORIDE BLD-SCNC: 108 MMOL/L (ref 98–107)
CO2: 22 MMOL/L (ref 22–29)
CREAT SERPL-MCNC: 1.7 MG/DL (ref 0.7–1.2)
FOLATE: 11.8 NG/ML (ref 4.8–24.2)
GFR AFRICAN AMERICAN: 49
GFR NON-AFRICAN AMERICAN: 49 ML/MIN/1.73
GLUCOSE BLD-MCNC: 165 MG/DL (ref 74–109)
HCT VFR BLD CALC: 20.8 % (ref 37–54)
HEMOGLOBIN: 6.4 G/DL (ref 12.5–16.5)
IRON SATURATION: 18 % (ref 20–55)
IRON: 35 MCG/DL (ref 59–158)
MCH RBC QN AUTO: 31.7 PG (ref 26–35)
MCHC RBC AUTO-ENTMCNC: 30.8 % (ref 32–34.5)
MCV RBC AUTO: 103 FL (ref 80–99.9)
METER GLUCOSE: 127 MG/DL (ref 70–110)
METER GLUCOSE: 129 MG/DL (ref 70–110)
METER GLUCOSE: 151 MG/DL (ref 70–110)
METER GLUCOSE: 304 MG/DL (ref 70–110)
PDW BLD-RTO: 14.6 FL (ref 11.5–15)
PLATELET # BLD: 378 E9/L (ref 130–450)
PMV BLD AUTO: 8.8 FL (ref 7–12)
POTASSIUM REFLEX MAGNESIUM: 5.1 MMOL/L (ref 3.5–5)
POTASSIUM SERPL-SCNC: 4.3 MMOL/L (ref 3.5–5)
RBC # BLD: 2.02 E12/L (ref 3.8–5.8)
SODIUM BLD-SCNC: 144 MMOL/L (ref 132–146)
TOTAL IRON BINDING CAPACITY: 197 MCG/DL (ref 250–450)
TSH SERPL DL<=0.05 MIU/L-ACNC: 2.95 UIU/ML (ref 0.27–4.2)
VITAMIN B-12: 863 PG/ML (ref 211–946)
WBC # BLD: 4.9 E9/L (ref 4.5–11.5)

## 2018-08-21 PROCEDURE — C1769 GUIDE WIRE: HCPCS

## 2018-08-21 PROCEDURE — C1887 CATHETER, GUIDING: HCPCS

## 2018-08-21 PROCEDURE — P9016 RBC LEUKOCYTES REDUCED: HCPCS

## 2018-08-21 PROCEDURE — 36430 TRANSFUSION BLD/BLD COMPNT: CPT

## 2018-08-21 PROCEDURE — 36246 INS CATH ABD/L-EXT ART 2ND: CPT | Performed by: SURGERY

## 2018-08-21 PROCEDURE — 84132 ASSAY OF SERUM POTASSIUM: CPT

## 2018-08-21 PROCEDURE — 6360000002 HC RX W HCPCS: Performed by: NURSE PRACTITIONER

## 2018-08-21 PROCEDURE — G0378 HOSPITAL OBSERVATION PER HR: HCPCS

## 2018-08-21 PROCEDURE — 86900 BLOOD TYPING SEROLOGIC ABO: CPT

## 2018-08-21 PROCEDURE — 82962 GLUCOSE BLOOD TEST: CPT

## 2018-08-21 PROCEDURE — 75716 ARTERY X-RAYS ARMS/LEGS: CPT | Performed by: SURGERY

## 2018-08-21 PROCEDURE — 85027 COMPLETE CBC AUTOMATED: CPT

## 2018-08-21 PROCEDURE — 96374 THER/PROPH/DIAG INJ IV PUSH: CPT

## 2018-08-21 PROCEDURE — 83550 IRON BINDING TEST: CPT

## 2018-08-21 PROCEDURE — C1894 INTRO/SHEATH, NON-LASER: HCPCS

## 2018-08-21 PROCEDURE — 86850 RBC ANTIBODY SCREEN: CPT

## 2018-08-21 PROCEDURE — 6370000000 HC RX 637 (ALT 250 FOR IP): Performed by: SURGERY

## 2018-08-21 PROCEDURE — 36415 COLL VENOUS BLD VENIPUNCTURE: CPT

## 2018-08-21 PROCEDURE — 84443 ASSAY THYROID STIM HORMONE: CPT

## 2018-08-21 PROCEDURE — 96372 THER/PROPH/DIAG INJ SC/IM: CPT

## 2018-08-21 PROCEDURE — 2709999900 HC NON-CHARGEABLE SUPPLY

## 2018-08-21 PROCEDURE — 2580000003 HC RX 258: Performed by: NURSE PRACTITIONER

## 2018-08-21 PROCEDURE — 80048 BASIC METABOLIC PNL TOTAL CA: CPT

## 2018-08-21 PROCEDURE — 36221 PLACE CATH THORACIC AORTA: CPT | Performed by: SURGERY

## 2018-08-21 PROCEDURE — 99232 SBSQ HOSP IP/OBS MODERATE 35: CPT | Performed by: SURGERY

## 2018-08-21 PROCEDURE — 2580000003 HC RX 258: Performed by: SURGERY

## 2018-08-21 PROCEDURE — 86923 COMPATIBILITY TEST ELECTRIC: CPT

## 2018-08-21 PROCEDURE — 82746 ASSAY OF FOLIC ACID SERUM: CPT

## 2018-08-21 PROCEDURE — 6360000002 HC RX W HCPCS

## 2018-08-21 PROCEDURE — 82607 VITAMIN B-12: CPT

## 2018-08-21 PROCEDURE — 6360000002 HC RX W HCPCS: Performed by: SURGERY

## 2018-08-21 PROCEDURE — 86901 BLOOD TYPING SEROLOGIC RH(D): CPT

## 2018-08-21 PROCEDURE — 71045 X-RAY EXAM CHEST 1 VIEW: CPT

## 2018-08-21 PROCEDURE — 83540 ASSAY OF IRON: CPT

## 2018-08-21 PROCEDURE — 2500000003 HC RX 250 WO HCPCS

## 2018-08-21 RX ORDER — SODIUM CHLORIDE 0.9 % (FLUSH) 0.9 %
10 SYRINGE (ML) INJECTION EVERY 12 HOURS SCHEDULED
Status: DISCONTINUED | OUTPATIENT
Start: 2018-08-21 | End: 2018-08-22 | Stop reason: HOSPADM

## 2018-08-21 RX ORDER — SODIUM CHLORIDE 0.9 % (FLUSH) 0.9 %
10 SYRINGE (ML) INJECTION PRN
Status: DISCONTINUED | OUTPATIENT
Start: 2018-08-21 | End: 2018-08-22 | Stop reason: HOSPADM

## 2018-08-21 RX ORDER — HEPARIN SODIUM (PORCINE) LOCK FLUSH IV SOLN 100 UNIT/ML 100 UNIT/ML
3 SOLUTION INTRAVENOUS EVERY 12 HOURS SCHEDULED
Status: DISCONTINUED | OUTPATIENT
Start: 2018-08-21 | End: 2018-08-22 | Stop reason: HOSPADM

## 2018-08-21 RX ORDER — HEPARIN SODIUM (PORCINE) LOCK FLUSH IV SOLN 100 UNIT/ML 100 UNIT/ML
3 SOLUTION INTRAVENOUS PRN
Status: DISCONTINUED | OUTPATIENT
Start: 2018-08-21 | End: 2018-08-22 | Stop reason: HOSPADM

## 2018-08-21 RX ORDER — 0.9 % SODIUM CHLORIDE 0.9 %
250 INTRAVENOUS SOLUTION INTRAVENOUS ONCE
Status: DISCONTINUED | OUTPATIENT
Start: 2018-08-21 | End: 2018-08-22 | Stop reason: HOSPADM

## 2018-08-21 RX ADMIN — CLOPIDOGREL 75 MG: 75 TABLET, FILM COATED ORAL at 09:13

## 2018-08-21 RX ADMIN — Medication 10 ML: at 18:43

## 2018-08-21 RX ADMIN — HYDRALAZINE HYDROCHLORIDE 50 MG: 50 TABLET, FILM COATED ORAL at 13:38

## 2018-08-21 RX ADMIN — DONEPEZIL HYDROCHLORIDE 5 MG: 5 TABLET, FILM COATED ORAL at 21:48

## 2018-08-21 RX ADMIN — SODIUM CHLORIDE: 9 INJECTION, SOLUTION INTRAVENOUS at 19:21

## 2018-08-21 RX ADMIN — INSULIN GLARGINE 20 UNITS: 100 INJECTION, SOLUTION SUBCUTANEOUS at 22:07

## 2018-08-21 RX ADMIN — FLUTICASONE PROPIONATE 1 SPRAY: 50 SPRAY, METERED NASAL at 09:11

## 2018-08-21 RX ADMIN — CLONIDINE HYDROCHLORIDE 0.1 MG: 0.1 TABLET ORAL at 09:12

## 2018-08-21 RX ADMIN — AMLODIPINE BESYLATE 10 MG: 10 TABLET ORAL at 21:48

## 2018-08-21 RX ADMIN — Medication 10 ML: at 05:13

## 2018-08-21 RX ADMIN — ATENOLOL 12.5 MG: 25 TABLET ORAL at 09:12

## 2018-08-21 RX ADMIN — MULTIPLE VITAMINS W/ MINERALS TAB 1 TABLET: TAB at 09:12

## 2018-08-21 RX ADMIN — INSULIN LISPRO 10 UNITS: 100 INJECTION, SOLUTION INTRAVENOUS; SUBCUTANEOUS at 22:06

## 2018-08-21 RX ADMIN — HYDRALAZINE HYDROCHLORIDE 50 MG: 50 TABLET, FILM COATED ORAL at 21:48

## 2018-08-21 RX ADMIN — Medication 10 ML: at 09:12

## 2018-08-21 RX ADMIN — DOXYCYCLINE HYCLATE 100 MG: 100 CAPSULE, GELATIN COATED ORAL at 09:13

## 2018-08-21 RX ADMIN — Medication 300 UNITS: at 18:43

## 2018-08-21 RX ADMIN — Medication 400 MG: at 09:12

## 2018-08-21 RX ADMIN — Medication 10 ML: at 05:20

## 2018-08-21 RX ADMIN — DOCUSATE SODIUM 100 MG: 100 CAPSULE, LIQUID FILLED ORAL at 09:12

## 2018-08-21 RX ADMIN — FLUTICASONE PROPIONATE 1 SPRAY: 50 SPRAY, METERED NASAL at 21:49

## 2018-08-21 RX ADMIN — MIRTAZAPINE 7.5 MG: 15 TABLET, FILM COATED ORAL at 21:48

## 2018-08-21 RX ADMIN — ATORVASTATIN CALCIUM 80 MG: 40 TABLET, FILM COATED ORAL at 21:48

## 2018-08-21 RX ADMIN — FERROUS SULFATE TAB 325 MG (65 MG ELEMENTAL FE) 324 MG: 325 (65 FE) TAB at 09:12

## 2018-08-21 RX ADMIN — DOXYCYCLINE HYCLATE 100 MG: 100 CAPSULE, GELATIN COATED ORAL at 21:48

## 2018-08-21 RX ADMIN — AMOXICILLIN AND CLAVULANATE POTASSIUM 1 TABLET: 500; 125 TABLET, FILM COATED ORAL at 18:42

## 2018-08-21 RX ADMIN — Medication 300 UNITS: at 11:36

## 2018-08-21 RX ADMIN — AMOXICILLIN AND CLAVULANATE POTASSIUM 1 TABLET: 500; 125 TABLET, FILM COATED ORAL at 09:13

## 2018-08-21 RX ADMIN — HYDRALAZINE HYDROCHLORIDE 50 MG: 50 TABLET, FILM COATED ORAL at 18:42

## 2018-08-21 RX ADMIN — PANTOPRAZOLE SODIUM 40 MG: 40 TABLET, DELAYED RELEASE ORAL at 18:43

## 2018-08-21 RX ADMIN — Medication 10 ML: at 11:37

## 2018-08-21 RX ADMIN — CLONIDINE HYDROCHLORIDE 0.1 MG: 0.1 TABLET ORAL at 13:37

## 2018-08-21 RX ADMIN — HYDRALAZINE HYDROCHLORIDE 50 MG: 50 TABLET, FILM COATED ORAL at 09:13

## 2018-08-21 RX ADMIN — CLONIDINE HYDROCHLORIDE 0.1 MG: 0.1 TABLET ORAL at 21:48

## 2018-08-21 RX ADMIN — ENOXAPARIN SODIUM 30 MG: 30 INJECTION SUBCUTANEOUS at 09:12

## 2018-08-21 ASSESSMENT — PAIN SCALES - GENERAL
PAINLEVEL_OUTOF10: 0

## 2018-08-21 NOTE — PROGRESS NOTES
(Last 24 hours) at 08/21/18 1353  Last data filed at 08/21/18 0917   Gross per 24 hour   Intake             1056 ml   Output             2425 ml   Net            -1369 ml          General: He is alert he is answer questions appropriately. He is intermittent expressive aphasia   Skin: Upper and lower extremities are warm and dry great toes demonstrate ulcerations that are noted. These are stable in appearance  HEENT: Cephalic atraumatic trachea is midline no jugular venous distention  CVS: Currently regular rate and rhythm  Resp: No labored breathing no audible wheezing   Abd: Soft nontender no rebound or guarding  Extremities: Upper and lower extremity motor and sensation are intact. Palpable femoral pulses  Neuro: Grossly intact to obey commands and move all extremities. See above Gen. LABS:    Lab Results   Component Value Date    WBC 4.9 08/21/2018    HGB 6.4 (L) 08/21/2018    HCT 20.8 (L) 08/21/2018     08/21/2018    PROTIME 11.8 03/15/2018    INR 1.0 03/15/2018    APTT 45.0 (H) 03/11/2018    K 5.1 (H) 08/21/2018    BUN 30 (H) 08/21/2018    CREATININE 1.7 (H) 08/21/2018       RADIOLOGY:  XR CHEST PORTABLE   Final Result   1. No gross focal consolidation. 2. Left-sided venous catheter in place, as above. ASSESSMENT/PLAN:   · 1 history of peripheral vascular disease with prior digital debridement secondary to infection of the great toes. He status post intervention of the common iliac and external iliac on the right. He significant iliac artery tortuosity. At this point we had Limited images of his lower extremities secondary the fact he does not tolerate the arteriogram very well. Were planning imaging today with bilateral femoral artery punctures. At that point we will decide our approach being antegrade, a retrograde, or an alternative access at the popliteal segment.  Secondary to his recent stent on the right side I would like to wait until the stent is well incorporated prior to trying to access from the contralateral side. I feel most likely this will require a snare to bring the wire down into the contralateral femoral followed by the sheath and I would fear at this point possible dislodging the stent. Risk benefits and alternatives were discussed with the patient and family. I talked with his son Terry Rivera yesterday explaining the case and what our plan was and he understands.       Electronically signed by Chaparrita Govea MD on 8/21/2018 at 1:53 PM

## 2018-08-21 NOTE — CONSULTS
dysfunction of bladder     Onychomycosis 9/23/2015    Osteomyelitis of toe of left foot ([facility] 2018    Osteomyelitis of toe of right foot ([facility] 0101    Other symbolic dysfunctions (CODE)     Pain in lower limb 9/23/2015    Poor memory     and ?dementia, early stage    Pulmonary nodule 4/5/2016    PVD (peripheral vascular disease) with claudication ([facility] 9/23/2015    Right sided weakness 9/23/2015    Stroke ([facility] 04/2016    with expressive aphasia, memory problems, b/l LE weakness, incontinence of stool and urine    Tobacco dependence 9/23/2015       Past Surgical History:        Procedure Laterality Date    BACK SURGERY      COLONOSCOPY      CYSTOSCOPY  4/8/16    Removal of Bladder Stone    ECHO COMPL W DOP COLOR FLOW  5/18/2012         ENDOSCOPY, COLON, DIAGNOSTIC      OTHER SURGICAL HISTORY  11/28/15    lap choley and cholangiogram    OTHER SURGICAL HISTORY  4/8/16    Suprapubic Catheter Insertion    ID COLONOSCOPY FLX DX W/COLLJ SPEC WHEN PFRMD N/A 3/12/2018    COLONOSCOPY performed by Santiago Caro MD at [facility] OFFICE/OUTPT VISIT,PROCEDURE ONLY Bilateral 3/19/2018    RESECTION OF BONE CURRETAGE LEFT 1ST DIGIT performed by Ian Ocampo DPM at [facility] ENDOSCOPY  03/09/2018       Medications Prior to Admission:    Prior to Admission medications    Medication Sig Start Date End Date Taking?  Authorizing Provider   clopidogrel (PLAVIX) 75 MG tablet Take 1 tablet by mouth daily 8/1/18  Yes Kevin Anderson MD   donepezil (ARICEPT) 5 MG tablet Take 5 mg by mouth nightly   Yes Historical Provider, MD   amoxicillin-clavulanate (AUGMENTIN) 500-125 MG per tablet Take 1 tablet by mouth 2 times daily 5/31/18  Yes Historical Provider, MD   insulin glargine (BASAGLAR KWIKPEN) 100 UNIT/ML injection pen Inject 20 Units into the skin nightly   Yes Historical Provider, MD   doxycycline monohydrate (MONODOX) 100 MG capsule Take 100 mg by mouth 2 times daily   Yes Historical Provider, MD   insulin lispro (HUMALOG) 100 UNIT/ML injection vial Inject 2-10 Units into the skin 4 times daily (before meals and nightly)  Patient taking differently: Inject 4-12 Units into the skin 4 times daily (before meals and nightly) 111-200= 4 units  201-250= 6 units  251-300= 8 units  301-350= 10 units  351-400= 12 units   Notify md <70 or >400 3/21/18  Yes MITCHELL Hector   cloNIDine (CATAPRES) 0.1 MG tablet Take 1 tablet by mouth 3 times daily 3/21/18  Yes MITCHELL Hector   hydrALAZINE (APRESOLINE) 50 MG tablet Take 1 tablet by mouth 4 times daily  Patient taking differently: Take 50 mg by mouth 4 times daily Hold for sys<110 dys <60 3/21/18  Yes MITCHELL Hector   atenolol (TENORMIN) 25 MG tablet Take 0.5 tablets by mouth daily 3/22/18  Yes MITCHELL Hector   amLODIPine (NORVASC) 10 MG tablet Take 1 tablet by mouth nightly 3/21/18  Yes MITCHELL Hector   docusate sodium (COLACE, DULCOLAX) 100 MG CAPS Take 100 mg by mouth daily 3/22/18  Yes MITCHELL Hector   calcitRIOL (ROCALTROL) 0.25 MCG capsule Take 1 capsule by mouth every other day M / W / F 3/21/18  Yes MITCHELL Hector   pantoprazole (PROTONIX) 40 MG tablet Take 1 tablet by mouth 2 times daily (before meals) 3/21/18  Yes MITCHELL Hector   ferrous sulfate 324 (65 Fe) MG EC tablet Take 1 tablet by mouth daily (with breakfast) 3/21/18  Yes MITCHELL Hector   fluticasone (FLONASE) 50 MCG/ACT nasal spray 1 spray by Nasal route 2 times daily   Yes Historical Provider, MD   Multiple Vitamins-Minerals (THERAPEUTIC MULTIVITAMIN-MINERALS) tablet Take 1 tablet by mouth daily   Yes Historical Provider, MD   mirtazapine (REMERON) 15 MG tablet Take 7.5 mg by mouth nightly   Yes Historical Provider, MD   magnesium oxide (MAG-OX) 400 (240 MG) MG tablet Take 1 tablet by mouth daily 4/11/16  Yes Mirza Guzman DO   atorvastatin (LIPITOR) 80 MG tablet Take 1 tablet by mouth nightly 10/8/15  Yes Cheri Allison MD       Allergies:  Patient has no known allergies. Social History:      The patient currently lives nursing home     TOBACCO:   reports that he has been smoking Cigarettes. He has a 5.00 pack-year smoking history. He has never used smokeless tobacco.  ETOH:   reports that he does not drink alcohol. Family History:     Positive as follows:    Family History   Problem Relation Age of Onset    Heart Disease Mother        REVIEW OF SYSTEMS:   Pertinent positives as noted in the HPI. All other systems reviewed and negative. PHYSICAL EXAM:  BP (!) 153/84   Pulse 62   Temp 97.8 °F (36.6 °C) (Temporal)   Resp 16   Ht 6' (1.829 m)   Wt 161 lb 6.4 oz (73.2 kg)   SpO2 98%   BMI 21.89 kg/m²   General appearance: No apparent distress, appears stated age and cooperative. HEENT: Normal cephalic, atraumatic without obvious deformity. Pupils equal, round, and reactive to light. Extra ocular muscles intact. Conjunctivae/corneas clear. Neck: Supple, with full range of motion. No jugular venous distention. Trachea midline. Respiratory:  Normal respiratory effort. decreased bases   Cardiovascular: Regular rate and rhythm with normal S1/S2 without murmurs, rubs or gallops. Abdomen: Soft, non-tender, non-distended with normal bowel sounds. Musculoskeletal: bilateral great toes with small dressings in place at each toe. No clubbing, cyanosis or edema bilaterally. Skin: Skin color, texture, turgor normal.  No rashes or lesions. Neurologic:  Neurovascularly intact without any focal sensory/motor deficits. Psychiatric: Alert and oriented, thought content appropriate, normal insight    Xr Chest Portable  Result Date: 8/21/2018  1. No gross focal consolidation. 2. Left-sided venous catheter in place, as above.     Labs:     Recent Labs      08/20/18   0726  08/21/18   0520   WBC  6.4  4.9   HGB  7.8*  6.4*   HCT  24.0*  20.8*   PLT  481*  378     Recent Labs      08/20/18   0726 08/21/18   0520   NA  146  144   K  4.4  5.1*   CL  105  108*   CO2  27  22   BUN  35*  30*   CREATININE  1.8*  1.7*   CALCIUM  9.0  8.0*     No results for input(s): AST, ALT, BILIDIR, BILITOT, ALKPHOS in the last 72 hours. No results for input(s): INR in the last 72 hours. No results for input(s): Greg Nai in the last 72 hours. Urinalysis:    Lab Results   Component Value Date    NITRU POSITIVE 03/11/2018    WBCUA 10-20 03/11/2018    WBCUA 0-1 10/29/2011    BACTERIA FEW 03/11/2018    RBCUA 0-1 03/11/2018    RBCUA NONE 09/12/2013    BLOODU Negative 03/11/2018    SPECGRAV 1.010 03/11/2018    GLUCOSEU Negative 03/11/2018    GLUCOSEU >=1000 10/29/2011         ASSESSMENT:    Active Hospital Problems    Diagnosis Date Noted    PAD (peripheral artery disease) ([facility] [I73.9] 07/30/2018     Acute on chronic anemia   - H/H from 7.8/24.9---> 6.4/20.8; ordered one unit of PRBCs     Hyperkalemia   - K 5.1    CKD  - stable Cr; appears at or near baseline     Diabetes mellitus  Lab Results   Component Value Date    LABA1C 5.6 03/08/2018   - on ISS and lantus  - monitor BGL     PAD/PVD s/p intervention with stenting last month   - per vascular - here for repeat angiogram    Bilateral toe ulcers     Hypertension  - stable      Chronic Suprapubic catheter     PLAN:    Transfuse one unit of PRBCs  Will need to repeat K today- do stat repeat K  NPO for angiogram   IVF continued  Continue home meds   Sliding scale insulin   CXR for PICC verify  PICC protocol flushes  guaiac stools    DVT Prophylaxis: SCDs   Diet: Diet NPO Time Specified Exceptions are: Sips with Meds, Ice Chips  Code Status: Full Code    PT/OT Eval Status: Active and ongoing    Dispo - tele     Thank you for the consultation.     Porfirio Joshi CNP

## 2018-08-21 NOTE — PROGRESS NOTES
Message sent for hospitalist on call for consult for medical management. Hospitalist on call notified of hemoglobin of 6.4.  Jacob Cervantes  8/21/2018

## 2018-08-21 NOTE — PROGRESS NOTES
Called down to blood bank to receive blood, they are not able to send up the blood due to all the traumas. Called down to [facility] to notify them.

## 2018-08-21 NOTE — CARE COORDINATION
Care Coordination:  Per Loanne Habermann from [facility] and [facility], pt is a medicaid bed hold    Deven Gaytan

## 2018-08-21 NOTE — OP NOTE
Date of procedure: 821/2018    Preoperative diagnosis: Critical limb ischemia the bilateral lower extremities with great toe ulceration    Postoperative diagnosis:  #1 significant tortuosity of the bilateral common iliac and external iliac arteries. #2 patent bilateral common femoral vessels   #3 bilateral diseased branches of the profunda femoris artery   #4 long segment bilateral superficial femoral artery occlusion with reconstitution at the level of Lamont's canal popliteal arteries remain patent it appears that there is single vessel runoff via the peroneal vessel to both lower extremities this also appears to have areas of stenosis in the middle one third seen on both sides. Surgeon: Othella Lefort     Anesthesia: Local     Total amount of contrast: 80 mL     Procedure:   #1 ultrasound-guided needle access the right common femoral vessel   #2 right extremity arteriogram   #3 catheter position placed the contralateral common femoral vessel with images taken of the left extremity     Description of procedure: After risk benefits and alternatives were discussed the patient including but not limited to bleeding, infection, arterial venous nerve injury, myocardial infarction, respiratory failure, contrast reaction, contrast reaction lead to kidney failure, distal embolization emergency surgery limb loss and/or death he understood and wished to proceed. The the day of the procedure he was brought to the Cath Lab and placed in the supine position. He was prepped and draped in the standard sterile fashion a timeout was taken to verify the person the operative site as well as the procedure. Fluoroscopy used identify the femoral head on the right side ultrasound was used identify the bifurcation of the profunda femoris artery, superficial femoral and common femoral vessel. Lidocaine was used to infiltrate the skin and subcutaneous tissue followed by ultrasound-guided micropuncture and wire insertion.  This was then upsized to a Marilin Rolan wire and a 4-Polish sheath was placed. This was flash and flushed with heparinized saline solution images were taken of the right extremity. Secondary to the severe disease was difficult to see distally. There is a significant SFA occlusion with reconstitution of the popliteal segment at the level of Lamont's canal with a below-knee popliteal that's patent and severe distal tibial vessel disease. It appears at the anterior tibial is occluded as well as a posterior tibial was single vessel runoff via a disease peroneal vessel. This was difficult to see distally at this point under direct fluoroscopy we traversed the recently placed iliac stent and was able to initially cross over the aortic bifurcation with a pigtail catheter. Secondary to tortuosity the Marilin Rolan was exchanged for a floppy Glidewire I was able to place this into the contralateral common femoral vessel the pigtail would not track so this was exchanged for a short glide catheter and with gentle manipulation this was able to be placed at the distal external proximal common femoral vessel. Images were taken of the left extremity demonstrating a long segment SFA occlusion reconstitution at the level Lamont's canal the popliteal artery remains patent there is single vessel runoff via disease peroneal vessel. The anterior tibial and posterior tibial. Be occluded. At this point secondary to his elevated creatinine the contrast dose we decided not to proceed with further intervention. I will have a long discussion with the patient as well as the family secondary the fact that he has not been ambulating for an extended period of time and perhaps conservative therapy may be a better option with local wound care. he will be retained overnight her IV hydration and hopefully will be discharged tomorrow.

## 2018-08-22 ENCOUNTER — TELEPHONE (OUTPATIENT)
Dept: VASCULAR SURGERY | Age: 68
End: 2018-08-22

## 2018-08-22 VITALS
HEIGHT: 72 IN | OXYGEN SATURATION: 96 % | TEMPERATURE: 98.9 F | HEART RATE: 72 BPM | DIASTOLIC BLOOD PRESSURE: 64 MMHG | WEIGHT: 161.4 LBS | BODY MASS INDEX: 21.86 KG/M2 | SYSTOLIC BLOOD PRESSURE: 133 MMHG | RESPIRATION RATE: 18 BRPM

## 2018-08-22 LAB
HCT VFR BLD CALC: 23.4 % (ref 37–54)
HEMOGLOBIN: 7.4 G/DL (ref 12.5–16.5)
MCH RBC QN AUTO: 31.6 PG (ref 26–35)
MCHC RBC AUTO-ENTMCNC: 31.6 % (ref 32–34.5)
MCV RBC AUTO: 100 FL (ref 80–99.9)
METER GLUCOSE: 119 MG/DL (ref 70–110)
METER GLUCOSE: 159 MG/DL (ref 70–110)
PDW BLD-RTO: 16.8 FL (ref 11.5–15)
PLATELET # BLD: 362 E9/L (ref 130–450)
PMV BLD AUTO: 8.5 FL (ref 7–12)
RBC # BLD: 2.34 E12/L (ref 3.8–5.8)
WBC # BLD: 5.9 E9/L (ref 4.5–11.5)

## 2018-08-22 PROCEDURE — 82962 GLUCOSE BLOOD TEST: CPT

## 2018-08-22 PROCEDURE — 2580000003 HC RX 258: Performed by: NURSE PRACTITIONER

## 2018-08-22 PROCEDURE — 36415 COLL VENOUS BLD VENIPUNCTURE: CPT

## 2018-08-22 PROCEDURE — 2580000003 HC RX 258: Performed by: SURGERY

## 2018-08-22 PROCEDURE — 96376 TX/PRO/DX INJ SAME DRUG ADON: CPT

## 2018-08-22 PROCEDURE — 6370000000 HC RX 637 (ALT 250 FOR IP): Performed by: SURGERY

## 2018-08-22 PROCEDURE — 85027 COMPLETE CBC AUTOMATED: CPT

## 2018-08-22 PROCEDURE — G0378 HOSPITAL OBSERVATION PER HR: HCPCS

## 2018-08-22 PROCEDURE — 6360000002 HC RX W HCPCS: Performed by: NURSE PRACTITIONER

## 2018-08-22 PROCEDURE — 99232 SBSQ HOSP IP/OBS MODERATE 35: CPT | Performed by: SURGERY

## 2018-08-22 RX ADMIN — HYDRALAZINE HYDROCHLORIDE 50 MG: 50 TABLET, FILM COATED ORAL at 13:40

## 2018-08-22 RX ADMIN — FLUTICASONE PROPIONATE 1 SPRAY: 50 SPRAY, METERED NASAL at 08:36

## 2018-08-22 RX ADMIN — Medication 20 ML: at 04:12

## 2018-08-22 RX ADMIN — INSULIN LISPRO 4 UNITS: 100 INJECTION, SOLUTION INTRAVENOUS; SUBCUTANEOUS at 11:38

## 2018-08-22 RX ADMIN — Medication 400 MG: at 08:35

## 2018-08-22 RX ADMIN — ATENOLOL 12.5 MG: 25 TABLET ORAL at 08:35

## 2018-08-22 RX ADMIN — FERROUS SULFATE TAB 325 MG (65 MG ELEMENTAL FE) 324 MG: 325 (65 FE) TAB at 08:35

## 2018-08-22 RX ADMIN — CLONIDINE HYDROCHLORIDE 0.1 MG: 0.1 TABLET ORAL at 08:36

## 2018-08-22 RX ADMIN — CLONIDINE HYDROCHLORIDE 0.1 MG: 0.1 TABLET ORAL at 13:40

## 2018-08-22 RX ADMIN — AMOXICILLIN AND CLAVULANATE POTASSIUM 1 TABLET: 500; 125 TABLET, FILM COATED ORAL at 08:34

## 2018-08-22 RX ADMIN — DOCUSATE SODIUM 100 MG: 100 CAPSULE, LIQUID FILLED ORAL at 08:35

## 2018-08-22 RX ADMIN — SODIUM CHLORIDE: 9 INJECTION, SOLUTION INTRAVENOUS at 06:35

## 2018-08-22 RX ADMIN — PANTOPRAZOLE SODIUM 40 MG: 40 TABLET, DELAYED RELEASE ORAL at 06:35

## 2018-08-22 RX ADMIN — CALCITRIOL 0.25 MCG: 0.25 CAPSULE, LIQUID FILLED ORAL at 08:35

## 2018-08-22 RX ADMIN — Medication 300 UNITS: at 08:35

## 2018-08-22 RX ADMIN — MULTIPLE VITAMINS W/ MINERALS TAB 1 TABLET: TAB at 08:35

## 2018-08-22 RX ADMIN — HYDRALAZINE HYDROCHLORIDE 50 MG: 50 TABLET, FILM COATED ORAL at 08:35

## 2018-08-22 RX ADMIN — DOXYCYCLINE HYCLATE 100 MG: 100 CAPSULE, GELATIN COATED ORAL at 08:35

## 2018-08-22 RX ADMIN — CLOPIDOGREL 75 MG: 75 TABLET, FILM COATED ORAL at 08:35

## 2018-08-22 RX ADMIN — Medication 300 UNITS: at 04:12

## 2018-08-22 RX ADMIN — Medication 10 ML: at 08:35

## 2018-08-22 ASSESSMENT — PAIN SCALES - GENERAL
PAINLEVEL_OUTOF10: 0

## 2018-08-22 NOTE — PLAN OF CARE
Problem: Tissue Perfusion, Altered:  Goal: Circulatory function within specified parameters  Circulatory function within specified parameters  Outcome: Met This Shift

## 2018-08-22 NOTE — TELEPHONE ENCOUNTER
Raza alexis at [employer] instructed to have pt followed regarding anemia.   Note faxed to her ([phone])

## 2018-08-22 NOTE — PROGRESS NOTES
(73.2 kg)   SpO2 96%   BMI 21.89 kg/m²     Intake/Output Summary (Last 24 hours) at 08/22/18 0853  Last data filed at 08/22/18 0636   Gross per 24 hour   Intake          2624.92 ml   Output             3050 ml   Net          -425.08 ml          General: Alert and oriented answers questions appropriately he is in no acute distress his speech is good today   Skin: Warm and dry. Ulcerations are noted of the great toes. No signs of cellulitis  HEENT: Normocephalic atraumatic trachea is midline no jugular venous distention  CVS: Currently regular rate and rhythm  Resp: No labored breathing   Abd: Soft nontender no rebound or guarding access site is unremarkable  Extremities: Motor and sensation are intact and lower extremity. Contractures are noted right greater than left these are at the level of the knee. With pressure I could not straighten his right leg out his left leg still had approximately 10-15% contracture I was unable to straighten his left leg out with pressure. Neuro: Grossly intact today speech is improved    LABS:    Lab Results   Component Value Date    WBC 5.9 08/22/2018    HGB 7.4 (L) 08/22/2018    HCT 23.4 (L) 08/22/2018     08/22/2018    PROTIME 11.8 03/15/2018    INR 1.0 03/15/2018    APTT 45.0 (H) 03/11/2018    K 4.3 08/21/2018    BUN 30 (H) 08/21/2018    CREATININE 1.7 (H) 08/21/2018       RADIOLOGY:  XR CHEST PORTABLE   Final Result   1. No gross focal consolidation. 2. Left-sided venous catheter in place, as above. ASSESSMENT/PLAN:   · #1 severe peripheral vascular disease status post intervention and revascularization of the iliac artery on the right side. He was a diagnostic cath yesterday secondary to his renal insufficiency. At this point from our standpoint will discuss the case with the patient's family secondary to his contractures and the severe peripheral vascular disease of the lower extremities wound care may be his best option.  Currently he has not ambulated in at least 6 months according to the patient.         Electronically signed by Breana Burns MD on 8/22/2018 at 8:53 AM

## 2018-08-22 NOTE — CARE COORDINATION
SOCIAL WORK/DISCHARGE PLANNING;  Pt has been discharged for today and will return to [facility]. [facility] will have their [facility] pick pt up about 3pm. Pt aware and has his wheelchair in room. Nursing notified.    Ricki GIBSON

## 2018-08-22 NOTE — PROGRESS NOTES
Hospitalist Progress Note      PCP: No primary care provider on file. Date of Admission: 8/20/2018    Chief Complaint: medical management     Hospital Course:    80 y.o. male who we are asked to see/evaluate by Elsa Coleman MD for medical management of DM, CKD, PVD, HTN, CVA. From [address] home. He came in admitted to vascular for angiogram with possible intervention with vascular. Recently here with vascular for PVD/PAD, ulcerations, angiogram, at that time was done with intervention. On 7/30/18,  bilateral femoral ultrasound-guided axis to the common femoral vessels was done with abdominal aortogram with runoff; balloon angioplasty of the common iliac artery,  balloon angioplasty of the external iliac artery, placement of stent at the common iliac, and placement of stent at the external iliac artery done. He presents back for additional testing with Vascular due to bilateral great toe infections. Upon arrival to the hospital labs were done and revealed stable but elevated bun/cr. Low H/H with decrease hgb 6.4 on 8/21/18. He was treated with one unit of blood. Patient had right and left extremity arteriogram by vascular  08/22/18 H&H improved to 7.4 and 23.4. Patient planned to discharge back to nursing home today per vascular      Subjective: Patient is awake and alert, but confused to time and place. Denies any pain or SOB. Bilateral great toes with dressing in place.         Medications:  Reviewed    Infusion Medications    sodium chloride 75 mL/hr at 08/22/18 7419    dextrose       Scheduled Medications    sodium chloride  250 mL Intravenous Once    sodium chloride flush  10 mL Intravenous 2 times per day    heparin flush  3 mL Intravenous 2 times per day    sodium chloride flush  10 mL Intravenous 2 times per day    amLODIPine  10 mg Oral Nightly    amoxicillin-clavulanate  1 tablet Oral BID    atenolol  12.5 mg Oral Daily    atorvastatin  80 mg Oral Nightly    without any focal sensory/motor deficits. Psychiatric: Alert and oriented, thought content appropriate, normal insight     Labs:   Recent Labs      08/20/18   0726  08/21/18   0520  08/22/18   0400   WBC  6.4  4.9  5.9   HGB  7.8*  6.4*  7.4*   HCT  24.0*  20.8*  23.4*   PLT  481*  378  362     Recent Labs      08/20/18   0726  08/21/18   0520  08/21/18   1327   NA  146  144   --    K  4.4  5.1*  4.3   CL  105  108*   --    CO2  27  22   --    BUN  35*  30*   --    CREATININE  1.8*  1.7*   --    CALCIUM  9.0  8.0*   --      No results for input(s): AST, ALT, BILIDIR, BILITOT, ALKPHOS in the last 72 hours. No results for input(s): INR in the last 72 hours. No results for input(s): Linda Davalos in the last 72 hours. Assessment/Plan:    Active Hospital Problems    Diagnosis Date Noted    Acute blood loss anemia [D62] 03/07/2018     Priority: High    Hyperkalemia, mild [E87.5] 03/07/2018     Priority: High    DM2 (diabetes mellitus, type 2) ([facility] [E11.9] 03/07/2018     Priority: Medium    Type II diabetes mellitus, uncontrolled ([facility] [E11.65] 10/29/2011     Priority: Medium    CKD (chronic kidney disease) stage 3, GFR 30-59 ml/min [N18.3] 07/31/2018    PAD (peripheral artery disease) ([facility] [I73.9] 07/30/2018    HTN (hypertension) [I10] 01/16/2017    Suprapubic catheter ([facility] [Z93.59] 01/16/2017     Acute on chronic anemia   - H/H from 7.8/24.9---> 6.4/20.8; Treated with one unit of PRBCs and now back up to 7.4 and 23.4  - patient is known to GI service having been seen in March of this year with      Hyperkalemia   - K 5.1--->4. 3     CKD  - stable Cr; appears at or near baseline      Diabetes mellitus        Lab Results   Component Value Date     LABA1C 5.6 03/08/2018   - on ISS and lantus  - monitor BGL     PAD/PVD s/p intervention with stenting last month   - per vascular - here for repeat angiogram     Bilateral toe ulcers   - Dressings intact     Hypertension  - stable      Chronic Suprapubic catheter      PLAN:     discharge back to nursing home per vascular- attending    if discharged, guaiac stools at nursing home and monitor cbc as outpatient  Will need to follow up with Dr Chung Braswell  If remains inpatient, recommend general surgery/GI work up for acute anemia   Sliding scale insulin   guaiac stools    DVT Prophylaxis: scds   Diet: DIET CARB CONTROL;  Code Status: Full Code    PT/OT Eval Status: na     Dispo - plan to be discharged today     HUMBERTO Porter - CNP

## 2018-08-24 ENCOUNTER — HOSPITAL ENCOUNTER (OUTPATIENT)
Dept: WOUND CARE | Age: 68
Discharge: HOME | End: 2018-08-24
Payer: MEDICAID

## 2018-08-24 VITALS
BODY MASS INDEX: 21.81 KG/M2 | HEIGHT: 72 IN | HEART RATE: 76 BPM | SYSTOLIC BLOOD PRESSURE: 128 MMHG | TEMPERATURE: 97.7 F | DIASTOLIC BLOOD PRESSURE: 6 MMHG | RESPIRATION RATE: 16 BRPM | WEIGHT: 161 LBS

## 2018-08-24 PROCEDURE — 11042 DBRDMT SUBQ TIS 1ST 20SQCM/<: CPT

## 2018-08-24 NOTE — PROGRESS NOTES
weakness 9/23/2015    Stroke ([facility] 04/2016    with expressive aphasia, memory problems, b/l LE weakness, incontinence of stool and urine    Tobacco dependence 9/23/2015     Past Surgical History:   Procedure Laterality Date    BACK SURGERY      COLONOSCOPY      CYSTOSCOPY  4/8/16    Removal of Bladder Stone    ECHO COMPL W DOP COLOR FLOW  5/18/2012         ENDOSCOPY, COLON, DIAGNOSTIC      OTHER SURGICAL HISTORY  11/28/15    lap choley and cholangiogram    OTHER SURGICAL HISTORY  4/8/16    Suprapubic Catheter Insertion    [place] COLONOSCOPY FLX DX W/COLLJ SPEC WHEN PFRMD N/A 3/12/2018    COLONOSCOPY performed by Santiago Caro MD at [place] [address] OFFICE/OUTPT VISIT,PROCEDURE ONLY Bilateral 3/19/2018    RESECTION OF BONE CURRETAGE LEFT 1ST DIGIT performed by Ian Ocampo DPM at [facility] OR    UPPER GASTROINTESTINAL ENDOSCOPY  03/09/2018     Family History   Problem Relation Age of Onset    Heart Disease Mother      Social History   Substance Use Topics    Smoking status: Current Every Day Smoker     Packs/day: 0.10     Years: 50.00     Types: Cigarettes    Smokeless tobacco: Never Used    Alcohol use No     No Known Allergies  Current Outpatient Prescriptions on File Prior to Encounter   Medication Sig Dispense Refill    clopidogrel (PLAVIX) 75 MG tablet Take 1 tablet by mouth daily 30 tablet 3    donepezil (ARICEPT) 5 MG tablet Take 5 mg by mouth nightly      amoxicillin-clavulanate (AUGMENTIN) 500-125 MG per tablet Take 1 tablet by mouth 2 times daily      insulin glargine (BASAGLAR KWIKPEN) 100 UNIT/ML injection pen Inject 20 Units into the skin nightly      doxycycline monohydrate (MONODOX) 100 MG capsule Take 100 mg by mouth 2 times daily      insulin lispro (HUMALOG) 100 UNIT/ML injection vial Inject 2-10 Units into the skin 4 times daily (before meals and nightly) (Patient taking differently: Inject 4-12 Units into the skin 4 times daily (before meals and nightly) 111-200= 4 the business hours of the [address] [place] [address] Road please contact your PCP or go to the nearest emergency room.         Electronically signed by Ian Ocampo DPM on 8/24/2018 at 11:51 AM

## 2018-08-25 LAB
BLOOD BANK DISPENSE STATUS: NORMAL
BLOOD BANK DISPENSE STATUS: NORMAL
BLOOD BANK PRODUCT CODE: NORMAL
BLOOD BANK PRODUCT CODE: NORMAL
BPU ID: NORMAL
BPU ID: NORMAL
DESCRIPTION BLOOD BANK: NORMAL
DESCRIPTION BLOOD BANK: NORMAL

## 2018-08-29 RX ORDER — AMMONIUM LACTATE 12 G/100G
LOTION TOPICAL PRN
Status: ON HOLD | COMMUNITY
End: 2020-01-01 | Stop reason: HOSPADM

## 2018-08-29 RX ORDER — FAMOTIDINE 20 MG/1
20 TABLET, FILM COATED ORAL NIGHTLY
Status: ON HOLD | COMMUNITY
End: 2020-01-01

## 2018-08-29 NOTE — PROGRESS NOTES
Pt currently residing at [facility] after recent discharge from [address] [place] [address]. He is alert and oriented and will sign own consents. He is to be in isolation for ESBL infection to toe,  SP catheter in place.   Transportation to be arranged by either facility van vs [facility] transportation

## 2018-08-31 ENCOUNTER — HOSPITAL ENCOUNTER (OUTPATIENT)
Age: 68
Setting detail: OUTPATIENT SURGERY
Discharge: NURSING, REHAB OR LONG-TERM CARE | End: 2018-08-31
Attending: PODIATRIST | Admitting: PODIATRIST
Payer: MEDICAID

## 2018-08-31 VITALS
TEMPERATURE: 97 F | WEIGHT: 159 LBS | HEART RATE: 50 BPM | OXYGEN SATURATION: 100 % | RESPIRATION RATE: 20 BRPM | BODY MASS INDEX: 19.36 KG/M2 | DIASTOLIC BLOOD PRESSURE: 71 MMHG | SYSTOLIC BLOOD PRESSURE: 147 MMHG | HEIGHT: 76 IN

## 2018-08-31 LAB — METER GLUCOSE: 138 MG/DL (ref 70–110)

## 2018-08-31 PROCEDURE — 82962 GLUCOSE BLOOD TEST: CPT

## 2018-08-31 RX ORDER — SODIUM CHLORIDE 0.9 % (FLUSH) 0.9 %
10 SYRINGE (ML) INJECTION PRN
Status: DISCONTINUED | OUTPATIENT
Start: 2018-08-31 | End: 2018-09-04 | Stop reason: HOSPADM

## 2018-08-31 RX ORDER — SODIUM CHLORIDE 0.9 % (FLUSH) 0.9 %
10 SYRINGE (ML) INJECTION EVERY 12 HOURS SCHEDULED
Status: DISCONTINUED | OUTPATIENT
Start: 2018-08-31 | End: 2018-09-04 | Stop reason: HOSPADM

## 2018-08-31 RX ORDER — HYDROCODONE BITARTRATE AND ACETAMINOPHEN 5; 325 MG/1; MG/1
1 TABLET ORAL EVERY 4 HOURS PRN
Qty: 30 TABLET | Refills: 0 | OUTPATIENT
Start: 2018-08-31 | End: 2018-09-05

## 2018-08-31 RX ORDER — DOXYCYCLINE HYCLATE 100 MG
100 TABLET ORAL 2 TIMES DAILY
Qty: 20 TABLET | Refills: 0 | OUTPATIENT
Start: 2018-08-31 | End: 2018-09-10

## 2018-08-31 ASSESSMENT — PAIN - FUNCTIONAL ASSESSMENT: PAIN_FUNCTIONAL_ASSESSMENT: 0-10

## 2018-09-04 ENCOUNTER — HOSPITAL ENCOUNTER (OUTPATIENT)
Dept: WOUND CARE | Age: 68
Discharge: HOME | End: 2018-09-04

## 2018-09-07 ENCOUNTER — HOSPITAL ENCOUNTER (OUTPATIENT)
Dept: WOUND CARE | Age: 68
Discharge: HOME | End: 2018-09-07
Payer: MEDICAID

## 2018-09-07 VITALS
BODY MASS INDEX: 19.36 KG/M2 | WEIGHT: 159 LBS | DIASTOLIC BLOOD PRESSURE: 66 MMHG | HEART RATE: 64 BPM | SYSTOLIC BLOOD PRESSURE: 118 MMHG | HEIGHT: 76 IN | RESPIRATION RATE: 16 BRPM | TEMPERATURE: 97.8 F

## 2018-09-07 PROCEDURE — 11042 DBRDMT SUBQ TIS 1ST 20SQCM/<: CPT

## 2018-09-07 NOTE — PROGRESS NOTES
by mouth 2 times daily      cloNIDine (CATAPRES) 0.1 MG tablet Take 1 tablet by mouth 3 times daily 60 tablet 3    atenolol (TENORMIN) 25 MG tablet Take 0.5 tablets by mouth daily 30 tablet 3    amLODIPine (NORVASC) 10 MG tablet Take 1 tablet by mouth nightly 30 tablet 3    docusate sodium (COLACE, DULCOLAX) 100 MG CAPS Take 100 mg by mouth daily      calcitRIOL (ROCALTROL) 0.25 MCG capsule Take 1 capsule by mouth every other day M / W / F 30 capsule 3    pantoprazole (PROTONIX) 40 MG tablet Take 1 tablet by mouth 2 times daily (before meals) 30 tablet 0    ferrous sulfate 324 (65 Fe) MG EC tablet Take 1 tablet by mouth daily (with breakfast) 30 tablet     fluticasone (FLONASE) 50 MCG/ACT nasal spray 1 spray by Nasal route 2 times daily      Multiple Vitamins-Minerals (THERAPEUTIC MULTIVITAMIN-MINERALS) tablet Take 1 tablet by mouth daily      mirtazapine (REMERON) 15 MG tablet Take 7.5 mg by mouth nightly      magnesium oxide (MAG-OX) 400 (240 MG) MG tablet Take 1 tablet by mouth daily      atorvastatin (LIPITOR) 80 MG tablet Take 1 tablet by mouth nightly 30 tablet 3     No current facility-administered medications on file prior to encounter. REVIEW OF SYSTEMS See HPI    Objective:    /66   Pulse 64   Temp 97.8 °F (36.6 °C) (Oral)   Resp 16   Ht 6' 4\" (1.93 m)   Wt 159 lb (72.1 kg)   BMI 19.35 kg/m²   Wt Readings from Last 3 Encounters:   09/07/18 159 lb (72.1 kg)   08/31/18 159 lb (72.1 kg)   08/24/18 161 lb (73 kg)     PHYSICAL EXAM  CONSTITUTIONAL:   Awake, alert, cooperative   EYES:  lids and lashes normal   ENT: external ears and nose without lesions   NECK:  supple, symmetrical, trachea midline   SKIN:  Open wound Present    Assessment:     Problem List Items Addressed This Visit     None        Procedure Note  Indications:  Based on my examination of this patient's wound(s)/ulcer(s) today, debridement is required to promote healing and evaluate the wound base.     Performed by: Maggi Mcginnis DPM    Consent obtained:  Yes    Time out taken:  Yes    Pain Control: Anesthetic  Anesthetic: 2% Lidocaine Gel Topical     Debridement:Excisional Debridement    Using #15 blade scalpel the wound(s)/ulcer(s) was/were sharply debrided down through and including the removal of subcutaneous tissue. Devitalized Tissue Debrided:  fibrin, biofilm, slough and necrotic/eschar to stimulate bleeding to promote healing, post debridement good bleeding base and wound edges noted    Pre Debridement Measurements:  Are located in the [place]  Documentation Flow Sheet    Wound/Ulcer #: 2    Post Debridement Measurements:  Wound/Ulcer Descriptions are Pre Debridement except measurements:    Wound 06/08/18 Other (Comment) Toe (Comment  which one) Left;Distal #1 aquired 5-1-18 diabetic ulcer left  great toe (Active)   Wound Image   8/10/2018 11:58 AM   Wound Type Wound 8/22/2018  7:35 AM   Wound Diabetic Agosto 3 6/12/2018 10:03 AM   Dressing Status Clean;Dry; Intact 8/24/2018 12:01 PM   Dressing Changed Changed/New 8/24/2018 12:01 PM   Dressing/Treatment Dry dressing 8/24/2018 12:01 PM   Wound Cleansed Rinsed/Irrigated with saline 8/24/2018 12:01 PM   Wound Length (cm) 0 cm 9/7/2018 11:08 AM   Wound Width (cm) 0 cm 9/7/2018 11:08 AM   Wound Depth (cm)  0 9/7/2018 11:08 AM   Calculated Wound Size (cm^2) (l*w) 0 cm^2 9/7/2018 11:08 AM   Change in Wound Size % (l*w) 100 9/7/2018 11:08 AM   Wound Assessment Susan Sarah 9/7/2018 11:08 AM   Drainage Amount Scant 9/7/2018 11:08 AM   Drainage Description Serosanguinous 9/7/2018 11:08 AM   Odor None 9/7/2018 11:08 AM   Exposed structure Bone 6/29/2018 12:20 PM   Louise-wound Assessment Calloused 9/7/2018 11:08 AM   Non-staged Wound Description Full thickness 6/12/2018 10:03 AM   Debridement per physician None 6/22/2018  9:37 AM   Time out Yes 8/24/2018 11:47 AM   Procedural Pain 0 6/22/2018  9:37 AM   Post procedural Pain 0 6/22/2018  9:37 AM   Number of days: 91

## 2018-09-11 ENCOUNTER — HOSPITAL ENCOUNTER (OUTPATIENT)
Dept: WOUND CARE | Age: 68
Discharge: HOME | End: 2018-09-11
Payer: MEDICAID

## 2018-09-11 VITALS
HEART RATE: 96 BPM | TEMPERATURE: 97.5 F | HEIGHT: 76 IN | DIASTOLIC BLOOD PRESSURE: 60 MMHG | BODY MASS INDEX: 19.36 KG/M2 | WEIGHT: 159 LBS | SYSTOLIC BLOOD PRESSURE: 128 MMHG | RESPIRATION RATE: 18 BRPM

## 2018-09-11 DIAGNOSIS — I73.9 PERIPHERAL VASCULAR DISEASE OF LOWER EXTREMITY WITH ULCERATION (HCC): Chronic | ICD-10-CM

## 2018-09-11 DIAGNOSIS — L97.909 PERIPHERAL VASCULAR DISEASE OF LOWER EXTREMITY WITH ULCERATION (HCC): Chronic | ICD-10-CM

## 2018-09-11 PROCEDURE — 99213 OFFICE O/P EST LOW 20 MIN: CPT

## 2018-09-11 NOTE — PROGRESS NOTES
1 tablet by mouth daily 30 tablet 3    donepezil (ARICEPT) 5 MG tablet Take 5 mg by mouth nightly      insulin glargine (BASAGLAR KWIKPEN) 100 UNIT/ML injection pen Inject 20 Units into the skin nightly      doxycycline monohydrate (MONODOX) 100 MG capsule Take 100 mg by mouth 2 times daily      insulin lispro (HUMALOG) 100 UNIT/ML injection vial Inject 2-10 Units into the skin 4 times daily (before meals and nightly) 1 vial 3    cloNIDine (CATAPRES) 0.1 MG tablet Take 1 tablet by mouth 3 times daily 60 tablet 3    atenolol (TENORMIN) 25 MG tablet Take 0.5 tablets by mouth daily 30 tablet 3    amLODIPine (NORVASC) 10 MG tablet Take 1 tablet by mouth nightly 30 tablet 3    docusate sodium (COLACE, DULCOLAX) 100 MG CAPS Take 100 mg by mouth daily      calcitRIOL (ROCALTROL) 0.25 MCG capsule Take 1 capsule by mouth every other day M / W / F 30 capsule 3    pantoprazole (PROTONIX) 40 MG tablet Take 1 tablet by mouth 2 times daily (before meals) 30 tablet 0    ferrous sulfate 324 (65 Fe) MG EC tablet Take 1 tablet by mouth daily (with breakfast) 30 tablet     fluticasone (FLONASE) 50 MCG/ACT nasal spray 1 spray by Nasal route 2 times daily      Multiple Vitamins-Minerals (THERAPEUTIC MULTIVITAMIN-MINERALS) tablet Take 1 tablet by mouth daily      mirtazapine (REMERON) 15 MG tablet Take 7.5 mg by mouth nightly      magnesium oxide (MAG-OX) 400 (240 MG) MG tablet Take 1 tablet by mouth daily      atorvastatin (LIPITOR) 80 MG tablet Take 1 tablet by mouth nightly 30 tablet 3    amoxicillin-clavulanate (AUGMENTIN) 500-125 MG per tablet Take 1 tablet by mouth 2 times daily       No current facility-administered medications on file prior to encounter.         REVIEW OF SYSTEMS See HPI    Objective:    /60   Pulse 96   Temp 97.5 °F (36.4 °C) (Oral)   Resp 18   Ht 6' 4\" (1.93 m)   Wt 159 lb (72.1 kg)   BMI 19.35 kg/m²   Wt Readings from Last 3 Encounters:   09/11/18 159 lb (72.1 kg)   09/07/18 159 lb (72.1 kg)   08/31/18 159 lb (72.1 kg)     PHYSICAL EXAM  CONSTITUTIONAL:   Awake, alert, cooperative   EYES:  lids and lashes normal   ENT: external ears and nose without lesions   NECK:  supple, symmetrical, trachea midline   SKIN:  Open wound Present, bilateral great toe wounds left toe wound is essentially healed. The right toe wound has improved significantly status post angioplasty and stent placement. His palpable femoral pulses. Signals are present in the DP and PT. Assessment:     Problem List Items Addressed This Visit     Peripheral vascular disease of lower extremity with ulceration ([address] (Chronic)        Procedure Note  Indications:  Based on my examination of this patient's wound(s)/ulcer(s) today, debridement is not required to promote healing and evaluate the wound base. Wound 06/08/18 Other (Comment) Toe (Comment  which one) Right;Distal #2 aquired 5-1-18 diabetic ulcer great toe  (#3 [facility]) (Active)   Wound Image   8/10/2018 11:58 AM   Wound Type Wound 8/22/2018  7:35 AM   Wound Diabetic Agosto 3 6/12/2018 10:03 AM   Dressing Status Clean;Dry; Intact 9/7/2018 12:00 PM   Dressing Changed Changed/New 9/7/2018 12:00 PM   Dressing/Treatment Dry dressing 9/7/2018 12:00 PM   Wound Cleansed Rinsed/Irrigated with saline 9/7/2018 12:00 PM   Wound Length (cm) 1 cm 9/11/2018  8:46 AM   Wound Width (cm) 1.5 cm 9/11/2018  8:46 AM   Wound Depth (cm)  0.4 9/11/2018  8:46 AM   Calculated Wound Size (cm^2) (l*w) 1.5 cm^2 9/11/2018  8:46 AM   Change in Wound Size % (l*w) 74.92 9/11/2018  8:46 AM   Undermining Starts ___ O'Clock 7 8/24/2018 11:30 AM   Undermining Ends___ O'Clock 9 8/24/2018 11:30 AM   Undermining Maxium Distance (cm) 0.4 8/24/2018 11:30 AM   Wound Assessment White 9/11/2018  8:46 AM   Drainage Amount Scant 9/11/2018  8:46 AM   Drainage Description Serosanguinous 9/11/2018  8:46 AM   Odor None 9/11/2018  8:46 AM   Exposed structure Bone 8/17/2018 10:59 AM   Louise-wound Assessment Calloused

## 2018-09-21 ENCOUNTER — HOSPITAL ENCOUNTER (OUTPATIENT)
Dept: WOUND CARE | Age: 68
Discharge: HOME | End: 2018-09-21
Payer: MEDICAID

## 2018-09-28 ENCOUNTER — HOSPITAL ENCOUNTER (OUTPATIENT)
Dept: WOUND CARE | Age: 68
Discharge: HOME | End: 2018-09-28
Payer: MEDICAID

## 2018-09-28 VITALS
TEMPERATURE: 97.8 F | HEIGHT: 76 IN | SYSTOLIC BLOOD PRESSURE: 110 MMHG | BODY MASS INDEX: 19.36 KG/M2 | RESPIRATION RATE: 18 BRPM | HEART RATE: 82 BPM | DIASTOLIC BLOOD PRESSURE: 58 MMHG | WEIGHT: 159 LBS

## 2018-09-28 PROCEDURE — 11042 DBRDMT SUBQ TIS 1ST 20SQCM/<: CPT

## 2018-09-28 NOTE — PROGRESS NOTES
with expressive aphasia, memory problems, b/l LE weakness, incontinence of stool and urine    Tobacco dependence 9/23/2015     Past Surgical History:   Procedure Laterality Date    BACK SURGERY      COLONOSCOPY      CYSTOSCOPY  4/8/16    Removal of Bladder Stone    ECHO COMPL W DOP COLOR FLOW  5/18/2012         ENDOSCOPY, COLON, DIAGNOSTIC      OTHER SURGICAL HISTORY  11/28/15    lap choley and cholangiogram    OTHER SURGICAL HISTORY  4/8/16    Suprapubic Catheter Insertion    NE COLONOSCOPY FLX DX W/COLLJ SPEC WHEN PFRMD N/A 3/12/2018    COLONOSCOPY performed by Irish Nagy MD at [facility] [employer] OFFICE/OUTPT VISIT,PROCEDURE ONLY Bilateral 3/19/2018    RESECTION OF BONE CURRETAGE LEFT 1ST DIGIT performed by Madelyn Lee DPM at [facility] OR    UPPER GASTROINTESTINAL ENDOSCOPY  03/09/2018     Family History   Problem Relation Age of Onset    Heart Disease Mother      Social History   Substance Use Topics    Smoking status: Current Every Day Smoker     Packs/day: 0.10     Years: 50.00     Types: Cigarettes    Smokeless tobacco: Never Used    Alcohol use No     No Known Allergies  Current Outpatient Prescriptions on File Prior to Encounter   Medication Sig Dispense Refill    ammonium lactate (LAC-HYDRIN) 12 % lotion Apply topically as needed for Dry Skin Apply topically as needed.       famotidine (PEPCID) 20 MG tablet Take 20 mg by mouth nightly      clopidogrel (PLAVIX) 75 MG tablet Take 1 tablet by mouth daily 30 tablet 3    donepezil (ARICEPT) 5 MG tablet Take 5 mg by mouth nightly      amoxicillin-clavulanate (AUGMENTIN) 500-125 MG per tablet Take 1 tablet by mouth 2 times daily      insulin glargine (BASAGLAR KWIKPEN) 100 UNIT/ML injection pen Inject 20 Units into the skin nightly      doxycycline monohydrate (MONODOX) 100 MG capsule Take 100 mg by mouth 2 times daily      insulin lispro (HUMALOG) 100 UNIT/ML injection vial Inject 2-10 Units into the skin 4 times daily (before Culture Taken Yes 6/8/2018 10:49 AM   Debridement per physician None 9/11/2018  9:05 AM   Time out Yes 9/28/2018 11:56 AM   Procedural Pain 0 9/28/2018 11:56 AM   Post procedural Pain 0 9/28/2018 11:56 AM   Number of days: 112       Wound 08/22/18 Toe (Comment  which one) (Active)   Number of days: 37     Percent of Wound/Ulcer Debrided: 100%    Total Surface Area Debrided:  1 sq cm     Estimated Blood Loss:  Minimal  Hemostasis Achieved:  by pressure    Procedural Pain:  2  / 10   Post Procedural Pain:  3 / 10     Response to treatment:  Well tolerated by patient. Plan:   Treatment Note please see attached Discharge Instructions    Written patient dismissal instructions given to patient and signed by patient or POA. Discharge Instructions       Visit Discharge/Physician Orders     Discharge condition: Stable     Assessment of pain at discharge:no     Anesthetic used: 2% lidocaine gel     Discharge to: Home     Left via:Private automobile     Accompanied by: accompanied by spouse     ECF/HHA: [facility]     Dressing Orders:CLEANSE RIGHT GREAT TOE  ULCER WITH WOUND CLEANSER ,VASHE , APPLY PROMOGRAN AND MOISTEN SLIGHTLY WITH NORMAL SALINE.    COVER WITH DRY DRESSING,  KERLEX , SECURE. 3 X WEEK AND AS NEEDED     Treatment Orders:NO PRESSURE TO RIGHT FOOT, DO NOT GET WET      NO TUBIGRIPS        [facility] followup visit:___________2 WEEKS  DR VELEZ_Patient made complex care in wound care__________________________  (Please note your next appointment above and if you are unable to keep, kindly give a 24 hour notice. Thank you.)    Physician signature:__________________________      If you experience any of the following, please call the [address] [place] [address] Road during business hours:    * Increase in Pain  * Temperature over 101  * Increase in drainage from your wound  * Drainage with a foul odor  * Bleeding  * Increase in swelling  * Need for compression bandage changes due to slippage, breakthrough drainage.     If

## 2018-10-09 ENCOUNTER — HOSPITAL ENCOUNTER (OUTPATIENT)
Dept: WOUND CARE | Age: 68
Discharge: HOME | End: 2018-10-09
Payer: MEDICAID

## 2018-10-09 VITALS
HEART RATE: 80 BPM | DIASTOLIC BLOOD PRESSURE: 58 MMHG | RESPIRATION RATE: 18 BRPM | HEIGHT: 76 IN | SYSTOLIC BLOOD PRESSURE: 132 MMHG | TEMPERATURE: 98 F | WEIGHT: 159 LBS | BODY MASS INDEX: 19.36 KG/M2

## 2018-10-09 PROCEDURE — 99213 OFFICE O/P EST LOW 20 MIN: CPT

## 2018-10-09 NOTE — PROGRESS NOTES
(l*w) 0.84 cm^2 10/9/2018  9:32 AM   Change in Wound Size % (l*w) 85.95 10/9/2018  9:32 AM   Undermining Starts ___ O'Clock 7 8/24/2018 11:30 AM   Undermining Ends___ O'Clock 9 8/24/2018 11:30 AM   Undermining Maxium Distance (cm) 0.4 8/24/2018 11:30 AM   Wound Assessment Dry;Pink;Red;Yellow 10/9/2018  9:32 AM   Drainage Amount Scant 10/9/2018  9:32 AM   Drainage Description Serosanguinous 10/9/2018  9:32 AM   Odor None 10/9/2018  9:32 AM   Exposed structure Bone 8/17/2018 10:59 AM   Louise-wound Assessment Calloused 10/9/2018  9:32 AM   Non-staged Wound Description Full thickness 6/12/2018 10:03 AM   Culture Taken Yes 6/8/2018 10:49 AM   Debridement per physician None 10/9/2018  9:45 AM   Time out Yes 10/9/2018  9:45 AM   Procedural Pain 0 10/9/2018  9:45 AM   Post procedural Pain 0 9/28/2018 11:56 AM   Number of days: 123       Wound 08/22/18 Toe (Comment  which one) (Active)   Number of days: 48       Plan:   Treatment Note please see attached Discharge Instructions    Continue with current diabetic foot management and pressure relief. He can continue to follow with podiatry at the nursing home. He is status post intervention of the right extremity with an iliac artery stent. At this point he is improved significantly. From our standpoint he is not walking has bilateral lower extremity contractures. We recommend conservative therapy with local wound care provided by podiatry. He can follow-up with us in 1 month to confirm that he is improving. He will call to's any questions or concerns or any issues. Written patient dismissal instructions given to patient and signed by patient or POA.          Discharge Instructions       Visit Discharge/Physician Orders     Discharge condition: Stable     Assessment of pain at discharge:no     Anesthetic used: 2% lidocaine gel     Discharge to: Home     Left via:Private automobile     Accompanied by: accompanied by spouse     [facility]F/[facility]: [facility]     Dressing Orders:CLEANSE

## 2018-10-19 ENCOUNTER — HOSPITAL ENCOUNTER (OUTPATIENT)
Dept: WOUND CARE | Age: 68
Discharge: HOME | End: 2018-10-19
Payer: MEDICAID

## 2018-10-19 VITALS
HEART RATE: 72 BPM | DIASTOLIC BLOOD PRESSURE: 60 MMHG | TEMPERATURE: 97.8 F | SYSTOLIC BLOOD PRESSURE: 118 MMHG | RESPIRATION RATE: 18 BRPM

## 2018-10-19 PROCEDURE — 11042 DBRDMT SUBQ TIS 1ST 20SQCM/<: CPT

## 2018-10-26 ENCOUNTER — HOSPITAL ENCOUNTER (OUTPATIENT)
Dept: WOUND CARE | Age: 68
Discharge: HOME | End: 2018-10-26
Payer: MEDICAID

## 2018-10-26 VITALS
HEART RATE: 76 BPM | WEIGHT: 159 LBS | DIASTOLIC BLOOD PRESSURE: 54 MMHG | BODY MASS INDEX: 19.36 KG/M2 | SYSTOLIC BLOOD PRESSURE: 112 MMHG | TEMPERATURE: 98.1 F | RESPIRATION RATE: 18 BRPM | HEIGHT: 76 IN

## 2018-10-26 PROCEDURE — 11042 DBRDMT SUBQ TIS 1ST 20SQCM/<: CPT

## 2018-11-02 ENCOUNTER — HOSPITAL ENCOUNTER (OUTPATIENT)
Dept: WOUND CARE | Age: 68
Discharge: HOME | End: 2018-11-02
Payer: MEDICAID

## 2018-11-02 VITALS
TEMPERATURE: 97.8 F | HEART RATE: 72 BPM | SYSTOLIC BLOOD PRESSURE: 104 MMHG | DIASTOLIC BLOOD PRESSURE: 56 MMHG | RESPIRATION RATE: 18 BRPM

## 2018-11-02 PROCEDURE — 11042 DBRDMT SUBQ TIS 1ST 20SQCM/<: CPT

## 2018-11-02 ASSESSMENT — PAIN SCALES - GENERAL: PAINLEVEL_OUTOF10: 0

## 2018-11-02 ASSESSMENT — PAIN DESCRIPTION - PAIN TYPE: TYPE: ACUTE PAIN

## 2018-11-02 ASSESSMENT — PAIN DESCRIPTION - FREQUENCY: FREQUENCY: INTERMITTENT

## 2018-11-02 ASSESSMENT — PAIN DESCRIPTION - ORIENTATION: ORIENTATION: RIGHT

## 2018-11-02 ASSESSMENT — PAIN DESCRIPTION - DESCRIPTORS: DESCRIPTORS: THROBBING

## 2018-11-02 ASSESSMENT — PAIN DESCRIPTION - LOCATION: LOCATION: TOE (COMMENT WHICH ONE)

## 2018-11-02 NOTE — PROGRESS NOTES
MG tablet Take 0.5 tablets by mouth daily 30 tablet 3    amLODIPine (NORVASC) 10 MG tablet Take 1 tablet by mouth nightly 30 tablet 3    docusate sodium (COLACE, DULCOLAX) 100 MG CAPS Take 100 mg by mouth daily      calcitRIOL (ROCALTROL) 0.25 MCG capsule Take 1 capsule by mouth every other day M / W / F 30 capsule 3    pantoprazole (PROTONIX) 40 MG tablet Take 1 tablet by mouth 2 times daily (before meals) 30 tablet 0    ferrous sulfate 324 (65 Fe) MG EC tablet Take 1 tablet by mouth daily (with breakfast) 30 tablet     fluticasone (FLONASE) 50 MCG/ACT nasal spray 1 spray by Nasal route 2 times daily      Multiple Vitamins-Minerals (THERAPEUTIC MULTIVITAMIN-MINERALS) tablet Take 1 tablet by mouth daily      mirtazapine (REMERON) 15 MG tablet Take 7.5 mg by mouth nightly      magnesium oxide (MAG-OX) 400 (240 MG) MG tablet Take 1 tablet by mouth daily      atorvastatin (LIPITOR) 80 MG tablet Take 1 tablet by mouth nightly 30 tablet 3    ammonium lactate (LAC-HYDRIN) 12 % lotion Apply topically as needed for Dry Skin Apply topically as needed. No current facility-administered medications on file prior to encounter. REVIEW OF SYSTEMS See HPI    Objective:    BP (!) 104/56   Pulse 72   Temp 97.8 °F (36.6 °C) (Oral)   Resp 18   Wt Readings from Last 3 Encounters:   10/26/18 159 lb (72.1 kg)   10/09/18 159 lb (72.1 kg)   09/28/18 159 lb (72.1 kg)     PHYSICAL EXAM  CONSTITUTIONAL:   Awake, alert, cooperative   EYES:  lids and lashes normal   ENT: external ears and nose without lesions   NECK:  supple, symmetrical, trachea midline   SKIN:  Open wound Present    Assessment:     Problem List Items Addressed This Visit     None        Procedure Note  Indications:  Based on my examination of this patient's wound(s)/ulcer(s) today, debridement is required to promote healing and evaluate the wound base.     Performed by: Denise Pineda DPM    Consent obtained:  Yes    Time out taken: Yes    Pain Control: Anesthetic  Anesthetic: 2% Lidocaine Gel Topical     Debridement:Excisional Debridement    Using #15 blade scalpel the wound(s)/ulcer(s) was/were sharply debrided down through and including the removal of subcutaneous tissue. Devitalized Tissue Debrided:  fibrin, biofilm, slough and necrotic/eschar to stimulate bleeding to promote healing, post debridement good bleeding base and wound edges noted    Pre Debridement Measurements:  Are located in the [place]  Documentation Flow Sheet    Wound/Ulcer #: 2    Post Debridement Measurements:  Wound/Ulcer Descriptions are Pre Debridement except measurements:    Wound 06/08/18 Other (Comment) Toe (Comment  which one) Right;Distal #2 aquired 5-1-18 diabetic ulcer great toe  (#3 [facility]) (Active)   Wound Image   11/2/2018  9:00 AM   Wound Type Wound 6/12/2018 10:03 AM   Wound Diabetic Agosto 3 6/12/2018 10:03 AM   Dressing Status Clean;Dry; Intact 10/26/2018  9:15 AM   Dressing Changed Changed/New 10/26/2018  9:15 AM   Dressing/Treatment Dry dressing 10/26/2018  9:15 AM   Wound Cleansed Rinsed/Irrigated with saline 10/26/2018  9:15 AM   Wound Length (cm) 0.8 cm 11/2/2018  9:17 AM   Wound Width (cm) 1 cm 11/2/2018  9:17 AM   Wound Depth (cm)  0.2 11/2/2018  9:17 AM   Calculated Wound Size (cm^2) (l*w) 0.8 cm^2 11/2/2018  9:17 AM   Change in Wound Size % (l*w) 86.62 11/2/2018  9:17 AM   Undermining Starts ___ O'Clock 7 8/24/2018 11:30 AM   Undermining Ends___ O'Clock 9 8/24/2018 11:30 AM   Undermining Maxium Distance (cm) 0.4 8/24/2018 11:30 AM   Wound Assessment Pale;Pink; White 11/2/2018  9:00 AM   Drainage Amount None 11/2/2018  9:00 AM   Drainage Description Serosanguinous 10/9/2018  9:32 AM   Odor None 11/2/2018  9:00 AM   Exposed structure Bone 8/17/2018 10:59 AM   Louise-wound Assessment Dry 11/2/2018  9:00 AM   Non-staged Wound Description Full thickness 6/12/2018 10:03 AM   Culture Taken Yes 6/8/2018 10:49 AM   Debridement per physician None

## 2018-11-09 ENCOUNTER — HOSPITAL ENCOUNTER (OUTPATIENT)
Dept: WOUND CARE | Age: 68
Discharge: HOME | End: 2018-11-09
Payer: MEDICAID

## 2018-11-09 VITALS
TEMPERATURE: 97.5 F | HEART RATE: 72 BPM | SYSTOLIC BLOOD PRESSURE: 106 MMHG | DIASTOLIC BLOOD PRESSURE: 60 MMHG | RESPIRATION RATE: 18 BRPM

## 2018-11-09 PROCEDURE — 11042 DBRDMT SUBQ TIS 1ST 20SQCM/<: CPT

## 2018-11-09 NOTE — PROGRESS NOTES
9/23/2015    Stroke ([facility] [address] 04/2016    with expressive aphasia, memory problems, b/l LE weakness, incontinence of stool and urine    Tobacco dependence 9/23/2015     Past Surgical History:   Procedure Laterality Date    BACK SURGERY      COLONOSCOPY      CYSTOSCOPY  4/8/16    Removal of Bladder Stone    ECHO COMPL W DOP COLOR FLOW  5/18/2012         ENDOSCOPY, COLON, DIAGNOSTIC      OTHER SURGICAL HISTORY  11/28/15    lap choley and cholangiogram    OTHER SURGICAL HISTORY  4/8/16    Suprapubic Catheter Insertion    CO COLONOSCOPY FLX DX W/COLLJ SPEC WHEN PFRMD N/A 3/12/2018    COLONOSCOPY performed by Sulema Miguel MD at [address] [place] [address] OFFICE/OUTPT VISIT,PROCEDURE ONLY Bilateral 3/19/2018    RESECTION OF BONE CURRETAGE LEFT 1ST DIGIT performed by Jaylan Quinteros DPM at [facility] OR    UPPER GASTROINTESTINAL ENDOSCOPY  03/09/2018     Family History   Problem Relation Age of Onset    Heart Disease Mother      Social History   Substance Use Topics    Smoking status: Current Every Day Smoker     Packs/day: 0.10     Years: 50.00     Types: Cigarettes    Smokeless tobacco: Never Used    Alcohol use No     No Known Allergies  Current Outpatient Prescriptions on File Prior to Encounter   Medication Sig Dispense Refill    famotidine (PEPCID) 20 MG tablet Take 20 mg by mouth nightly      clopidogrel (PLAVIX) 75 MG tablet Take 1 tablet by mouth daily 30 tablet 3    amoxicillin-clavulanate (AUGMENTIN) 500-125 MG per tablet Take 1 tablet by mouth 2 times daily      insulin glargine (BASAGLAR KWIKPEN) 100 UNIT/ML injection pen Inject 20 Units into the skin nightly      doxycycline monohydrate (MONODOX) 100 MG capsule Take 100 mg by mouth 2 times daily      insulin lispro (HUMALOG) 100 UNIT/ML injection vial Inject 2-10 Units into the skin 4 times daily (before meals and nightly) 1 vial 3    cloNIDine (CATAPRES) 0.1 MG tablet Take 1 tablet by mouth 3 times daily 60 tablet 3    atenolol (TENORMIN) 25 MG tablet Take 0.5 tablets by mouth daily 30 tablet 3    amLODIPine (NORVASC) 10 MG tablet Take 1 tablet by mouth nightly 30 tablet 3    docusate sodium (COLACE, DULCOLAX) 100 MG CAPS Take 100 mg by mouth daily      calcitRIOL (ROCALTROL) 0.25 MCG capsule Take 1 capsule by mouth every other day M / W / F 30 capsule 3    pantoprazole (PROTONIX) 40 MG tablet Take 1 tablet by mouth 2 times daily (before meals) 30 tablet 0    ferrous sulfate 324 (65 Fe) MG EC tablet Take 1 tablet by mouth daily (with breakfast) 30 tablet     fluticasone (FLONASE) 50 MCG/ACT nasal spray 1 spray by Nasal route 2 times daily      Multiple Vitamins-Minerals (THERAPEUTIC MULTIVITAMIN-MINERALS) tablet Take 1 tablet by mouth daily      mirtazapine (REMERON) 15 MG tablet Take 7.5 mg by mouth nightly      magnesium oxide (MAG-OX) 400 (240 MG) MG tablet Take 1 tablet by mouth daily      atorvastatin (LIPITOR) 80 MG tablet Take 1 tablet by mouth nightly 30 tablet 3    ammonium lactate (LAC-HYDRIN) 12 % lotion Apply topically as needed for Dry Skin Apply topically as needed. No current facility-administered medications on file prior to encounter. REVIEW OF SYSTEMS See HPI    Objective:    /60   Pulse 72   Temp 97.5 °F (36.4 °C) (Oral)   Resp 18   Wt Readings from Last 3 Encounters:   10/26/18 159 lb (72.1 kg)   10/09/18 159 lb (72.1 kg)   09/28/18 159 lb (72.1 kg)     PHYSICAL EXAM  CONSTITUTIONAL:   Awake, alert, cooperative   EYES:  lids and lashes normal   ENT: external ears and nose without lesions   NECK:  supple, symmetrical, trachea midline   SKIN:  Open wound Present    Assessment:     Problem List Items Addressed This Visit     None        Procedure Note  Indications:  Based on my examination of this patient's wound(s)/ulcer(s) today, debridement is required to promote healing and evaluate the wound base.     Performed by: Rosalba Whitten DPM    Consent obtained:  Yes    Time out taken:  Yes    Pain

## 2018-11-13 ENCOUNTER — HOSPITAL ENCOUNTER (OUTPATIENT)
Dept: WOUND CARE | Age: 68
Discharge: HOME | End: 2018-11-13
Payer: MEDICAID

## 2018-11-13 VITALS
HEIGHT: 76 IN | WEIGHT: 159 LBS | SYSTOLIC BLOOD PRESSURE: 110 MMHG | HEART RATE: 70 BPM | RESPIRATION RATE: 18 BRPM | BODY MASS INDEX: 19.36 KG/M2 | TEMPERATURE: 97.2 F | DIASTOLIC BLOOD PRESSURE: 66 MMHG

## 2018-11-13 DIAGNOSIS — L97.512 SKIN ULCER OF TOE OF RIGHT FOOT WITH FAT LAYER EXPOSED (HCC): Chronic | ICD-10-CM

## 2018-11-13 PROCEDURE — 11042 DBRDMT SUBQ TIS 1ST 20SQCM/<: CPT

## 2018-11-20 PROBLEM — L97.502: Chronic | Status: ACTIVE | Noted: 2018-11-20

## 2018-11-21 ENCOUNTER — HOSPITAL ENCOUNTER (OUTPATIENT)
Dept: WOUND CARE | Age: 68
Discharge: HOME | End: 2018-11-21
Payer: MEDICAID

## 2018-11-28 ENCOUNTER — HOSPITAL ENCOUNTER (OUTPATIENT)
Dept: WOUND CARE | Age: 68
Discharge: HOME | End: 2018-11-28
Payer: MEDICAID

## 2018-11-28 VITALS
RESPIRATION RATE: 18 BRPM | DIASTOLIC BLOOD PRESSURE: 60 MMHG | SYSTOLIC BLOOD PRESSURE: 118 MMHG | TEMPERATURE: 97.4 F | HEART RATE: 72 BPM

## 2018-11-28 PROCEDURE — 99212 OFFICE O/P EST SF 10 MIN: CPT

## 2018-11-28 NOTE — PROGRESS NOTES
weakness 9/23/2015    Stroke ([facility] [address] 04/2016    with expressive aphasia, memory problems, b/l LE weakness, incontinence of stool and urine    Tobacco dependence 9/23/2015     Past Surgical History:   Procedure Laterality Date    BACK SURGERY      COLONOSCOPY      CYSTOSCOPY  4/8/16    Removal of Bladder Stone    ECHO COMPL W DOP COLOR FLOW  5/18/2012         ENDOSCOPY, COLON, DIAGNOSTIC      OTHER SURGICAL HISTORY  11/28/15    lap choley and cholangiogram    OTHER SURGICAL HISTORY  4/8/16    Suprapubic Catheter Insertion    [place] COLONOSCOPY FLX DX W/COLLJ SPEC WHEN PFRMD N/A 3/12/2018    COLONOSCOPY performed by Eliane Campos MD at [address] OFFICE/OUTPT VISIT,PROCEDURE ONLY Bilateral 3/19/2018    RESECTION OF BONE CURRETAGE LEFT 1ST DIGIT performed by Jarrod Varela DPM at [facility] OR    UPPER GASTROINTESTINAL ENDOSCOPY  03/09/2018     Family History   Problem Relation Age of Onset    Heart Disease Mother      Social History   Substance Use Topics    Smoking status: Current Every Day Smoker     Packs/day: 0.10     Years: 50.00     Types: Cigarettes    Smokeless tobacco: Never Used    Alcohol use No     No Known Allergies  Current Outpatient Prescriptions on File Prior to Encounter   Medication Sig Dispense Refill    ammonium lactate (LAC-HYDRIN) 12 % lotion Apply topically as needed for Dry Skin Apply topically as needed.       famotidine (PEPCID) 20 MG tablet Take 20 mg by mouth nightly      clopidogrel (PLAVIX) 75 MG tablet Take 1 tablet by mouth daily 30 tablet 3    amoxicillin-clavulanate (AUGMENTIN) 500-125 MG per tablet Take 1 tablet by mouth 2 times daily      insulin glargine (BASAGLAR KWIKPEN) 100 UNIT/ML injection pen Inject 20 Units into the skin nightly      doxycycline monohydrate (MONODOX) 100 MG capsule Take 100 mg by mouth 2 times daily      insulin lispro (HUMALOG) 100 UNIT/ML injection vial Inject 2-10 Units into the skin 4 times daily (before meals and nightly) 1

## 2019-04-24 NOTE — CONSULTS
Problem: Patient Care Overview  Goal: Plan of Care Review  Outcome: Outcome(s) achieved Date Met: 04/24/19 04/24/19 1040   Coping/Psychosocial   Plan of Care Reviewed With patient   OTHER   Outcome Summary PT: Patient performs sit to/from stand transfers with conditional independence and gait x 344 feet with conditional independence with use of FWW. Patient reports independence witih LE HEP with use of written handout. Patient to follow up with outpatient PT on Friday 4/26. Patient met all goals and will be discharge home this morning.           insertion. ALLERGIES:  No known allergies. MEDICATIONS PRIOR TO ADMISSION:  Include Flonase, multivitamins, Catapres,  Remeron, Lantus, ergocalciferol, Aricept, Tenormin, mag oxide, Cozaar,  Pletal, Plavix, Lipitor. SOCIAL HISTORY:  A 50-pack year history of tobacco, was a heavy alcohol  user, and is , was a , and now lives in extended care  facility. FAMILY HISTORY:  Both parents  early in their 62s. Father had GI  issues. Mother had diabetes and myocardial infarction. REVIEW OF SYSTEMS:  There have been no fevers, chills, night sweats. No  chest pain. No cough or productive sputum. No nausea or vomiting. No  frequency or burning. There have been melanotic stools and weakness. No  rash. No endocrine issues. No blood dyscrasias except introduced  iatrogenically and he has a suprapubic catheter. No psychiatric history. There is dementia and some weakness, more in the right than the left, but  generalized weakness. PHYSICAL EXAMINATION:  VITAL SIGNS:  97, 16, 51, 173/81. HEENT:  Shows the head to be normocephalic, atraumatic. Extraocular  muscles intact. Pupils equal and reactive. Oropharynx without thrush. Thyroid not enlarged. Trachea midline. HEART:  Regular. LUNGS:  Clear bilaterally. Poor overall exchange. ABDOMEN:  Soft. No rebound, rigidity, or muscle guarding. EXTREMITIES:  Equal and symmetrical.  No edema, cyanosis, or clubbing. There is muscle wasting. The first toe on the left shows exposed bone. The first toe on the right has some skin covering the tissue, which is  probably very close to the bone itself. There is no cellulitis or erythema  and there is no foul smell and no significant drainage. Dorsal pedal pulse  is not palpable. CLINICAL IMPRESSION:  At this time, exposed bone, osteomyelitis left foot.    With this, the patient's condition with ankle brachial issues and the fact  that he has got ongoing GI issue, I discussed the

## 2020-01-01 ENCOUNTER — ANESTHESIA EVENT (OUTPATIENT)
Dept: ENDOSCOPY | Age: 70
End: 2020-01-01
Payer: MEDICAID

## 2020-01-01 ENCOUNTER — ANESTHESIA EVENT (OUTPATIENT)
Dept: ENDOSCOPY | Age: 70
DRG: 041 | End: 2020-01-01
Payer: MEDICAID

## 2020-01-01 ENCOUNTER — HOSPITAL ENCOUNTER (OUTPATIENT)
Dept: RADIATION ONCOLOGY | Age: 70
Discharge: HOME | End: 2020-09-09
Attending: RADIOLOGY
Payer: MEDICAID

## 2020-01-01 ENCOUNTER — APPOINTMENT (OUTPATIENT)
Dept: GENERAL RADIOLOGY | Age: 70
DRG: 041 | End: 2020-01-01
Payer: MEDICAID

## 2020-01-01 ENCOUNTER — ANESTHESIA (OUTPATIENT)
Dept: ENDOSCOPY | Age: 70
End: 2020-01-01
Payer: MEDICAID

## 2020-01-01 ENCOUNTER — APPOINTMENT (OUTPATIENT)
Dept: CT IMAGING | Age: 70
DRG: 041 | End: 2020-01-01
Payer: MEDICAID

## 2020-01-01 ENCOUNTER — TELEPHONE (OUTPATIENT)
Dept: CASE MANAGEMENT | Age: 70
End: 2020-01-01

## 2020-01-01 ENCOUNTER — HOSPITAL ENCOUNTER (OUTPATIENT)
Dept: RADIATION ONCOLOGY | Age: 70
Discharge: HOME | End: 2020-09-10
Attending: RADIOLOGY
Payer: MEDICAID

## 2020-01-01 ENCOUNTER — HOSPITAL ENCOUNTER (OUTPATIENT)
Dept: RADIATION ONCOLOGY | Age: 70
Discharge: HOME | End: 2020-08-31
Attending: RADIOLOGY
Payer: MEDICAID

## 2020-01-01 ENCOUNTER — APPOINTMENT (OUTPATIENT)
Dept: RADIATION ONCOLOGY | Age: 70
End: 2020-01-01
Attending: RADIOLOGY
Payer: MEDICAID

## 2020-01-01 ENCOUNTER — HOSPITAL ENCOUNTER (OUTPATIENT)
Dept: RADIATION ONCOLOGY | Age: 70
Discharge: HOME | End: 2020-09-01
Attending: RADIOLOGY
Payer: MEDICAID

## 2020-01-01 ENCOUNTER — HOSPITAL ENCOUNTER (INPATIENT)
Age: 70
LOS: 6 days | Discharge: NURSING, REHAB OR LONG-TERM CARE | DRG: 041 | End: 2020-08-19
Attending: EMERGENCY MEDICINE | Admitting: FAMILY MEDICINE
Payer: MEDICAID

## 2020-01-01 ENCOUNTER — HOSPITAL ENCOUNTER (OUTPATIENT)
Dept: RADIATION ONCOLOGY | Age: 70
Discharge: HOME | End: 2020-08-24
Attending: RADIOLOGY
Payer: MEDICAID

## 2020-01-01 ENCOUNTER — HOSPITAL ENCOUNTER (OUTPATIENT)
Age: 70
Setting detail: OUTPATIENT SURGERY
Discharge: OTHER INSTITUTION | End: 2020-09-11
Attending: INTERNAL MEDICINE | Admitting: INTERNAL MEDICINE
Payer: MEDICAID

## 2020-01-01 ENCOUNTER — HOSPITAL ENCOUNTER (OUTPATIENT)
Dept: RADIATION ONCOLOGY | Age: 70
Discharge: HOME | End: 2020-08-26
Payer: MEDICAID

## 2020-01-01 ENCOUNTER — HOSPITAL ENCOUNTER (OUTPATIENT)
Dept: RADIATION ONCOLOGY | Age: 70
Discharge: HOME | End: 2020-08-25
Attending: RADIOLOGY
Payer: MEDICAID

## 2020-01-01 ENCOUNTER — ANESTHESIA (OUTPATIENT)
Dept: ENDOSCOPY | Age: 70
DRG: 041 | End: 2020-01-01
Payer: MEDICAID

## 2020-01-01 ENCOUNTER — TELEPHONE (OUTPATIENT)
Dept: RADIATION ONCOLOGY | Age: 70
End: 2020-01-01

## 2020-01-01 ENCOUNTER — APPOINTMENT (OUTPATIENT)
Dept: MRI IMAGING | Age: 70
DRG: 041 | End: 2020-01-01
Payer: MEDICAID

## 2020-01-01 ENCOUNTER — HOSPITAL ENCOUNTER (OUTPATIENT)
Dept: RADIATION ONCOLOGY | Age: 70
Discharge: HOME | End: 2020-08-26
Attending: RADIOLOGY
Payer: MEDICAID

## 2020-01-01 ENCOUNTER — HOSPITAL ENCOUNTER (OUTPATIENT)
Dept: RADIATION ONCOLOGY | Age: 70
Discharge: HOME | End: 2020-08-19
Attending: RADIOLOGY
Payer: MEDICAID

## 2020-01-01 ENCOUNTER — HOSPITAL ENCOUNTER (OUTPATIENT)
Dept: RADIATION ONCOLOGY | Age: 70
Discharge: HOME | End: 2020-08-19
Payer: MEDICAID

## 2020-01-01 ENCOUNTER — HOSPITAL ENCOUNTER (OUTPATIENT)
Dept: RADIATION ONCOLOGY | Age: 70
Discharge: HOME | End: 2020-09-08
Attending: RADIOLOGY
Payer: MEDICAID

## 2020-01-01 ENCOUNTER — HOSPITAL ENCOUNTER (OUTPATIENT)
Dept: RADIATION ONCOLOGY | Age: 70
Discharge: HOME | End: 2020-09-02
Payer: MEDICAID

## 2020-01-01 ENCOUNTER — HOSPITAL ENCOUNTER (OUTPATIENT)
Dept: RADIATION ONCOLOGY | Age: 70
Discharge: HOME | End: 2020-08-28
Attending: RADIOLOGY
Payer: MEDICAID

## 2020-01-01 ENCOUNTER — HOSPITAL ENCOUNTER (OUTPATIENT)
Dept: RADIATION ONCOLOGY | Age: 70
Discharge: HOME | End: 2020-08-21
Attending: RADIOLOGY
Payer: MEDICAID

## 2020-01-01 ENCOUNTER — HOSPITAL ENCOUNTER (OUTPATIENT)
Dept: RADIATION ONCOLOGY | Age: 70
Discharge: HOME | End: 2020-08-20
Attending: RADIOLOGY
Payer: MEDICAID

## 2020-01-01 ENCOUNTER — HOSPITAL ENCOUNTER (OUTPATIENT)
Dept: RADIATION ONCOLOGY | Age: 70
Discharge: HOME | End: 2020-08-27
Attending: RADIOLOGY
Payer: MEDICAID

## 2020-01-01 VITALS
RESPIRATION RATE: 1 BRPM | DIASTOLIC BLOOD PRESSURE: 79 MMHG | SYSTOLIC BLOOD PRESSURE: 201 MMHG | OXYGEN SATURATION: 100 %

## 2020-01-01 VITALS
BODY MASS INDEX: 19.24 KG/M2 | HEIGHT: 76 IN | WEIGHT: 158 LBS | RESPIRATION RATE: 16 BRPM | OXYGEN SATURATION: 98 % | DIASTOLIC BLOOD PRESSURE: 60 MMHG | TEMPERATURE: 97.8 F | HEART RATE: 59 BPM | SYSTOLIC BLOOD PRESSURE: 112 MMHG

## 2020-01-01 VITALS
RESPIRATION RATE: 18 BRPM | WEIGHT: 154.3 LBS | SYSTOLIC BLOOD PRESSURE: 129 MMHG | TEMPERATURE: 97.5 F | HEIGHT: 72 IN | OXYGEN SATURATION: 98 % | DIASTOLIC BLOOD PRESSURE: 61 MMHG | HEART RATE: 67 BPM | BODY MASS INDEX: 20.9 KG/M2

## 2020-01-01 VITALS
DIASTOLIC BLOOD PRESSURE: 110 MMHG | OXYGEN SATURATION: 100 % | RESPIRATION RATE: 18 BRPM | SYSTOLIC BLOOD PRESSURE: 187 MMHG

## 2020-01-01 LAB
ALBUMIN SERPL-MCNC: 3.3 G/DL (ref 3.5–5.2)
ALP BLD-CCNC: 107 U/L (ref 40–129)
ALT SERPL-CCNC: 14 U/L (ref 0–40)
ANION GAP SERPL CALCULATED.3IONS-SCNC: 13 MMOL/L (ref 7–16)
ANION GAP SERPL CALCULATED.3IONS-SCNC: 14 MMOL/L (ref 7–16)
ANION GAP SERPL CALCULATED.3IONS-SCNC: 14 MMOL/L (ref 7–16)
ANION GAP SERPL CALCULATED.3IONS-SCNC: 16 MMOL/L (ref 7–16)
ANISOCYTOSIS: ABNORMAL
AST SERPL-CCNC: 20 U/L (ref 0–39)
BASOPHILS ABSOLUTE: 0 E9/L (ref 0–0.2)
BASOPHILS ABSOLUTE: 0.01 E9/L (ref 0–0.2)
BASOPHILS ABSOLUTE: 0.02 E9/L (ref 0–0.2)
BASOPHILS ABSOLUTE: 0.02 E9/L (ref 0–0.2)
BASOPHILS ABSOLUTE: 0.04 E9/L (ref 0–0.2)
BASOPHILS ABSOLUTE: 0.05 E9/L (ref 0–0.2)
BASOPHILS ABSOLUTE: 0.06 E9/L (ref 0–0.2)
BASOPHILS RELATIVE PERCENT: 0.3 % (ref 0–2)
BASOPHILS RELATIVE PERCENT: 0.4 % (ref 0–2)
BASOPHILS RELATIVE PERCENT: 0.5 % (ref 0–2)
BASOPHILS RELATIVE PERCENT: 0.6 % (ref 0–2)
BASOPHILS RELATIVE PERCENT: 1 % (ref 0–2)
BASOPHILS RELATIVE PERCENT: 1.1 % (ref 0–2)
BASOPHILS RELATIVE PERCENT: 1.2 % (ref 0–2)
BILIRUB SERPL-MCNC: 0.3 MG/DL (ref 0–1.2)
BUN BLDV-MCNC: 32 MG/DL (ref 8–23)
BUN BLDV-MCNC: 33 MG/DL (ref 8–23)
BUN BLDV-MCNC: 37 MG/DL (ref 8–23)
BUN BLDV-MCNC: 46 MG/DL (ref 8–23)
CALCIUM SERPL-MCNC: 9.3 MG/DL (ref 8.6–10.2)
CALCIUM SERPL-MCNC: 9.4 MG/DL (ref 8.6–10.2)
CALCIUM SERPL-MCNC: 9.5 MG/DL (ref 8.6–10.2)
CALCIUM SERPL-MCNC: 9.5 MG/DL (ref 8.6–10.2)
CHLORIDE BLD-SCNC: 102 MMOL/L (ref 98–107)
CHLORIDE BLD-SCNC: 103 MMOL/L (ref 98–107)
CHLORIDE BLD-SCNC: 103 MMOL/L (ref 98–107)
CHLORIDE BLD-SCNC: 98 MMOL/L (ref 98–107)
CHP ED QC CHECK: NORMAL
CO2: 21 MMOL/L (ref 22–29)
CO2: 22 MMOL/L (ref 22–29)
CREAT SERPL-MCNC: 1.7 MG/DL (ref 0.7–1.2)
CREAT SERPL-MCNC: 1.8 MG/DL (ref 0.7–1.2)
CREAT SERPL-MCNC: 1.8 MG/DL (ref 0.7–1.2)
CREAT SERPL-MCNC: 1.9 MG/DL (ref 0.7–1.2)
CULTURE, RESPIRATORY: ABNORMAL
CULTURE, RESPIRATORY: ABNORMAL
EKG ATRIAL RATE: 61 BPM
EKG P AXIS: 73 DEGREES
EKG P-R INTERVAL: 246 MS
EKG Q-T INTERVAL: 408 MS
EKG QRS DURATION: 80 MS
EKG QTC CALCULATION (BAZETT): 410 MS
EKG R AXIS: 63 DEGREES
EKG T AXIS: 74 DEGREES
EKG VENTRICULAR RATE: 61 BPM
EOSINOPHILS ABSOLUTE: 0 E9/L (ref 0.05–0.5)
EOSINOPHILS ABSOLUTE: 0.21 E9/L (ref 0.05–0.5)
EOSINOPHILS ABSOLUTE: 0.24 E9/L (ref 0.05–0.5)
EOSINOPHILS ABSOLUTE: 0.28 E9/L (ref 0.05–0.5)
EOSINOPHILS ABSOLUTE: 0.31 E9/L (ref 0.05–0.5)
EOSINOPHILS ABSOLUTE: 0.39 E9/L (ref 0.05–0.5)
EOSINOPHILS ABSOLUTE: 0.49 E9/L (ref 0.05–0.5)
EOSINOPHILS RELATIVE PERCENT: 0 % (ref 0–6)
EOSINOPHILS RELATIVE PERCENT: 5.6 % (ref 0–6)
EOSINOPHILS RELATIVE PERCENT: 5.9 % (ref 0–6)
EOSINOPHILS RELATIVE PERCENT: 6.6 % (ref 0–6)
EOSINOPHILS RELATIVE PERCENT: 8.8 % (ref 0–6)
EOSINOPHILS RELATIVE PERCENT: 8.8 % (ref 0–6)
EOSINOPHILS RELATIVE PERCENT: 9.7 % (ref 0–6)
GFR AFRICAN AMERICAN: 43
GFR AFRICAN AMERICAN: 45
GFR AFRICAN AMERICAN: 45
GFR AFRICAN AMERICAN: 49
GFR NON-AFRICAN AMERICAN: 43 ML/MIN/1.73
GFR NON-AFRICAN AMERICAN: 45 ML/MIN/1.73
GFR NON-AFRICAN AMERICAN: 45 ML/MIN/1.73
GFR NON-AFRICAN AMERICAN: 49 ML/MIN/1.73
GLUCOSE BLD-MCNC: 122 MG/DL (ref 74–99)
GLUCOSE BLD-MCNC: 130 MG/DL (ref 74–99)
GLUCOSE BLD-MCNC: 199 MG/DL (ref 74–99)
GLUCOSE BLD-MCNC: 208 MG/DL
GLUCOSE BLD-MCNC: 80 MG/DL (ref 74–99)
HCT VFR BLD CALC: 27.4 % (ref 37–54)
HCT VFR BLD CALC: 30.5 % (ref 37–54)
HCT VFR BLD CALC: 30.9 % (ref 37–54)
HCT VFR BLD CALC: 32 % (ref 37–54)
HCT VFR BLD CALC: 32.8 % (ref 37–54)
HCT VFR BLD CALC: 32.8 % (ref 37–54)
HCT VFR BLD CALC: 32.9 % (ref 37–54)
HCT VFR BLD CALC: 34.1 % (ref 37–54)
HEMOGLOBIN: 10.2 G/DL (ref 12.5–16.5)
HEMOGLOBIN: 10.4 G/DL (ref 12.5–16.5)
HEMOGLOBIN: 10.5 G/DL (ref 12.5–16.5)
HEMOGLOBIN: 10.6 G/DL (ref 12.5–16.5)
HEMOGLOBIN: 10.8 G/DL (ref 12.5–16.5)
HEMOGLOBIN: 8.8 G/DL (ref 12.5–16.5)
HEMOGLOBIN: 9.7 G/DL (ref 12.5–16.5)
HEMOGLOBIN: 9.9 G/DL (ref 12.5–16.5)
IMMATURE GRANULOCYTES #: 0.01 E9/L
IMMATURE GRANULOCYTES #: 0.02 E9/L
IMMATURE GRANULOCYTES #: 0.03 E9/L
IMMATURE GRANULOCYTES %: 0.3 % (ref 0–5)
IMMATURE GRANULOCYTES %: 0.4 % (ref 0–5)
IMMATURE GRANULOCYTES %: 0.4 % (ref 0–5)
IMMATURE GRANULOCYTES %: 0.5 % (ref 0–5)
IMMATURE GRANULOCYTES %: 0.5 % (ref 0–5)
IMMATURE GRANULOCYTES %: 0.7 % (ref 0–5)
INR BLD: 1.2
LYMPHOCYTES ABSOLUTE: 1.03 E9/L (ref 1.5–4)
LYMPHOCYTES ABSOLUTE: 1.52 E9/L (ref 1.5–4)
LYMPHOCYTES ABSOLUTE: 1.56 E9/L (ref 1.5–4)
LYMPHOCYTES ABSOLUTE: 1.67 E9/L (ref 1.5–4)
LYMPHOCYTES ABSOLUTE: 1.7 E9/L (ref 1.5–4)
LYMPHOCYTES ABSOLUTE: 1.73 E9/L (ref 1.5–4)
LYMPHOCYTES ABSOLUTE: 2.46 E9/L (ref 1.5–4)
LYMPHOCYTES RELATIVE PERCENT: 27.5 % (ref 20–42)
LYMPHOCYTES RELATIVE PERCENT: 33.5 % (ref 20–42)
LYMPHOCYTES RELATIVE PERCENT: 37.5 % (ref 20–42)
LYMPHOCYTES RELATIVE PERCENT: 37.9 % (ref 20–42)
LYMPHOCYTES RELATIVE PERCENT: 41.4 % (ref 20–42)
LYMPHOCYTES RELATIVE PERCENT: 52.1 % (ref 20–42)
LYMPHOCYTES RELATIVE PERCENT: 53.5 % (ref 20–42)
MCH RBC QN AUTO: 32.7 PG (ref 26–35)
MCH RBC QN AUTO: 33.7 PG (ref 26–35)
MCH RBC QN AUTO: 33.7 PG (ref 26–35)
MCH RBC QN AUTO: 33.8 PG (ref 26–35)
MCH RBC QN AUTO: 33.9 PG (ref 26–35)
MCH RBC QN AUTO: 34 PG (ref 26–35)
MCHC RBC AUTO-ENTMCNC: 31.7 % (ref 32–34.5)
MCHC RBC AUTO-ENTMCNC: 31.7 % (ref 32–34.5)
MCHC RBC AUTO-ENTMCNC: 31.8 % (ref 32–34.5)
MCHC RBC AUTO-ENTMCNC: 31.9 % (ref 32–34.5)
MCHC RBC AUTO-ENTMCNC: 31.9 % (ref 32–34.5)
MCHC RBC AUTO-ENTMCNC: 32 % (ref 32–34.5)
MCHC RBC AUTO-ENTMCNC: 32.1 % (ref 32–34.5)
MCHC RBC AUTO-ENTMCNC: 32.3 % (ref 32–34.5)
MCV RBC AUTO: 101.2 FL (ref 80–99.9)
MCV RBC AUTO: 105.4 FL (ref 80–99.9)
MCV RBC AUTO: 105.6 FL (ref 80–99.9)
MCV RBC AUTO: 105.9 FL (ref 80–99.9)
MCV RBC AUTO: 106.2 FL (ref 80–99.9)
MCV RBC AUTO: 106.5 FL (ref 80–99.9)
MCV RBC AUTO: 106.9 FL (ref 80–99.9)
MCV RBC AUTO: 107.2 FL (ref 80–99.9)
METER GLUCOSE: 102 MG/DL (ref 74–99)
METER GLUCOSE: 102 MG/DL (ref 74–99)
METER GLUCOSE: 103 MG/DL (ref 74–99)
METER GLUCOSE: 113 MG/DL (ref 74–99)
METER GLUCOSE: 116 MG/DL (ref 74–99)
METER GLUCOSE: 117 MG/DL (ref 74–99)
METER GLUCOSE: 117 MG/DL (ref 74–99)
METER GLUCOSE: 122 MG/DL (ref 74–99)
METER GLUCOSE: 122 MG/DL (ref 74–99)
METER GLUCOSE: 123 MG/DL (ref 74–99)
METER GLUCOSE: 127 MG/DL (ref 74–99)
METER GLUCOSE: 140 MG/DL (ref 74–99)
METER GLUCOSE: 140 MG/DL (ref 74–99)
METER GLUCOSE: 141 MG/DL (ref 74–99)
METER GLUCOSE: 147 MG/DL (ref 74–99)
METER GLUCOSE: 170 MG/DL (ref 74–99)
METER GLUCOSE: 175 MG/DL (ref 74–99)
METER GLUCOSE: 178 MG/DL (ref 74–99)
METER GLUCOSE: 208 MG/DL (ref 74–99)
METER GLUCOSE: 216 MG/DL (ref 74–99)
METER GLUCOSE: 220 MG/DL (ref 74–99)
METER GLUCOSE: 225 MG/DL (ref 74–99)
METER GLUCOSE: 262 MG/DL (ref 74–99)
METER GLUCOSE: 64 MG/DL (ref 74–99)
METER GLUCOSE: 65 MG/DL (ref 74–99)
METER GLUCOSE: 68 MG/DL (ref 74–99)
METER GLUCOSE: 70 MG/DL (ref 74–99)
METER GLUCOSE: 73 MG/DL (ref 74–99)
METER GLUCOSE: 74 MG/DL (ref 74–99)
METER GLUCOSE: 76 MG/DL (ref 74–99)
METER GLUCOSE: 95 MG/DL (ref 74–99)
METER GLUCOSE: 95 MG/DL (ref 74–99)
METER GLUCOSE: 97 MG/DL (ref 74–99)
MONOCYTES ABSOLUTE: 0.13 E9/L (ref 0.1–0.95)
MONOCYTES ABSOLUTE: 0.29 E9/L (ref 0.1–0.95)
MONOCYTES ABSOLUTE: 0.3 E9/L (ref 0.1–0.95)
MONOCYTES ABSOLUTE: 0.33 E9/L (ref 0.1–0.95)
MONOCYTES ABSOLUTE: 0.34 E9/L (ref 0.1–0.95)
MONOCYTES ABSOLUTE: 0.41 E9/L (ref 0.1–0.95)
MONOCYTES ABSOLUTE: 0.46 E9/L (ref 0.1–0.95)
MONOCYTES RELATIVE PERCENT: 4.4 % (ref 2–12)
MONOCYTES RELATIVE PERCENT: 6.1 % (ref 2–12)
MONOCYTES RELATIVE PERCENT: 8 % (ref 2–12)
MONOCYTES RELATIVE PERCENT: 8.4 % (ref 2–12)
MONOCYTES RELATIVE PERCENT: 8.8 % (ref 2–12)
MONOCYTES RELATIVE PERCENT: 9.1 % (ref 2–12)
MONOCYTES RELATIVE PERCENT: 9.3 % (ref 2–12)
NEUTROPHILS ABSOLUTE: 1.06 E9/L (ref 1.8–7.3)
NEUTROPHILS ABSOLUTE: 1.62 E9/L (ref 1.8–7.3)
NEUTROPHILS ABSOLUTE: 1.67 E9/L (ref 1.8–7.3)
NEUTROPHILS ABSOLUTE: 1.87 E9/L (ref 1.8–7.3)
NEUTROPHILS ABSOLUTE: 1.88 E9/L (ref 1.8–7.3)
NEUTROPHILS ABSOLUTE: 2.34 E9/L (ref 1.8–7.3)
NEUTROPHILS ABSOLUTE: 2.36 E9/L (ref 1.8–7.3)
NEUTROPHILS RELATIVE PERCENT: 33.3 % (ref 43–80)
NEUTROPHILS RELATIVE PERCENT: 34.4 % (ref 43–80)
NEUTROPHILS RELATIVE PERCENT: 42.4 % (ref 43–80)
NEUTROPHILS RELATIVE PERCENT: 44.2 % (ref 43–80)
NEUTROPHILS RELATIVE PERCENT: 46.1 % (ref 43–80)
NEUTROPHILS RELATIVE PERCENT: 46.5 % (ref 43–80)
NEUTROPHILS RELATIVE PERCENT: 62.9 % (ref 43–80)
ORGANISM: ABNORMAL
OVALOCYTES: ABNORMAL
PDW BLD-RTO: 13.5 FL (ref 11.5–15)
PDW BLD-RTO: 14.8 FL (ref 11.5–15)
PDW BLD-RTO: 14.9 FL (ref 11.5–15)
PDW BLD-RTO: 15.2 FL (ref 11.5–15)
PDW BLD-RTO: 15.3 FL (ref 11.5–15)
PDW BLD-RTO: 15.6 FL (ref 11.5–15)
PLATELET # BLD: 411 E9/L (ref 130–450)
PLATELET # BLD: 429 E9/L (ref 130–450)
PLATELET # BLD: 442 E9/L (ref 130–450)
PLATELET # BLD: 444 E9/L (ref 130–450)
PLATELET # BLD: 448 E9/L (ref 130–450)
PLATELET # BLD: 482 E9/L (ref 130–450)
PLATELET # BLD: 497 E9/L (ref 130–450)
PLATELET # BLD: 502 E9/L (ref 130–450)
PMV BLD AUTO: 8.7 FL (ref 7–12)
PMV BLD AUTO: 8.7 FL (ref 7–12)
PMV BLD AUTO: 8.8 FL (ref 7–12)
PMV BLD AUTO: 8.8 FL (ref 7–12)
PMV BLD AUTO: 8.9 FL (ref 7–12)
PMV BLD AUTO: 9 FL (ref 7–12)
PMV BLD AUTO: 9 FL (ref 7–12)
PMV BLD AUTO: 9.1 FL (ref 7–12)
POIKILOCYTES: ABNORMAL
POLYCHROMASIA: ABNORMAL
POTASSIUM REFLEX MAGNESIUM: 4.8 MMOL/L (ref 3.5–5)
POTASSIUM SERPL-SCNC: 4.9 MMOL/L (ref 3.5–5)
POTASSIUM SERPL-SCNC: 5 MMOL/L (ref 3.5–5)
POTASSIUM SERPL-SCNC: 5.4 MMOL/L (ref 3.5–5)
PROTHROMBIN TIME: 13 SEC (ref 9.3–12.4)
RBC # BLD: 2.6 E12/L (ref 3.8–5.8)
RBC # BLD: 2.88 E12/L (ref 3.8–5.8)
RBC # BLD: 2.91 E12/L (ref 3.8–5.8)
RBC # BLD: 3.03 E12/L (ref 3.8–5.8)
RBC # BLD: 3.06 E12/L (ref 3.8–5.8)
RBC # BLD: 3.09 E12/L (ref 3.8–5.8)
RBC # BLD: 3.19 E12/L (ref 3.8–5.8)
RBC # BLD: 3.24 E12/L (ref 3.8–5.8)
SMEAR, RESPIRATORY: ABNORMAL
SODIUM BLD-SCNC: 136 MMOL/L (ref 132–146)
SODIUM BLD-SCNC: 137 MMOL/L (ref 132–146)
SODIUM BLD-SCNC: 137 MMOL/L (ref 132–146)
SODIUM BLD-SCNC: 138 MMOL/L (ref 132–146)
TOTAL PROTEIN: 7.2 G/DL (ref 6.4–8.3)
TROPONIN: 0.03 NG/ML (ref 0–0.03)
WBC # BLD: 3.2 E9/L (ref 4.5–11.5)
WBC # BLD: 3.8 E9/L (ref 4.5–11.5)
WBC # BLD: 3.8 E9/L (ref 4.5–11.5)
WBC # BLD: 4.1 E9/L (ref 4.5–11.5)
WBC # BLD: 4.4 E9/L (ref 4.5–11.5)
WBC # BLD: 4.7 E9/L (ref 4.5–11.5)
WBC # BLD: 5.1 E9/L (ref 4.5–11.5)
WBC # BLD: 5.7 E9/L (ref 4.5–11.5)

## 2020-01-01 PROCEDURE — 2720000010 HC SURG SUPPLY STERILE: Performed by: INTERNAL MEDICINE

## 2020-01-01 PROCEDURE — 36415 COLL VENOUS BLD VENIPUNCTURE: CPT

## 2020-01-01 PROCEDURE — 82962 GLUCOSE BLOOD TEST: CPT

## 2020-01-01 PROCEDURE — 2580000003 HC RX 258: Performed by: FAMILY MEDICINE

## 2020-01-01 PROCEDURE — 7100000001 HC PACU RECOVERY - ADDTL 15 MIN: Performed by: INTERNAL MEDICINE

## 2020-01-01 PROCEDURE — 77334 RADIATION TREATMENT AID(S): CPT | Performed by: RADIOLOGY

## 2020-01-01 PROCEDURE — 99222 1ST HOSP IP/OBS MODERATE 55: CPT | Performed by: FAMILY MEDICINE

## 2020-01-01 PROCEDURE — 88305 TISSUE EXAM BY PATHOLOGIST: CPT

## 2020-01-01 PROCEDURE — 77417 THER RADIOLOGY PORT IMAGE(S): CPT | Performed by: RADIOLOGY

## 2020-01-01 PROCEDURE — 85610 PROTHROMBIN TIME: CPT

## 2020-01-01 PROCEDURE — 97535 SELF CARE MNGMENT TRAINING: CPT

## 2020-01-01 PROCEDURE — 70030 X-RAY EYE FOR FOREIGN BODY: CPT

## 2020-01-01 PROCEDURE — 99233 SBSQ HOSP IP/OBS HIGH 50: CPT | Performed by: INTERNAL MEDICINE

## 2020-01-01 PROCEDURE — 7100000011 HC PHASE II RECOVERY - ADDTL 15 MIN: Performed by: INTERNAL MEDICINE

## 2020-01-01 PROCEDURE — 3700000001 HC ADD 15 MINUTES (ANESTHESIA): Performed by: INTERNAL MEDICINE

## 2020-01-01 PROCEDURE — 1200000000 HC SEMI PRIVATE

## 2020-01-01 PROCEDURE — 2580000003 HC RX 258: Performed by: INTERNAL MEDICINE

## 2020-01-01 PROCEDURE — 99999 PR OFFICE/OUTPT VISIT,PROCEDURE ONLY: CPT | Performed by: RADIOLOGY

## 2020-01-01 PROCEDURE — 85025 COMPLETE CBC W/AUTO DIFF WBC: CPT

## 2020-01-01 PROCEDURE — 80048 BASIC METABOLIC PNL TOTAL CA: CPT

## 2020-01-01 PROCEDURE — 6370000000 HC RX 637 (ALT 250 FOR IP): Performed by: FAMILY MEDICINE

## 2020-01-01 PROCEDURE — 77307 TELETHX ISODOSE PLAN CPLX: CPT | Performed by: RADIOLOGY

## 2020-01-01 PROCEDURE — 2500000003 HC RX 250 WO HCPCS: Performed by: ANESTHESIOLOGIST ASSISTANT

## 2020-01-01 PROCEDURE — 88342 IMHCHEM/IMCYTCHM 1ST ANTB: CPT

## 2020-01-01 PROCEDURE — 2709999900 HC NON-CHARGEABLE SUPPLY: Performed by: INTERNAL MEDICINE

## 2020-01-01 PROCEDURE — 97162 PT EVAL MOD COMPLEX 30 MIN: CPT

## 2020-01-01 PROCEDURE — 70450 CT HEAD/BRAIN W/O DYE: CPT

## 2020-01-01 PROCEDURE — 71270 CT THORAX DX C-/C+: CPT

## 2020-01-01 PROCEDURE — 7100000010 HC PHASE II RECOVERY - FIRST 15 MIN: Performed by: INTERNAL MEDICINE

## 2020-01-01 PROCEDURE — 2580000003 HC RX 258: Performed by: ANESTHESIOLOGIST ASSISTANT

## 2020-01-01 PROCEDURE — 97166 OT EVAL MOD COMPLEX 45 MIN: CPT

## 2020-01-01 PROCEDURE — 6360000004 HC RX CONTRAST MEDICATION: Performed by: RADIOLOGY

## 2020-01-01 PROCEDURE — 2140000000 HC CCU INTERMEDIATE R&B

## 2020-01-01 PROCEDURE — 7100000000 HC PACU RECOVERY - FIRST 15 MIN: Performed by: INTERNAL MEDICINE

## 2020-01-01 PROCEDURE — 3609027000 HC BRONCHOSCOPY: Performed by: INTERNAL MEDICINE

## 2020-01-01 PROCEDURE — 2500000003 HC RX 250 WO HCPCS

## 2020-01-01 PROCEDURE — 2580000003 HC RX 258: Performed by: RADIOLOGY

## 2020-01-01 PROCEDURE — 31652 BRONCH EBUS SAMPLNG 1/2 NODE: CPT | Performed by: INTERNAL MEDICINE

## 2020-01-01 PROCEDURE — 77336 RADIATION PHYSICS CONSULT: CPT | Performed by: RADIOLOGY

## 2020-01-01 PROCEDURE — 6370000000 HC RX 637 (ALT 250 FOR IP): Performed by: INTERNAL MEDICINE

## 2020-01-01 PROCEDURE — C1887 CATHETER, GUIDING: HCPCS | Performed by: INTERNAL MEDICINE

## 2020-01-01 PROCEDURE — 93005 ELECTROCARDIOGRAM TRACING: CPT | Performed by: EMERGENCY MEDICINE

## 2020-01-01 PROCEDURE — 97530 THERAPEUTIC ACTIVITIES: CPT

## 2020-01-01 PROCEDURE — 77412 RADIATION TX DELIVERY LVL 3: CPT | Performed by: RADIOLOGY

## 2020-01-01 PROCEDURE — 3609011900 HC BRONCHOSCOPY NEEDLE BX TRACHEA MAIN STEM&/BRON: Performed by: INTERNAL MEDICINE

## 2020-01-01 PROCEDURE — 99253 IP/OBS CNSLTJ NEW/EST LOW 45: CPT | Performed by: RADIOLOGY

## 2020-01-01 PROCEDURE — 71250 CT THORAX DX C-: CPT

## 2020-01-01 PROCEDURE — 77427 RADIATION TX MANAGEMENT X5: CPT | Performed by: RADIOLOGY

## 2020-01-01 PROCEDURE — 87206 SMEAR FLUORESCENT/ACID STAI: CPT

## 2020-01-01 PROCEDURE — 99284 EMERGENCY DEPT VISIT MOD MDM: CPT

## 2020-01-01 PROCEDURE — 77290 THER RAD SIMULAJ FIELD CPLX: CPT | Performed by: RADIOLOGY

## 2020-01-01 PROCEDURE — 3609155400 HC ADD ON COMPUTER ASSISTED NAVIGATION: Performed by: INTERNAL MEDICINE

## 2020-01-01 PROCEDURE — 6360000002 HC RX W HCPCS

## 2020-01-01 PROCEDURE — 88108 CYTOPATH CONCENTRATE TECH: CPT

## 2020-01-01 PROCEDURE — 87186 SC STD MICRODIL/AGAR DIL: CPT

## 2020-01-01 PROCEDURE — 2580000003 HC RX 258

## 2020-01-01 PROCEDURE — 71045 X-RAY EXAM CHEST 1 VIEW: CPT

## 2020-01-01 PROCEDURE — 3609011100 HC BRONCHOSCOPY BRUSHINGS: Performed by: INTERNAL MEDICINE

## 2020-01-01 PROCEDURE — 31645 BRNCHSC W/THER ASPIR 1ST: CPT | Performed by: INTERNAL MEDICINE

## 2020-01-01 PROCEDURE — 77262 THER RADIOLOGY TX PLNG INTRM: CPT | Performed by: RADIOLOGY

## 2020-01-01 PROCEDURE — 70553 MRI BRAIN STEM W/O & W/DYE: CPT

## 2020-01-01 PROCEDURE — 80053 COMPREHEN METABOLIC PANEL: CPT

## 2020-01-01 PROCEDURE — 99283 EMERGENCY DEPT VISIT LOW MDM: CPT

## 2020-01-01 PROCEDURE — 93010 ELECTROCARDIOGRAM REPORT: CPT | Performed by: INTERNAL MEDICINE

## 2020-01-01 PROCEDURE — 88173 CYTOPATH EVAL FNA REPORT: CPT

## 2020-01-01 PROCEDURE — 85027 COMPLETE CBC AUTOMATED: CPT

## 2020-01-01 PROCEDURE — 99255 IP/OBS CONSLTJ NEW/EST HI 80: CPT | Performed by: INTERNAL MEDICINE

## 2020-01-01 PROCEDURE — 6360000002 HC RX W HCPCS: Performed by: ANESTHESIOLOGIST ASSISTANT

## 2020-01-01 PROCEDURE — 3700000000 HC ANESTHESIA ATTENDED CARE: Performed by: INTERNAL MEDICINE

## 2020-01-01 PROCEDURE — 87070 CULTURE OTHR SPECIMN AEROBIC: CPT

## 2020-01-01 PROCEDURE — 74178 CT ABD&PLV WO CNTR FLWD CNTR: CPT

## 2020-01-01 PROCEDURE — 3609010800 HC BRONCHOSCOPY ALVEOLAR LAVAGE: Performed by: INTERNAL MEDICINE

## 2020-01-01 PROCEDURE — 84484 ASSAY OF TROPONIN QUANT: CPT

## 2020-01-01 PROCEDURE — A9579 GAD-BASE MR CONTRAST NOS,1ML: HCPCS | Performed by: RADIOLOGY

## 2020-01-01 PROCEDURE — 87077 CULTURE AEROBIC IDENTIFY: CPT

## 2020-01-01 PROCEDURE — 76000 FLUOROSCOPY <1 HR PHYS/QHP: CPT

## 2020-01-01 PROCEDURE — 3609020000 HC BRONCHOSCOPY W/EBUS FNA: Performed by: INTERNAL MEDICINE

## 2020-01-01 PROCEDURE — 0B9M8ZX DRAINAGE OF BILATERAL LUNGS, VIA NATURAL OR ARTIFICIAL OPENING ENDOSCOPIC, DIAGNOSTIC: ICD-10-PCS | Performed by: INTERNAL MEDICINE

## 2020-01-01 PROCEDURE — 0BB88ZX EXCISION OF LEFT UPPER LOBE BRONCHUS, VIA NATURAL OR ARTIFICIAL OPENING ENDOSCOPIC, DIAGNOSTIC: ICD-10-PCS | Performed by: INTERNAL MEDICINE

## 2020-01-01 RX ORDER — SODIUM CHLORIDE 0.9 % (FLUSH) 0.9 %
10 SYRINGE (ML) INJECTION PRN
Status: DISCONTINUED | OUTPATIENT
Start: 2020-01-01 | End: 2020-01-01 | Stop reason: HOSPADM

## 2020-01-01 RX ORDER — ONDANSETRON 2 MG/ML
4 INJECTION INTRAMUSCULAR; INTRAVENOUS
Status: DISCONTINUED | OUTPATIENT
Start: 2020-01-01 | End: 2020-01-01 | Stop reason: HOSPADM

## 2020-01-01 RX ORDER — DEXAMETHASONE SODIUM PHOSPHATE 10 MG/ML
INJECTION, SOLUTION INTRAMUSCULAR; INTRAVENOUS PRN
Status: DISCONTINUED | OUTPATIENT
Start: 2020-01-01 | End: 2020-01-01 | Stop reason: SDUPTHER

## 2020-01-01 RX ORDER — SODIUM CHLORIDE 0.9 % (FLUSH) 0.9 %
SYRINGE (ML) INJECTION
Status: COMPLETED
Start: 2020-01-01 | End: 2020-01-01

## 2020-01-01 RX ORDER — GLYCOPYRROLATE 1 MG/5 ML
SYRINGE (ML) INTRAVENOUS PRN
Status: DISCONTINUED | OUTPATIENT
Start: 2020-01-01 | End: 2020-01-01 | Stop reason: SDUPTHER

## 2020-01-01 RX ORDER — POLYETHYLENE GLYCOL 3350 17 G/17G
17 POWDER, FOR SOLUTION ORAL DAILY PRN
Status: DISCONTINUED | OUTPATIENT
Start: 2020-01-01 | End: 2020-01-01 | Stop reason: HOSPADM

## 2020-01-01 RX ORDER — PETROLATUM 42 G/100G
OINTMENT TOPICAL
Refills: 0 | DISCHARGE
Start: 2020-01-01

## 2020-01-01 RX ORDER — FENTANYL CITRATE 50 UG/ML
INJECTION, SOLUTION INTRAMUSCULAR; INTRAVENOUS PRN
Status: DISCONTINUED | OUTPATIENT
Start: 2020-01-01 | End: 2020-01-01 | Stop reason: SDUPTHER

## 2020-01-01 RX ORDER — CALCITRIOL 0.25 UG/1
0.25 CAPSULE, LIQUID FILLED ORAL EVERY OTHER DAY
Status: DISCONTINUED | OUTPATIENT
Start: 2020-01-01 | End: 2020-01-01 | Stop reason: HOSPADM

## 2020-01-01 RX ORDER — ROCURONIUM BROMIDE 10 MG/ML
INJECTION, SOLUTION INTRAVENOUS PRN
Status: DISCONTINUED | OUTPATIENT
Start: 2020-01-01 | End: 2020-01-01 | Stop reason: SDUPTHER

## 2020-01-01 RX ORDER — ATORVASTATIN CALCIUM 80 MG/1
80 TABLET, FILM COATED ORAL NIGHTLY
Status: DISCONTINUED | OUTPATIENT
Start: 2020-01-01 | End: 2020-01-01 | Stop reason: HOSPADM

## 2020-01-01 RX ORDER — LIDOCAINE HYDROCHLORIDE 20 MG/ML
INJECTION, SOLUTION INTRAVENOUS PRN
Status: DISCONTINUED | OUTPATIENT
Start: 2020-01-01 | End: 2020-01-01 | Stop reason: SDUPTHER

## 2020-01-01 RX ORDER — LABETALOL HYDROCHLORIDE 5 MG/ML
5 INJECTION, SOLUTION INTRAVENOUS EVERY 10 MIN PRN
Status: DISCONTINUED | OUTPATIENT
Start: 2020-01-01 | End: 2020-01-01 | Stop reason: HOSPADM

## 2020-01-01 RX ORDER — PHENYLEPHRINE HCL IN 0.9% NACL 1 MG/10 ML
SYRINGE (ML) INTRAVENOUS PRN
Status: DISCONTINUED | OUTPATIENT
Start: 2020-01-01 | End: 2020-01-01 | Stop reason: SDUPTHER

## 2020-01-01 RX ORDER — SENNA AND DOCUSATE SODIUM 50; 8.6 MG/1; MG/1
2 TABLET, FILM COATED ORAL 2 TIMES DAILY PRN
Status: ON HOLD | COMMUNITY
End: 2020-01-01 | Stop reason: HOSPADM

## 2020-01-01 RX ORDER — SODIUM CHLORIDE 9 MG/ML
INJECTION, SOLUTION INTRAVENOUS CONTINUOUS PRN
Status: DISCONTINUED | OUTPATIENT
Start: 2020-01-01 | End: 2020-01-01 | Stop reason: SDUPTHER

## 2020-01-01 RX ORDER — MEPERIDINE HYDROCHLORIDE 25 MG/ML
12.5 INJECTION INTRAMUSCULAR; INTRAVENOUS; SUBCUTANEOUS
Status: DISCONTINUED | OUTPATIENT
Start: 2020-01-01 | End: 2020-01-01 | Stop reason: HOSPADM

## 2020-01-01 RX ORDER — AMLODIPINE BESYLATE 10 MG/1
10 TABLET ORAL NIGHTLY
Status: DISCONTINUED | OUTPATIENT
Start: 2020-01-01 | End: 2020-01-01 | Stop reason: HOSPADM

## 2020-01-01 RX ORDER — MIRTAZAPINE 15 MG/1
7.5 TABLET, FILM COATED ORAL NIGHTLY
Status: DISCONTINUED | OUTPATIENT
Start: 2020-01-01 | End: 2020-01-01

## 2020-01-01 RX ORDER — NEOSTIGMINE METHYLSULFATE 1 MG/ML
INJECTION, SOLUTION INTRAVENOUS PRN
Status: DISCONTINUED | OUTPATIENT
Start: 2020-01-01 | End: 2020-01-01 | Stop reason: SDUPTHER

## 2020-01-01 RX ORDER — LEVETIRACETAM 500 MG/1
500 TABLET ORAL 2 TIMES DAILY
Status: DISCONTINUED | OUTPATIENT
Start: 2020-01-01 | End: 2020-01-01 | Stop reason: HOSPADM

## 2020-01-01 RX ORDER — LORATADINE 10 MG/1
10 TABLET ORAL 2 TIMES DAILY PRN
Status: ON HOLD | COMMUNITY
End: 2020-01-01 | Stop reason: HOSPADM

## 2020-01-01 RX ORDER — FAMOTIDINE 20 MG/1
20 TABLET, FILM COATED ORAL NIGHTLY
Status: DISCONTINUED | OUTPATIENT
Start: 2020-01-01 | End: 2020-01-01 | Stop reason: HOSPADM

## 2020-01-01 RX ORDER — AMMONIUM LACTATE 12 G/100G
LOTION TOPICAL PRN
COMMUNITY

## 2020-01-01 RX ORDER — HYDRALAZINE HYDROCHLORIDE 50 MG/1
50 TABLET, FILM COATED ORAL 4 TIMES DAILY
Status: ON HOLD | COMMUNITY
End: 2020-01-01 | Stop reason: HOSPADM

## 2020-01-01 RX ORDER — OXYCODONE HYDROCHLORIDE AND ACETAMINOPHEN 5; 325 MG/1; MG/1
1 TABLET ORAL PRN
Status: DISCONTINUED | OUTPATIENT
Start: 2020-01-01 | End: 2020-01-01 | Stop reason: HOSPADM

## 2020-01-01 RX ORDER — NALOXONE HYDROCHLORIDE 0.4 MG/ML
INJECTION, SOLUTION INTRAMUSCULAR; INTRAVENOUS; SUBCUTANEOUS PRN
Status: DISCONTINUED | OUTPATIENT
Start: 2020-01-01 | End: 2020-01-01 | Stop reason: SDUPTHER

## 2020-01-01 RX ORDER — PROPOFOL 10 MG/ML
INJECTION, EMULSION INTRAVENOUS CONTINUOUS PRN
Status: DISCONTINUED | OUTPATIENT
Start: 2020-01-01 | End: 2020-01-01 | Stop reason: SDUPTHER

## 2020-01-01 RX ORDER — NICOTINE POLACRILEX 4 MG
15 LOZENGE BUCCAL PRN
Status: DISCONTINUED | OUTPATIENT
Start: 2020-01-01 | End: 2020-01-01 | Stop reason: HOSPADM

## 2020-01-01 RX ORDER — OXYCODONE HYDROCHLORIDE AND ACETAMINOPHEN 5; 325 MG/1; MG/1
2 TABLET ORAL PRN
Status: DISCONTINUED | OUTPATIENT
Start: 2020-01-01 | End: 2020-01-01 | Stop reason: HOSPADM

## 2020-01-01 RX ORDER — DEXTROSE MONOHYDRATE 25 G/50ML
12.5 INJECTION, SOLUTION INTRAVENOUS PRN
Status: DISCONTINUED | OUTPATIENT
Start: 2020-01-01 | End: 2020-01-01 | Stop reason: HOSPADM

## 2020-01-01 RX ORDER — INSULIN GLARGINE 100 [IU]/ML
20 INJECTION, SOLUTION SUBCUTANEOUS NIGHTLY
Qty: 1 VIAL | Refills: 3 | DISCHARGE
Start: 2020-01-01

## 2020-01-01 RX ORDER — FERROUS SULFATE 325(65) MG
324 TABLET ORAL
Status: DISCONTINUED | OUTPATIENT
Start: 2020-01-01 | End: 2020-01-01 | Stop reason: HOSPADM

## 2020-01-01 RX ORDER — PROMETHAZINE HYDROCHLORIDE 25 MG/ML
25 INJECTION, SOLUTION INTRAMUSCULAR; INTRAVENOUS PRN
Status: DISCONTINUED | OUTPATIENT
Start: 2020-01-01 | End: 2020-01-01 | Stop reason: HOSPADM

## 2020-01-01 RX ORDER — SODIUM CHLORIDE 0.9 % (FLUSH) 0.9 %
10 SYRINGE (ML) INJECTION ONCE
Status: COMPLETED | OUTPATIENT
Start: 2020-01-01 | End: 2020-01-01

## 2020-01-01 RX ORDER — ESCITALOPRAM OXALATE 5 MG/1
5 TABLET ORAL DAILY
Status: ON HOLD | COMMUNITY
End: 2020-01-01 | Stop reason: HOSPADM

## 2020-01-01 RX ORDER — LEVETIRACETAM 500 MG/1
500 TABLET ORAL 2 TIMES DAILY
Qty: 60 TABLET | Refills: 3 | DISCHARGE
Start: 2020-01-01

## 2020-01-01 RX ORDER — PROPOFOL 10 MG/ML
INJECTION, EMULSION INTRAVENOUS PRN
Status: DISCONTINUED | OUTPATIENT
Start: 2020-01-01 | End: 2020-01-01

## 2020-01-01 RX ORDER — DONEPEZIL HYDROCHLORIDE 5 MG/1
5 TABLET, FILM COATED ORAL NIGHTLY
Status: ON HOLD | COMMUNITY
End: 2020-01-01 | Stop reason: HOSPADM

## 2020-01-01 RX ORDER — PROPOFOL 10 MG/ML
INJECTION, EMULSION INTRAVENOUS PRN
Status: DISCONTINUED | OUTPATIENT
Start: 2020-01-01 | End: 2020-01-01 | Stop reason: SDUPTHER

## 2020-01-01 RX ORDER — INSULIN GLARGINE 100 [IU]/ML
20 INJECTION, SOLUTION SUBCUTANEOUS NIGHTLY
Status: DISCONTINUED | OUTPATIENT
Start: 2020-01-01 | End: 2020-01-01 | Stop reason: HOSPADM

## 2020-01-01 RX ORDER — ATENOLOL 25 MG/1
12.5 TABLET ORAL DAILY
Status: DISCONTINUED | OUTPATIENT
Start: 2020-01-01 | End: 2020-01-01 | Stop reason: HOSPADM

## 2020-01-01 RX ORDER — POLYETHYLENE GLYCOL 3350 17 G/17G
17 POWDER, FOR SOLUTION ORAL DAILY PRN
Qty: 527 G | Refills: 1 | DISCHARGE
Start: 2020-01-01 | End: 2020-01-01

## 2020-01-01 RX ORDER — SODIUM CHLORIDE 9 MG/ML
INJECTION, SOLUTION INTRAVENOUS CONTINUOUS
Status: DISCONTINUED | OUTPATIENT
Start: 2020-01-01 | End: 2020-01-01 | Stop reason: HOSPADM

## 2020-01-01 RX ORDER — FLUTICASONE PROPIONATE 50 MCG
1 SPRAY, SUSPENSION (ML) NASAL 2 TIMES DAILY
Status: DISCONTINUED | OUTPATIENT
Start: 2020-01-01 | End: 2020-01-01 | Stop reason: HOSPADM

## 2020-01-01 RX ORDER — SODIUM CHLORIDE 0.9 % (FLUSH) 0.9 %
10 SYRINGE (ML) INJECTION EVERY 12 HOURS SCHEDULED
Status: DISCONTINUED | OUTPATIENT
Start: 2020-01-01 | End: 2020-01-01 | Stop reason: HOSPADM

## 2020-01-01 RX ORDER — GUAIFENESIN 100 MG/5ML
200 SOLUTION ORAL EVERY 6 HOURS PRN
Status: ON HOLD | COMMUNITY
End: 2020-01-01 | Stop reason: HOSPADM

## 2020-01-01 RX ORDER — ACETAMINOPHEN 500 MG
1000 TABLET ORAL 3 TIMES DAILY PRN
Status: DISCONTINUED | OUTPATIENT
Start: 2020-01-01 | End: 2020-01-01 | Stop reason: HOSPADM

## 2020-01-01 RX ORDER — PETROLATUM 42 G/100G
OINTMENT TOPICAL 2 TIMES DAILY PRN
Status: DISCONTINUED | OUTPATIENT
Start: 2020-01-01 | End: 2020-01-01 | Stop reason: HOSPADM

## 2020-01-01 RX ORDER — DEXTROSE MONOHYDRATE 50 MG/ML
100 INJECTION, SOLUTION INTRAVENOUS PRN
Status: DISCONTINUED | OUTPATIENT
Start: 2020-01-01 | End: 2020-01-01 | Stop reason: HOSPADM

## 2020-01-01 RX ORDER — CLONIDINE HYDROCHLORIDE 0.1 MG/1
0.1 TABLET ORAL 3 TIMES DAILY
Status: DISCONTINUED | OUTPATIENT
Start: 2020-01-01 | End: 2020-01-01 | Stop reason: HOSPADM

## 2020-01-01 RX ORDER — LEVOFLOXACIN 500 MG/1
500 TABLET, FILM COATED ORAL DAILY
Qty: 10 TABLET | Refills: 0 | Status: SHIPPED | OUTPATIENT
Start: 2020-01-01 | End: 2020-01-01

## 2020-01-01 RX ORDER — ONDANSETRON 2 MG/ML
INJECTION INTRAMUSCULAR; INTRAVENOUS PRN
Status: DISCONTINUED | OUTPATIENT
Start: 2020-01-01 | End: 2020-01-01 | Stop reason: SDUPTHER

## 2020-01-01 RX ORDER — MORPHINE SULFATE 2 MG/ML
1 INJECTION, SOLUTION INTRAMUSCULAR; INTRAVENOUS EVERY 5 MIN PRN
Status: DISCONTINUED | OUTPATIENT
Start: 2020-01-01 | End: 2020-01-01 | Stop reason: HOSPADM

## 2020-01-01 RX ORDER — MEPERIDINE HYDROCHLORIDE 25 MG/ML
12.5 INJECTION INTRAMUSCULAR; INTRAVENOUS; SUBCUTANEOUS EVERY 5 MIN PRN
Status: DISCONTINUED | OUTPATIENT
Start: 2020-01-01 | End: 2020-01-01 | Stop reason: HOSPADM

## 2020-01-01 RX ORDER — INSULIN GLARGINE 100 [IU]/ML
13 INJECTION, SOLUTION SUBCUTANEOUS NIGHTLY
Status: ON HOLD | COMMUNITY
End: 2020-01-01 | Stop reason: HOSPADM

## 2020-01-01 RX ORDER — MORPHINE SULFATE 2 MG/ML
2 INJECTION, SOLUTION INTRAMUSCULAR; INTRAVENOUS EVERY 5 MIN PRN
Status: DISCONTINUED | OUTPATIENT
Start: 2020-01-01 | End: 2020-01-01 | Stop reason: HOSPADM

## 2020-01-01 RX ORDER — METOPROLOL TARTRATE 5 MG/5ML
INJECTION INTRAVENOUS PRN
Status: DISCONTINUED | OUTPATIENT
Start: 2020-01-01 | End: 2020-01-01 | Stop reason: SDUPTHER

## 2020-01-01 RX ORDER — SUCCINYLCHOLINE/SOD CL,ISO/PF 200MG/10ML
SYRINGE (ML) INTRAVENOUS PRN
Status: DISCONTINUED | OUTPATIENT
Start: 2020-01-01 | End: 2020-01-01 | Stop reason: SDUPTHER

## 2020-01-01 RX ADMIN — FAMOTIDINE 20 MG: 20 TABLET, FILM COATED ORAL at 21:59

## 2020-01-01 RX ADMIN — LEVETIRACETAM 500 MG: 500 TABLET ORAL at 21:59

## 2020-01-01 RX ADMIN — ROCURONIUM BROMIDE 30 MG: 10 INJECTION, SOLUTION INTRAVENOUS at 11:01

## 2020-01-01 RX ADMIN — Medication 3 MG: at 16:48

## 2020-01-01 RX ADMIN — LEVETIRACETAM 500 MG: 500 TABLET ORAL at 22:13

## 2020-01-01 RX ADMIN — CLONIDINE HYDROCHLORIDE 0.1 MG: 0.1 TABLET ORAL at 20:13

## 2020-01-01 RX ADMIN — SODIUM CHLORIDE: 9 INJECTION, SOLUTION INTRAVENOUS at 16:07

## 2020-01-01 RX ADMIN — SODIUM CHLORIDE, PRESERVATIVE FREE 10 ML: 5 INJECTION INTRAVENOUS at 06:36

## 2020-01-01 RX ADMIN — CLONIDINE HYDROCHLORIDE 0.1 MG: 0.1 TABLET ORAL at 08:19

## 2020-01-01 RX ADMIN — LEVETIRACETAM 500 MG: 500 TABLET ORAL at 10:23

## 2020-01-01 RX ADMIN — CLONIDINE HYDROCHLORIDE 0.1 MG: 0.1 TABLET ORAL at 09:57

## 2020-01-01 RX ADMIN — CLONIDINE HYDROCHLORIDE 0.1 MG: 0.1 TABLET ORAL at 14:28

## 2020-01-01 RX ADMIN — CLONIDINE HYDROCHLORIDE 0.1 MG: 0.1 TABLET ORAL at 10:23

## 2020-01-01 RX ADMIN — METOPROLOL TARTRATE 2 MG: 5 INJECTION INTRAVENOUS at 11:13

## 2020-01-01 RX ADMIN — FAMOTIDINE 20 MG: 20 TABLET, FILM COATED ORAL at 22:13

## 2020-01-01 RX ADMIN — LEVETIRACETAM 500 MG: 500 TABLET ORAL at 22:17

## 2020-01-01 RX ADMIN — LIDOCAINE HYDROCHLORIDE 40 MG: 20 INJECTION, SOLUTION INTRAVENOUS at 11:00

## 2020-01-01 RX ADMIN — INSULIN GLARGINE 20 UNITS: 100 INJECTION, SOLUTION SUBCUTANEOUS at 21:16

## 2020-01-01 RX ADMIN — PROPOFOL 20 MG: 10 INJECTION, EMULSION INTRAVENOUS at 12:00

## 2020-01-01 RX ADMIN — Medication 0.6 MG: at 16:48

## 2020-01-01 RX ADMIN — ATENOLOL 12.5 MG: 25 TABLET ORAL at 08:19

## 2020-01-01 RX ADMIN — FLUTICASONE PROPIONATE 1 SPRAY: 50 SPRAY, METERED NASAL at 21:28

## 2020-01-01 RX ADMIN — CLONIDINE HYDROCHLORIDE 0.1 MG: 0.1 TABLET ORAL at 13:29

## 2020-01-01 RX ADMIN — Medication 50 MCG: at 11:18

## 2020-01-01 RX ADMIN — FLUTICASONE PROPIONATE 1 SPRAY: 50 SPRAY, METERED NASAL at 20:13

## 2020-01-01 RX ADMIN — ATENOLOL 12.5 MG: 25 TABLET ORAL at 09:09

## 2020-01-01 RX ADMIN — CLONIDINE HYDROCHLORIDE 0.1 MG: 0.1 TABLET ORAL at 20:27

## 2020-01-01 RX ADMIN — LIDOCAINE HYDROCHLORIDE 100 MG: 20 INJECTION, SOLUTION INTRAVENOUS at 16:08

## 2020-01-01 RX ADMIN — INSULIN LISPRO 6 UNITS: 100 INJECTION, SOLUTION INTRAVENOUS; SUBCUTANEOUS at 20:27

## 2020-01-01 RX ADMIN — INSULIN LISPRO 2 UNITS: 100 INJECTION, SOLUTION INTRAVENOUS; SUBCUTANEOUS at 21:20

## 2020-01-01 RX ADMIN — INSULIN LISPRO 4 UNITS: 100 INJECTION, SOLUTION INTRAVENOUS; SUBCUTANEOUS at 12:01

## 2020-01-01 RX ADMIN — CLONIDINE HYDROCHLORIDE 0.1 MG: 0.1 TABLET ORAL at 22:13

## 2020-01-01 RX ADMIN — SODIUM CHLORIDE, PRESERVATIVE FREE 10 ML: 5 INJECTION INTRAVENOUS at 21:28

## 2020-01-01 RX ADMIN — FERROUS SULFATE TAB 325 MG (65 MG ELEMENTAL FE) 324 MG: 325 (65 FE) TAB at 09:10

## 2020-01-01 RX ADMIN — CLONIDINE HYDROCHLORIDE 0.1 MG: 0.1 TABLET ORAL at 09:55

## 2020-01-01 RX ADMIN — MAGNESIUM GLUCONATE 500 MG ORAL TABLET 400 MG: 500 TABLET ORAL at 08:19

## 2020-01-01 RX ADMIN — ATENOLOL 12.5 MG: 25 TABLET ORAL at 09:57

## 2020-01-01 RX ADMIN — NALOXONE HYDROCHLORIDE 0.04 MG: 0.4 INJECTION, SOLUTION INTRAMUSCULAR; INTRAVENOUS; SUBCUTANEOUS at 16:55

## 2020-01-01 RX ADMIN — FLUTICASONE PROPIONATE 1 SPRAY: 50 SPRAY, METERED NASAL at 08:20

## 2020-01-01 RX ADMIN — AMLODIPINE BESYLATE 10 MG: 10 TABLET ORAL at 21:15

## 2020-01-01 RX ADMIN — CLONIDINE HYDROCHLORIDE 0.1 MG: 0.1 TABLET ORAL at 21:59

## 2020-01-01 RX ADMIN — PROPOFOL 50 MG: 10 INJECTION, EMULSION INTRAVENOUS at 11:11

## 2020-01-01 RX ADMIN — FAMOTIDINE 20 MG: 20 TABLET, FILM COATED ORAL at 20:13

## 2020-01-01 RX ADMIN — MAGNESIUM GLUCONATE 500 MG ORAL TABLET 400 MG: 500 TABLET ORAL at 10:23

## 2020-01-01 RX ADMIN — CALCITRIOL 0.25 MCG: 0.25 CAPSULE ORAL at 10:22

## 2020-01-01 RX ADMIN — ATENOLOL 12.5 MG: 25 TABLET ORAL at 08:16

## 2020-01-01 RX ADMIN — Medication 100 MCG: at 16:16

## 2020-01-01 RX ADMIN — IOPAMIDOL 110 ML: 755 INJECTION, SOLUTION INTRAVENOUS at 21:20

## 2020-01-01 RX ADMIN — ATORVASTATIN CALCIUM 80 MG: 80 TABLET, FILM COATED ORAL at 22:13

## 2020-01-01 RX ADMIN — AMLODIPINE BESYLATE 10 MG: 10 TABLET ORAL at 22:17

## 2020-01-01 RX ADMIN — ATORVASTATIN CALCIUM 80 MG: 80 TABLET, FILM COATED ORAL at 20:13

## 2020-01-01 RX ADMIN — FAMOTIDINE 20 MG: 20 TABLET, FILM COATED ORAL at 21:15

## 2020-01-01 RX ADMIN — ROCURONIUM BROMIDE 20 MG: 10 INJECTION, SOLUTION INTRAVENOUS at 11:40

## 2020-01-01 RX ADMIN — Medication 0.2 MG: at 11:23

## 2020-01-01 RX ADMIN — FERROUS SULFATE TAB 325 MG (65 MG ELEMENTAL FE) 324 MG: 325 (65 FE) TAB at 08:20

## 2020-01-01 RX ADMIN — IOHEXOL 50 ML: 240 INJECTION, SOLUTION INTRATHECAL; INTRAVASCULAR; INTRAVENOUS; ORAL at 21:20

## 2020-01-01 RX ADMIN — FENTANYL CITRATE 50 MCG: 50 INJECTION, SOLUTION INTRAMUSCULAR; INTRAVENOUS at 11:11

## 2020-01-01 RX ADMIN — INSULIN LISPRO 4 UNITS: 100 INJECTION, SOLUTION INTRAVENOUS; SUBCUTANEOUS at 22:17

## 2020-01-01 RX ADMIN — LEVETIRACETAM 500 MG: 500 TABLET ORAL at 21:27

## 2020-01-01 RX ADMIN — INSULIN GLARGINE 20 UNITS: 100 INJECTION, SOLUTION SUBCUTANEOUS at 20:28

## 2020-01-01 RX ADMIN — LEVETIRACETAM 500 MG: 500 TABLET ORAL at 08:20

## 2020-01-01 RX ADMIN — FLUTICASONE PROPIONATE 1 SPRAY: 50 SPRAY, METERED NASAL at 21:59

## 2020-01-01 RX ADMIN — FAMOTIDINE 20 MG: 20 TABLET, FILM COATED ORAL at 22:17

## 2020-01-01 RX ADMIN — Medication 120 MG: at 16:08

## 2020-01-01 RX ADMIN — Medication 50 MCG: at 16:42

## 2020-01-01 RX ADMIN — DEXAMETHASONE SODIUM PHOSPHATE 10 MG: 10 INJECTION INTRAMUSCULAR; INTRAVENOUS at 16:08

## 2020-01-01 RX ADMIN — DEXAMETHASONE SODIUM PHOSPHATE 10 MG: 10 INJECTION INTRAMUSCULAR; INTRAVENOUS at 11:01

## 2020-01-01 RX ADMIN — ATORVASTATIN CALCIUM 80 MG: 80 TABLET, FILM COATED ORAL at 20:27

## 2020-01-01 RX ADMIN — FLUTICASONE PROPIONATE 1 SPRAY: 50 SPRAY, METERED NASAL at 09:10

## 2020-01-01 RX ADMIN — PROPOFOL 100 MCG/KG/MIN: 10 INJECTION, EMULSION INTRAVENOUS at 11:00

## 2020-01-01 RX ADMIN — LEVETIRACETAM 500 MG: 500 TABLET ORAL at 09:55

## 2020-01-01 RX ADMIN — FLUTICASONE PROPIONATE 1 SPRAY: 50 SPRAY, METERED NASAL at 08:16

## 2020-01-01 RX ADMIN — FERROUS SULFATE TAB 325 MG (65 MG ELEMENTAL FE) 324 MG: 325 (65 FE) TAB at 08:15

## 2020-01-01 RX ADMIN — FENTANYL CITRATE 50 MCG: 50 INJECTION, SOLUTION INTRAMUSCULAR; INTRAVENOUS at 11:00

## 2020-01-01 RX ADMIN — Medication 2 MG: at 16:52

## 2020-01-01 RX ADMIN — LEVETIRACETAM 500 MG: 500 TABLET ORAL at 20:27

## 2020-01-01 RX ADMIN — CLONIDINE HYDROCHLORIDE 0.1 MG: 0.1 TABLET ORAL at 21:15

## 2020-01-01 RX ADMIN — AMLODIPINE BESYLATE 10 MG: 10 TABLET ORAL at 22:13

## 2020-01-01 RX ADMIN — CALCITRIOL 0.25 MCG: 0.25 CAPSULE ORAL at 09:55

## 2020-01-01 RX ADMIN — FERROUS SULFATE TAB 325 MG (65 MG ELEMENTAL FE) 324 MG: 325 (65 FE) TAB at 09:57

## 2020-01-01 RX ADMIN — CALCITRIOL 0.25 MCG: 0.25 CAPSULE ORAL at 08:19

## 2020-01-01 RX ADMIN — Medication 50 MCG: at 11:21

## 2020-01-01 RX ADMIN — INSULIN LISPRO 4 UNITS: 100 INJECTION, SOLUTION INTRAVENOUS; SUBCUTANEOUS at 11:28

## 2020-01-01 RX ADMIN — PROPOFOL 20 MG: 10 INJECTION, EMULSION INTRAVENOUS at 11:41

## 2020-01-01 RX ADMIN — LEVETIRACETAM 500 MG: 500 TABLET ORAL at 08:15

## 2020-01-01 RX ADMIN — SUGAMMADEX 200 MG: 100 INJECTION, SOLUTION INTRAVENOUS at 12:11

## 2020-01-01 RX ADMIN — MAGNESIUM GLUCONATE 500 MG ORAL TABLET 400 MG: 500 TABLET ORAL at 09:55

## 2020-01-01 RX ADMIN — CLONIDINE HYDROCHLORIDE 0.1 MG: 0.1 TABLET ORAL at 13:42

## 2020-01-01 RX ADMIN — FLUTICASONE PROPIONATE 1 SPRAY: 50 SPRAY, METERED NASAL at 10:22

## 2020-01-01 RX ADMIN — ATORVASTATIN CALCIUM 80 MG: 80 TABLET, FILM COATED ORAL at 21:15

## 2020-01-01 RX ADMIN — CLONIDINE HYDROCHLORIDE 0.1 MG: 0.1 TABLET ORAL at 22:25

## 2020-01-01 RX ADMIN — SODIUM CHLORIDE: 9 INJECTION, SOLUTION INTRAVENOUS at 10:53

## 2020-01-01 RX ADMIN — SUGAMMADEX 200 MG: 100 INJECTION, SOLUTION INTRAVENOUS at 12:17

## 2020-01-01 RX ADMIN — DEXTROSE MONOHYDRATE 100 ML/HR: 50 INJECTION, SOLUTION INTRAVENOUS at 10:37

## 2020-01-01 RX ADMIN — AMLODIPINE BESYLATE 10 MG: 10 TABLET ORAL at 21:59

## 2020-01-01 RX ADMIN — FENTANYL CITRATE 100 MCG: 50 INJECTION, SOLUTION INTRAMUSCULAR; INTRAVENOUS at 16:08

## 2020-01-01 RX ADMIN — Medication 0.4 MG: at 16:52

## 2020-01-01 RX ADMIN — FERROUS SULFATE TAB 325 MG (65 MG ELEMENTAL FE) 324 MG: 325 (65 FE) TAB at 09:55

## 2020-01-01 RX ADMIN — PROPOFOL 140 MG: 10 INJECTION, EMULSION INTRAVENOUS at 11:00

## 2020-01-01 RX ADMIN — CLONIDINE HYDROCHLORIDE 0.1 MG: 0.1 TABLET ORAL at 13:03

## 2020-01-01 RX ADMIN — ATENOLOL 12.5 MG: 25 TABLET ORAL at 09:56

## 2020-01-01 RX ADMIN — INSULIN GLARGINE 20 UNITS: 100 INJECTION, SOLUTION SUBCUTANEOUS at 22:26

## 2020-01-01 RX ADMIN — INSULIN LISPRO 2 UNITS: 100 INJECTION, SOLUTION INTRAVENOUS; SUBCUTANEOUS at 11:55

## 2020-01-01 RX ADMIN — INSULIN GLARGINE 20 UNITS: 100 INJECTION, SOLUTION SUBCUTANEOUS at 22:15

## 2020-01-01 RX ADMIN — AMLODIPINE BESYLATE 10 MG: 10 TABLET ORAL at 20:27

## 2020-01-01 RX ADMIN — CLONIDINE HYDROCHLORIDE 0.1 MG: 0.1 TABLET ORAL at 09:09

## 2020-01-01 RX ADMIN — ONDANSETRON HYDROCHLORIDE 4 MG: 2 INJECTION, SOLUTION INTRAMUSCULAR; INTRAVENOUS at 12:00

## 2020-01-01 RX ADMIN — AMLODIPINE BESYLATE 10 MG: 10 TABLET ORAL at 20:13

## 2020-01-01 RX ADMIN — CALCITRIOL 0.25 MCG: 0.25 CAPSULE ORAL at 08:16

## 2020-01-01 RX ADMIN — LEVETIRACETAM 500 MG: 500 TABLET ORAL at 20:13

## 2020-01-01 RX ADMIN — SODIUM CHLORIDE: 9 INJECTION, SOLUTION INTRAVENOUS at 16:48

## 2020-01-01 RX ADMIN — PROPOFOL 150 MCG/KG/MIN: 10 INJECTION, EMULSION INTRAVENOUS at 16:13

## 2020-01-01 RX ADMIN — PROPOFOL 150 MG: 10 INJECTION, EMULSION INTRAVENOUS at 16:08

## 2020-01-01 RX ADMIN — MAGNESIUM GLUCONATE 500 MG ORAL TABLET 400 MG: 500 TABLET ORAL at 09:09

## 2020-01-01 RX ADMIN — FLUTICASONE PROPIONATE 1 SPRAY: 50 SPRAY, METERED NASAL at 22:13

## 2020-01-01 RX ADMIN — ATORVASTATIN CALCIUM 80 MG: 80 TABLET, FILM COATED ORAL at 22:17

## 2020-01-01 RX ADMIN — INSULIN LISPRO 2 UNITS: 100 INJECTION, SOLUTION INTRAVENOUS; SUBCUTANEOUS at 17:07

## 2020-01-01 RX ADMIN — ROCURONIUM BROMIDE 30 MG: 10 INJECTION, SOLUTION INTRAVENOUS at 16:13

## 2020-01-01 RX ADMIN — MAGNESIUM GLUCONATE 500 MG ORAL TABLET 400 MG: 500 TABLET ORAL at 08:15

## 2020-01-01 RX ADMIN — SODIUM CHLORIDE, PRESERVATIVE FREE 10 ML: 5 INJECTION INTRAVENOUS at 13:04

## 2020-01-01 RX ADMIN — LEVETIRACETAM 500 MG: 500 TABLET ORAL at 09:10

## 2020-01-01 RX ADMIN — MAGNESIUM GLUCONATE 500 MG ORAL TABLET 400 MG: 500 TABLET ORAL at 09:57

## 2020-01-01 RX ADMIN — Medication 15 G: at 09:41

## 2020-01-01 RX ADMIN — GADOTERIDOL 14 ML: 279.3 INJECTION, SOLUTION INTRAVENOUS at 14:56

## 2020-01-01 RX ADMIN — CLONIDINE HYDROCHLORIDE 0.1 MG: 0.1 TABLET ORAL at 08:16

## 2020-01-01 RX ADMIN — LEVETIRACETAM 500 MG: 500 TABLET ORAL at 09:56

## 2020-01-01 RX ADMIN — Medication 50 MCG: at 16:39

## 2020-01-01 RX ADMIN — FAMOTIDINE 20 MG: 20 TABLET, FILM COATED ORAL at 20:27

## 2020-01-01 RX ADMIN — ONDANSETRON HYDROCHLORIDE 4 MG: 2 INJECTION, SOLUTION INTRAMUSCULAR; INTRAVENOUS at 16:31

## 2020-01-01 RX ADMIN — CLONIDINE HYDROCHLORIDE 0.1 MG: 0.1 TABLET ORAL at 16:07

## 2020-01-01 RX ADMIN — ATORVASTATIN CALCIUM 80 MG: 80 TABLET, FILM COATED ORAL at 21:59

## 2020-01-01 ASSESSMENT — PAIN SCALES - GENERAL
PAINLEVEL_OUTOF10: 0
PAINLEVEL_OUTOF10: 9
PAINLEVEL_OUTOF10: 0

## 2020-01-01 ASSESSMENT — PULMONARY FUNCTION TESTS
PIF_VALUE: 21
PIF_VALUE: 21
PIF_VALUE: 20
PIF_VALUE: 17
PIF_VALUE: 20
PIF_VALUE: 20
PIF_VALUE: 3
PIF_VALUE: 20
PIF_VALUE: 0
PIF_VALUE: 22
PIF_VALUE: 20
PIF_VALUE: 1
PIF_VALUE: 2
PIF_VALUE: 20
PIF_VALUE: 20
PIF_VALUE: 0
PIF_VALUE: 14
PIF_VALUE: 15
PIF_VALUE: 3
PIF_VALUE: 20
PIF_VALUE: 5
PIF_VALUE: 24
PIF_VALUE: 4
PIF_VALUE: 3
PIF_VALUE: 20
PIF_VALUE: 24
PIF_VALUE: 3
PIF_VALUE: 22
PIF_VALUE: 0
PIF_VALUE: 21
PIF_VALUE: 23
PIF_VALUE: 23
PIF_VALUE: 2
PIF_VALUE: 22
PIF_VALUE: 24
PIF_VALUE: 2
PIF_VALUE: 22
PIF_VALUE: 16
PIF_VALUE: 20
PIF_VALUE: 2
PIF_VALUE: 20
PIF_VALUE: 13
PIF_VALUE: 22
PIF_VALUE: 20
PIF_VALUE: 3
PIF_VALUE: 6
PIF_VALUE: 22
PIF_VALUE: 14
PIF_VALUE: 24
PIF_VALUE: 20
PIF_VALUE: 2
PIF_VALUE: 20
PIF_VALUE: 26
PIF_VALUE: 12
PIF_VALUE: 20
PIF_VALUE: 14
PIF_VALUE: 21
PIF_VALUE: 13
PIF_VALUE: 25
PIF_VALUE: 20
PIF_VALUE: 22
PIF_VALUE: 0
PIF_VALUE: 20
PIF_VALUE: 20
PIF_VALUE: 2
PIF_VALUE: 22
PIF_VALUE: 20
PIF_VALUE: 22
PIF_VALUE: 1
PIF_VALUE: 2
PIF_VALUE: 24
PIF_VALUE: 21
PIF_VALUE: 20
PIF_VALUE: 8
PIF_VALUE: 22
PIF_VALUE: 0
PIF_VALUE: 13
PIF_VALUE: 12
PIF_VALUE: 3
PIF_VALUE: 24
PIF_VALUE: 1
PIF_VALUE: 24
PIF_VALUE: 15
PIF_VALUE: 3
PIF_VALUE: 3
PIF_VALUE: 4
PIF_VALUE: 0
PIF_VALUE: 20
PIF_VALUE: 20
PIF_VALUE: 13
PIF_VALUE: 16
PIF_VALUE: 20
PIF_VALUE: 3
PIF_VALUE: 2
PIF_VALUE: 20
PIF_VALUE: 22
PIF_VALUE: 4
PIF_VALUE: 20
PIF_VALUE: 20
PIF_VALUE: 24
PIF_VALUE: 22
PIF_VALUE: 22
PIF_VALUE: 16
PIF_VALUE: 24
PIF_VALUE: 16
PIF_VALUE: 20
PIF_VALUE: 22
PIF_VALUE: 22
PIF_VALUE: 14
PIF_VALUE: 21
PIF_VALUE: 22
PIF_VALUE: 22
PIF_VALUE: 25
PIF_VALUE: 20
PIF_VALUE: 22
PIF_VALUE: 21
PIF_VALUE: 14
PIF_VALUE: 22
PIF_VALUE: 20
PIF_VALUE: 13
PIF_VALUE: 22
PIF_VALUE: 20
PIF_VALUE: 22
PIF_VALUE: 20
PIF_VALUE: 22
PIF_VALUE: 7
PIF_VALUE: 20
PIF_VALUE: 20
PIF_VALUE: 22
PIF_VALUE: 22
PIF_VALUE: 2
PIF_VALUE: 20
PIF_VALUE: 16
PIF_VALUE: 13
PIF_VALUE: 15
PIF_VALUE: 27
PIF_VALUE: 20
PIF_VALUE: 22
PIF_VALUE: 22
PIF_VALUE: 20
PIF_VALUE: 20
PIF_VALUE: 3
PIF_VALUE: 22

## 2020-01-01 ASSESSMENT — LIFESTYLE VARIABLES
SMOKING_STATUS: 1
SMOKING_STATUS: 1

## 2020-01-01 ASSESSMENT — PAIN - FUNCTIONAL ASSESSMENT
PAIN_FUNCTIONAL_ASSESSMENT: FACES
PAIN_FUNCTIONAL_ASSESSMENT: FACES

## 2020-01-01 ASSESSMENT — PAIN DESCRIPTION - LOCATION
LOCATION: LEG
LOCATION: CHEST

## 2020-01-01 ASSESSMENT — PAIN DESCRIPTION - PAIN TYPE
TYPE: ACUTE PAIN
TYPE: SURGICAL PAIN

## 2020-01-01 ASSESSMENT — PAIN DESCRIPTION - ORIENTATION: ORIENTATION: RIGHT;LEFT

## 2020-08-13 PROBLEM — G93.89 BRAIN MASS: Status: ACTIVE | Noted: 2020-01-01

## 2020-08-13 NOTE — CARE COORDINATION
SOCIAL WORK/DISCHARGE PLANNING;  Pt is a long term resident at [facility]. He has been there since 2017. I spoke with pt in room and plan is to return to [facility] when medically ready. Per liaison, pt is a medicaid bed hold and can return. Pt reports his son Rochelle Azar is his POA.   Milagros Mae [facility]  [phone]

## 2020-08-13 NOTE — ED PROVIDER NOTES
HPI:  8/12/20, Time: 10:26 PM EDT         Shawnee Stallings. is a 72 y.o. male presenting to the ED for hx expressive aphasia with right upper extremity weakness, beginning days ago. The complaint has been persistent, moderate in severity, and worsened by nothing. Patient reporting his right arm was weak and it started about 3 or 4 days ago. Patient reporting he had expressive aphasia as well today. Patient reporting no history of stroke. He reports no chest pain or difficulty breathing there is no abdominal pain or vomiting there is no diarrhea. There is no headache. ROS:   Pertinent positives and negatives are stated within HPI, all other systems reviewed and are negative.  --------------------------------------------- PAST HISTORY ---------------------------------------------  Past Medical History:  has a past medical history of Anemia, Arthritis, Atherosclerosis of native arteries of extremity with rest pain ([address] Back pain, chronic, Cerebral artery occlusion with cerebral infarction ([address] CKD (chronic kidney disease) stage 3, GFR 30-59 ml/min ([address] Cognitive communication deficit, Depression, Diabetes mellitus ([address] Diabetic ketoacidosis without coma associated with type 2 diabetes mellitus ([address] Diabetic ulcer of right foot associated with type 2 diabetes mellitus ([address] Difficulty walking, Gastrointestinal hemorrhage, Hypertension, Neuromuscular dysfunction of bladder, Onychomycosis, Osteomyelitis of toe of left foot ([address] Osteomyelitis of toe of right foot ([address] Other symbolic dysfunctions (CODE), Pulmonary nodule, PVD (peripheral vascular disease) with claudication ([address] Right sided weakness, Stroke ([address] and Tobacco dependence. Past Surgical History:  has a past surgical history that includes back surgery; ECHO Compl W Dop Color Flow (5/18/2012); other surgical history (11/28/15); Cystoscopy (4/8/16); other surgical history (4/8/16);  Upper gastrointestinal endoscopy (03/09/2018); pr orders placed or performed during the hospital encounter of 08/12/20   CBC auto differential   Result Value Ref Range    WBC 5.1 4.5 - 11.5 E9/L    RBC 3.09 (L) 3.80 - 5.80 E12/L    Hemoglobin 10.5 (L) 12.5 - 16.5 g/dL    Hematocrit 32.9 (L) 37.0 - 54.0 %    .5 (H) 80.0 - 99.9 fL    MCH 34.0 26.0 - 35.0 pg    MCHC 31.9 (L) 32.0 - 34.5 %    RDW 15.6 (H) 11.5 - 15.0 fL    Platelets 086 257 - 696 E9/L    MPV 8.9 7.0 - 12.0 fL    Neutrophils % 46.1 43.0 - 80.0 %    Immature Granulocytes % 0.4 0.0 - 5.0 %    Lymphocytes % 33.5 20.0 - 42.0 %    Monocytes % 9.1 2.0 - 12.0 %    Eosinophils % 9.7 (H) 0.0 - 6.0 %    Basophils % 1.2 0.0 - 2.0 %    Neutrophils Absolute 2.34 1.80 - 7.30 E9/L    Immature Granulocytes # 0.02 E9/L    Lymphocytes Absolute 1.70 1.50 - 4.00 E9/L    Monocytes Absolute 0.46 0.10 - 0.95 E9/L    Eosinophils Absolute 0.49 0.05 - 0.50 E9/L    Basophils Absolute 0.06 0.00 - 0.20 E9/L   Comprehensive Metabolic Panel   Result Value Ref Range    Sodium 137 132 - 146 mmol/L    Potassium 4.9 3.5 - 5.0 mmol/L    Chloride 102 98 - 107 mmol/L    CO2 21 (L) 22 - 29 mmol/L    Anion Gap 14 7 - 16 mmol/L    Glucose 130 (H) 74 - 99 mg/dL    BUN 37 (H) 8 - 23 mg/dL    CREATININE 1.9 (H) 0.7 - 1.2 mg/dL    GFR Non-African American 43 >=60 mL/min/1.73    GFR African American 43     Calcium 9.5 8.6 - 10.2 mg/dL    Total Protein 7.2 6.4 - 8.3 g/dL    Alb 3.3 (L) 3.5 - 5.2 g/dL    Total Bilirubin 0.3 0.0 - 1.2 mg/dL    Alkaline Phosphatase 107 40 - 129 U/L    ALT 14 0 - 40 U/L    AST 20 0 - 39 U/L   Troponin   Result Value Ref Range    Troponin 0.03 0.00 - 0.03 ng/mL   EKG 12 Lead   Result Value Ref Range    Ventricular Rate 61 BPM    Atrial Rate 61 BPM    P-R Interval 246 ms    QRS Duration 80 ms    Q-T Interval 408 ms    QTc Calculation (Bazett) 410 ms    P Axis 73 degrees    R Axis 63 degrees    T Axis 74 degrees       RADIOLOGY:  Interpreted by Radiologist.  CT HEAD WO CONTRAST   Final Result      1.  Chronic left MCA stroke with large areas of encephalomalacic change   involving left insular cortex, left temporal lobe, left frontal lobe,   and anterior left parietal lobe. 2. New edema is associated with posterior left frontal lobe   surrounding a cortical mass measuring 1.6 x 1.6 cm. This finding might   suggest new intracranial metastases. MRI with and without contrast   could be helpful for further evaluation. ALERT:  THIS IS AN ABNORMAL REPORT. XR CHEST PORTABLE   Final Result      No airspace opacities or pleural effusion. EKG:  This EKG is signed and interpreted by me. Rate: 61  Rhythm: Sinus  Interpretation: no acute changes  Comparison: no previous EKG available          ------------------------- NURSING NOTES AND VITALS REVIEWED ---------------------------   The nursing notes within the ED encounter and vital signs as below have been reviewed by myself. BP (!) 150/78   Pulse 66   Temp 96.6 °F (35.9 °C) (Temporal)   Resp 16   SpO2 99%   Oxygen Saturation Interpretation: Normal    The patients available past medical records and past encounters were reviewed. ------------------------------ ED COURSE/MEDICAL DECISION MAKING----------------------  Medications - No data to display          Medical Decision Making:    Did review nurses notes patient's weakness in his right upper extremity not his right lower extremity. Patient had expressive aphasia at nursing home per report. That has since resolved he has no facial droop. He does have right upper extremity weakness here CT reviewed and shows mass. Patient was made aware of findings    Re-Evaluations:             Re-evaluation. Patients symptoms show no change  Patient rechecked and unchanged. Patient in no acute distress. Consultations:             Internal medicine    Critical Care:          This patient's ED course included: a personal history and physicial eaxmination    This patient has been closely monitored

## 2020-08-13 NOTE — H&P
Hospital Medicine History & Physical      PCP: No primary care provider on file. Date of Admission: 8/12/2020    Date of Service: Pt seen/examined on 08/13/2020 and admitted to inpatient service    Hx taken from patient    Chief Complaint: Chronic left MCA stroke with possible brain metastases  History Of Present Illness: Anna Diallo is a 72 y.o. male who presented to the emergency department with a history of expressive aphasia on the right upper extremity weakness that began days ago. He also complained of right arm weakness 3 to 4 days. Patient's CT shows chronic left MCA infarctions. MRI is pending. Patient will be admitted to hospitalist service with neurosurgery in consultation for evaluation and treatment. Patient is awake alert denies headache lightheadedness or dizziness at this time.     Past Medical History:        Diagnosis Date    Anemia     Arthritis     Atherosclerosis of native arteries of extremity with rest pain ([address] 9/23/2015    Back pain, chronic     Cerebral artery occlusion with cerebral infarction (HCC)     CKD (chronic kidney disease) stage 3, GFR 30-59 ml/min ([facility]) 7/31/2018    Cognitive communication deficit     Depression     Diabetes mellitus ([address]     Diabetic ketoacidosis without coma associated with type 2 diabetes mellitus ([address] 4/6/2016    Diabetic ulcer of right foot associated with type 2 diabetes mellitus ([address] 4/8/2016    Difficulty walking     Gastrointestinal hemorrhage     Hypertension     Neuromuscular dysfunction of bladder     with suprapubic cathether in place    Onychomycosis 9/23/2015    Osteomyelitis of toe of left foot ([address] 2018    Osteomyelitis of toe of right foot ([address] 2276    Other symbolic dysfunctions (CODE)     Pulmonary nodule 4/5/2016    PVD (peripheral vascular disease) with claudication ([address] 9/23/2015    Right sided weakness 9/23/2015    Stroke ([address] 04/2016    with expressive aphasia, memory problems, b/l LE weakness, incontinence of stool and urine    Tobacco dependence 9/23/2015       Past Surgical History:        Procedure Laterality Date    BACK SURGERY      COLONOSCOPY      CYSTOSCOPY  4/8/16    Removal of Bladder Stone    ECHO COMPL W DOP COLOR FLOW  5/18/2012         ENDOSCOPY, COLON, DIAGNOSTIC      OTHER SURGICAL HISTORY  11/28/15    lap choley and cholangiogram    OTHER SURGICAL HISTORY  4/8/16    Suprapubic Catheter Insertion    NC COLONOSCOPY FLX DX W/COLLJ SPEC WHEN PFRMD N/A 3/12/2018    COLONOSCOPY performed by Rodri Ayoub MD at [facility] 63 OFFICE/OUTPT VISIT,PROCEDURE ONLY Bilateral 3/19/2018    RESECTION OF BONE CURRETAGE LEFT 1ST DIGIT performed by Luciana Montano DPM at [facility] 46 ENDOSCOPY  03/09/2018       MedicationsPrior to Admission:    Prior to Admission medications    Medication Sig Start Date End Date Taking? Authorizing Provider   loratadine (CLARITIN) 10 MG tablet Take 10 mg by mouth 2 times daily as needed (itching)   Yes Historical Provider, MD   donepezil (ARICEPT) 5 MG tablet Take 5 mg by mouth nightly   Yes Historical Provider, MD   hydrALAZINE (APRESOLINE) 50 MG tablet Take 50 mg by mouth 4 times daily   Yes Historical Provider, MD   insulin glargine (LANTUS) 100 UNIT/ML injection vial Inject 13 Units into the skin nightly   Yes Historical Provider, MD   escitalopram (LEXAPRO) 5 MG tablet Take 5 mg by mouth daily   Yes Historical Provider, MD   guaiFENesin (ROBITUSSIN) 100 MG/5ML SOLN oral solution Take 200 mg by mouth every 6 hours as needed for Cough   Yes Historical Provider, MD   sennosides-docusate sodium (SENOKOT-S) 8.6-50 MG tablet Take 2 tablets by mouth 2 times daily as needed for Constipation   Yes Historical Provider, MD   ammonium lactate (LAC-HYDRIN) 12 % lotion Apply topically as needed for Dry Skin Apply topically as needed.     Historical Provider, MD   clopidogrel (PLAVIX) 75 MG tablet Take 1 tablet by mouth daily confluent areas of hypoattenuation involving the left insular cortex and subinsular white matter including left basal ganglia. Areas of hypoattenuation are also present involving the left frontal lobe, left temporal lobe, and anterior left parietal lobe. New areas of hypoattenuation are seen involving posterior left frontal lobe involving the subcortical white matter with sparing of the cortex. There is a cortical mass involving left frontal lobe measuring approximately 1.6 x 1.6 cm. No acute intracranial hemorrhage. No abnormal extra-axial fluid collections. No hydrocephalus. 1. Chronic left MCA stroke with large areas of encephalomalacic change involving left insular cortex, left temporal lobe, left frontal lobe, and anterior left parietal lobe. 2. New edema is associated with posterior left frontal lobe surrounding a cortical mass measuring 1.6 x 1.6 cm. This finding might suggest new intracranial metastases. MRI with and without contrast could be helpful for further evaluation. ALERT:  THIS IS AN ABNORMAL REPORT. Xr Chest Portable    Result Date: 2020  Patient MRN:  [id number] : 1950 Age: 72 years Gender: Male Order Date:  2020 10:24 PM EXAM: XR CHEST PORTABLE COMPARISON: 2018 INDICATION:  Weakness Weakness FINDINGS: The heart is normal in size. No focal airspace opacity. There is no pleural effusion. There is no pneumothorax. No free air beneath the diaphragms. No airspace opacities or pleural effusion. Xr Eye Foreign Body    Result Date: 2020  Patient MRN: [id number] : 1950 Age:  72 years Gender: Male Order Date: 2020 12:24 PM Exam: XR EYE FOREIGN BODY Number of Images: 2 views Indication: History of ocular metal exposure. Pre-MRI screening. T15.90XA Foreign body, eye, unspecified laterality, initial encounter Comparison: None. Findings: No radiopaque foreign bodies are identified within the orbits. No other significant abnormal findings.      No radiopaque foreign body demonstrated. Mri Brain W Wo Contrast    Result Date: 2020  Patient MRN:  [id number] : 1950 Age: 72 years Gender: Male Order Date:  2020 2:31 PM EXAM: MRI BRAIN W WO CONTRAST INDICATION:  brain mass Verbal to change from Dr. Jose Sandhu to with and without brain mass  COMPARISON: None FINDINGS: PARENCHYMA: Insufflation from chronic left MCA infarction seen, involving the left frontal, parietal and temporal lobes, as well as the insula and subinsular region. There is a 2 cm enhancing mass in the high left posterior frontal lobe resulting in significant vasogenic edema in the frontoparietal white matter. Another 4.5 mm metastatic lesion is seen in the inferior left frontal lobe (series 10, image 17). Another 2 mm lesion in the left frontal operculum (series 10, image 20). A 4 mm lesion is seen in the left cingulate gyrus, posterior to the splenium of corpus callosum (series 10, image 17). A 1.2 x 0.7 cm extra-axial enhancing lesion along the right frontal convexity, which may represent a metastasis or meningioma. VENTRICLES:No hydrocephalus. No extra-axial fluid. CALVARIUM:Calvarium is intact, without fracture or focal lesion. SINUSES: Minimal mucosal thickening in the paranasal and mastoid sinuses. MISCELLANEOUS:No other abnormality identified. 1. Four metastatic lesions in the left cerebral hemisphere, the largest in the high left posterior frontal lobe measuring 2 cm. The dominant 2 cm lesion is associated with significant vasogenic edema. 2. 1.2 x 0.7 cm extra-axial mass along the right frontal convexity, which may represent a meningioma or a dural metastasis. 3. Prominent area of encephalomalacia in the left cerebral hemisphere consistent with chronic MCA infarction.       EKG:        CBC   Recent Labs     20  2250   WBC 5.1   HGB 10.5*   HCT 32.9*         RENAL  Recent Labs     20  2250 20  0739    138   K 4.9 5.0    103   CO2 21* 21* BUN 37* 32*   CREATININE 1.9* 1.8*     LFT'S  Recent Labs     08/12/20  2250   AST 20   ALT 14   BILITOT 0.3   ALKPHOS 107     COAG  Recent Labs     08/13/20  0739   INR 1.2     CARDIAC ENZYMES  No results for input(s): CKTOTAL, CKMB, CKMBINDEX, TROPONINT in the last 72 hours. U/A:   Lab Results   Component Value Date    COLORU Yellow 03/11/2018    WBCUA 10-20 03/11/2018    WBCUA 0-1 10/29/2011    RBCUA 0-1 03/11/2018    RBCUA NONE 09/12/2013    BACTERIA FEW 03/11/2018    CLARITYU Clear 03/11/2018    SPECGRAV 1.010 03/11/2018    LEUKOCYTESUR LARGE 03/11/2018    BLOODU Negative 03/11/2018    GLUCOSEU Negative 03/11/2018    GLUCOSEU >=1000 10/29/2011    AMORPHOUS MODERATE 08/10/2016       ABG  No results found for: [id number], BEART, D4BTFGTE, PHART, THGBART, Marnette Boss, [place]        Active Hospital Problems    Diagnosis Date Noted    Brain mass [G93.89] 08/13/2020       PHYSICIAN CERTIFICATION    I certify that Gama Walters is expected to be hospitalized for greater than 2 midnights based on the following assessment and plan:    ASSESSMENT/PLAN:  1. Chronic left MCA infarction with right hemiparesis- with brain metastases-neurosurgery is following, patient has an MRI that is pending we will continue to monitor  2. COPD history of tobacco dependency- good pulmonary toiletry stable medication nicotine patch if needed  3. Insulin-dependent diabetes mellitus Lantus at night, sliding scale, monitor blood glucose  4. Hypertension-amlodipine, atenolol, clonidine, monitor blood pressure  5.  Seizure precautions-Keppra twice daily      DVT Prophylaxis: scd  Diet: Diet NPO Effective Now Exceptions are: Sips with Meds  Code Status:  When stable        Claudene Even, MD [facility]

## 2020-08-13 NOTE — PROGRESS NOTES
Occupational Therapy  Attempted OT eval, however, pt was off the unit at MRI. Will attempt OT eval at a later time.   Thank you for this referral.  Estrada Vasques, SHIVAM, OTR/L  # [phone]

## 2020-08-13 NOTE — PROGRESS NOTES
Dr Rosemarie Reid that pt has atenolol due and that his HR has been in the 46s.  Per Dr Nadeen carpenter today's dose of atenolol today

## 2020-08-13 NOTE — DISCHARGE INSTR - COC
Continuity of Care Form    Patient Name: Dmitri Tsang. :  1950  MRN:  [id number]    Admit date:  2020  Discharge date:  20    Code Status Order: Prior   Advance Directives:     Admitting Physician:  Kieran Ovalle MD  PCP: No primary care provider on file. Discharging Nurse: [address] Unit/Room#: 0513/6672-G  Discharging Unit Phone Number: [phone]    Emergency Contact:   Extended Emergency Contact Information  [facility] Phone: [phone]  Relation: Other  Secondary Emergency Contact: Albino Osman  Home Phone: [phone]  Relation: Child    Past Surgical History:  Past Surgical History:   Procedure Laterality Date    BACK SURGERY      COLONOSCOPY      CYSTOSCOPY  16    Removal of Bladder Stone    ECHO COMPL W DOP COLOR FLOW  2012         ENDOSCOPY, COLON, DIAGNOSTIC      OTHER SURGICAL HISTORY  11/28/15    lap choley and cholangiogram    OTHER SURGICAL HISTORY  16    Suprapubic Catheter Insertion    [place] COLONOSCOPY FLX DX W/COLLJ SPEC WHEN PFRMD N/A 3/12/2018    COLONOSCOPY performed by Russel Camahco MD at [facility] 63 OFFICE/OUTPT VISIT,PROCEDURE ONLY Bilateral 3/19/2018    RESECTION OF BONE CURRETAGE LEFT 1ST DIGIT performed by Christine Roberto DPM at [facility] 46 ENDOSCOPY  2018       Immunization History: There is no immunization history on file for this patient.     Active Problems:  Patient Active Problem List   Diagnosis Code    DJD (degenerative joint disease) M19.90    Type II diabetes mellitus, uncontrolled ([address] E11.65    Hyperlipoproteinemia E78.5    Neuropathy ([address] G62.9    Depression F32.9    Tobacco dependence F17.200    Pain in lower limb M79.606    PVD (peripheral vascular disease) with claudication (HCC) I73.9    Onychomycosis B35.1    Atherosclerosis of native arteries of extremity with rest pain (HCC) I70.229    Right sided weakness R53.1    Atherosclerosis of native artery of right lower extremity with rest pain ([facility]) I70.221    Pulmonary nodule R91.1    DKA (diabetic ketoacidoses) ([facility]) E11.10    Diabetic ulcer of right foot associated with type 2 diabetes mellitus ([facility] E11.621, L97.519    DKA (diabetic ketoacidoses) ([facility]) E11.10    Disorientation R41.0    Hyponatremia E87.1    Sepsis ([facility]) A41.9    HTN (hypertension) I10    Suprapubic catheter ([facility]) Z93.59    Acute upper GI bleed K92.2    Acute blood loss anemia D62    TERESITA (acute kidney injury) ([facility] N17.9    Hyperkalemia, mild E87.5    DM2 (diabetes mellitus, type 2) ([facility]) E11.9    Hyperlipidemia E78.5    Chronic arterial ischemic stroke, with residual expressive aphasia etc I69.30    Possible dementia F03.90    Incontinence of urine (now s/p SPC) and stool R32    Former smoker Z87.891    Non-healing ulcer ([facility] - of left hallux L98.499    Moderate protein-calorie malnutrition ([facility]) E44.0    PAD (peripheral artery disease) ([facility]) I73.9    Peripheral vascular disease ([facility]) I73.9    CKD (chronic kidney disease) stage 3, GFR 30-59 ml/min ([facility]) N18.3    Peripheral vascular disease of lower extremity with ulceration ([facility]) I73.9, L97.909    Skin ulcer of toe with fat layer exposed ([facility] L97.502    Brain mass G93.89       Isolation/Infection:   Isolation          No Isolation        Patient Infection Status     Infection Onset Added Last Indicated Last Indicated By Review Planned Expiration Resolved Resolved By    None active    Resolved    ESBL (Extended Spectrum Beta Lactamase)  07/16/18 07/16/18 Maddie Padilla RN   08/13/20 MARIKA Ambrose 7/13/2018          Nurse Assessment:  Last Vital Signs: BP (!) 148/70   Pulse 55   Temp 97.2 °F (36.2 °C) (Temporal)   Resp 16   SpO2 98%     Last documented pain score (0-10 scale):    Last Weight:   Wt Readings from Last 1 Encounters:   11/13/18 159 lb (72.1 kg)     Mental Status: oriented and alert    IV Access:  - suprapubic catheter     Nursing Mobility/ADLs:  Walking   Dependent  Transfer  Dependent  Bathing  Dependent  Dressing  Dependent  Toileting  Dependent  Feeding  Assisted  Med Admin  Assisted  Med Delivery   whole    Wound Care Documentation and Therapy:  Wound 08/22/18 Toe (Comment  which one) (Active)   Number of days: 607        Elimination:  Continence:   · Bowel: No  · Bladder: Yes  Urinary Catheter: suprapubic catheter   Colostomy/Ileostomy/Ileal Conduit: No       Date of Last BM: 08/18/20  No intake or output data in the 24 hours ending 08/13/20 0752  No intake/output data recorded. Safety Concerns: At Risk for Falls    Impairments/Disabilities:      Vision    Nutrition Therapy:  Current Nutrition Therapy:   - Oral Diet:  Carb Control 4 carbs/meal (1800kcals/day)    Routes of Feeding: Oral  Liquids: Thin Liquids  Daily Fluid Restriction: no  Last Modified Barium Swallow with Video (Video Swallowing Test): not done    Treatments at the Time of Hospital Discharge:   Respiratory Treatments:   Oxygen Therapy:  is not on home oxygen therapy.   Ventilator:    - No ventilator support    Rehab Therapies: Physical Therapy and Occupational Therapy  Weight Bearing Status/Restrictions: No weight bearing restirctions  Other Medical Equipment (for information only, NOT a DME order):  hospital bed  Other Treatments:     Patient's personal belongings (please select all that are sent with patient):  Glasses    RN SIGNATURE:  Electronically signed by Marcelina Cheng RN on 8/19/20 at 4:19 PM EDT    CASE MANAGEMENT/SOCIAL WORK SECTION    Inpatient Status Date: ***    Readmission Risk Assessment Score:  Readmission Risk              Risk of Unplanned Readmission:        17           Discharging to Facility/ Agency   · Name: [facility]  · Address:  · Phone:[phone]  · Fax:    Dialysis Facility (if applicable)   · Name:  · Address:  · Dialysis Schedule:  · Phone:  · Fax:    Case Manager/ signature: . Electronically signed by PAIGE Kinney on 8/13/2020 at 9:32 AM      PHYSICIAN SECTION    Prognosis: {Prognosis:[id number]}    Condition at Discharge: [address] Patient Condition:[id number]}    Rehab Potential (if transferring to Rehab): {Prognosis:4430256084}    Recommended Labs or Other Treatments After Discharge: ***    Physician Certification: I certify the above information and transfer of Gama Perkins.  is necessary for the continuing treatment of the diagnosis listed and that he requires Intermediate Nursing Care for greater 30 days.      Update Admission H&P: {CHP DME Changes in QZUKP:[id number]}    PHYSICIAN SIGNATURE:  Electronically signed by Bobby Pascual MD on 8/19/20 at 2:10 PM EDT

## 2020-08-14 NOTE — PROGRESS NOTES
Occupational Therapy  OCCUPATIONAL THERAPY INITIAL EVALUATION      Date:2020  Patient Name: Shawnee Stallings. MRN: [id number]  : 1950  Room: [address] [place] [address]      Evaluating OT: Juliette Cedillo OTR/L   Referring provider: Sid Tellez MD    AM-PAC Daily Activity Raw Score:   Recommended Adaptive Equipment: none at this time     Diagnosis: Brain Mass  PMH: DM, HTN, chronic back pain, PVD, depression, DKA, CVA, OA, anemia, CKD, osteomyelitis      Additional information: pt admitted with stroke like symptoms     Precautions:  Falls, R mary, mild aphasia, suprapubic cath, bed alarm      Home Living: [facility] setup: pt sponge bathes       Prior Level of Function: Assist with ADLs pt reports Mod I with grooming/hygiene, assist with IADLs; using w/c for ambulation. Driving: no  Occupation: retired W[employer]    Pain Level: No c/o pain this session   Cognition: A&O: 3; Follows multi step directions   Memory: G   Sequencing: F   Problem solving: F   Judgement/safety: F     Functional Assessment:   Initial Eval Status  Date:  Treatment Status  Date: Short Term Goals  Treatment frequency: 1-3x/week on PRN    Feeding NPO for testing NT this date      Grooming/Hygiene Mod A overall    Min A   while seated     UB Dressing Mod A   Min A  Seated with G amry dressing techs     LB Dressing DEP   Mod A   with AE PRN   Bathing Max A     Toileting DEP   Mod A  Including all clothing mgmt    Bed Mobility  Supine to sit: Mod A  Sit to supine: Mod A  Mod cues throughout for hand placement and sequencing      Functional Transfers Sit to stand: Max A  Stand to sit:  Max A  *Partial stand   Mod Ax1-2  From varying surfaces with G safety    Functional Mobility NT   Mod A x1-2    with AD short distances at bedside    Balance Sitting:      Static: SBA    Dynamic:SBA  Standing: NT     Endurance/Activity Tolerance F-   G    during 15-20 min ADL task    Visual/  Perceptual Glasses:  Yes; wears at all times; [facility] and scanning, acuity         UE strengthening    See UE Assessment Below  G tolerance  For RUE AROM/AAROM in all planes to improve overall function for ADL tasks      UE Assessment  Hand dominance: R     Strength ROM  Comments   RUE  Proximal: 1+ to2 /5  Distal: 2-/5 elbow, 1/5 wrist and digits  Proximal:minimal AROM; PROM WFL  Distal: WFL Non functional  and P- FMC/dexterity noted during ADL tasks; very minimal mvmt at digits    LUE Proximal: 4-/5  Distal: 4-/5 Proximal: WFL  Distal: WFL F  and F FMC/dexterity noted during ADL tasks    Arthritic deformities      Hearing: WFL  Sensation: impaired/diminished sensation RUE   Tone: R hank  Edema: none noted                               Comments:  Upon arrival pt supine in bed,family present but left shortly after arrival.  After Bed mobility, dressing task attempt, and functional transfer, pt fatigued and requesting to return to supine position in bed, not agreeable to EOB ADLs d/t fatigue and weakness d/t being NPO for days. Pt then left in chair position with all devices within reach, all lines and tubes intact    Overall, patient demonstrates Severe difficulties with completion of BADLs and IADLs. Factors contributing to these difficulties include Hank weakness, impaired sensation, decreased endurance,  and generalized weakness. Based on patient's functional performance as stated above and level of assistance needed prior to admission, this therapist believes that the patient would benefit from Continued therapy for ADL retraining, strengthening, building endurance/activity tolerance and overall functioning in order to safely return home. Treatment:     ? Bed mobility: Facilitated bed mobility with cues for proper body mechanics and sequencing to prepare for ADL completion at edge of bed  ?  ADL completion: Self-care retraining for the above-mentioned ADLs; training on proper hand placement, safety technique, sequencing, and energy conservation techniques during ADLs and while completing functional transfers. ? Skilled positioning: Pt properly positioned using pillows and bed mechanics to improve interaction with environment, overall functioning and decrease/prevent edema and contractures. ? Education: OT POC, OT role, Importance of completing ADL tasks daily as IND as possible to aide in recovery process, fall risk precautions, proper positioning of RUE, and SROM/AROM exercises. Eval Complexity:   mod Complexity  · History: Expanded review of medical records and additional review of physical, cognitive, or psychosocial history related to current functional performance  · Exam: 3+ performance deficits  · Assistance/Modification: Min/mod assistance or modifications required to perform tasks. May have comorbidities that affect occupational performance. Assessment of current deficits:   Functional mobility [x]  ADLs [x] Strength [x]  Cognition []  Functional transfers  [x] IADLs [x] Safety Awareness []  Endurance [x]  Fine Motor Coordination [x] Balance [x] Vision/perception [] Sensation [x]   Gross Motor Coordination [x] ROM [x] Delirium []                  Motor Control []    Plan of Care:  ADL retraining [x]   Equipment needs [x]   Neuromuscular re-education [] Energy Conservation Techniques [x]  Functional Transfer training [x] Patient and/or Family Education [x]  Functional Mobility training [x]  Environmental Modifications [x]  Cognitive re-training []   Compensatory techniques for ADLs [x]  Splinting Needs []   Positioning to improve overall function [x]   Therapeutic Activity [x]                       Therapeutic Exercise  [x]  Visual/Perceptual: []    Delirium prevention/treatment  [x]   Other:  []    Rehab Potential: Good for established goals    Patient / Family Goal: to get better ASAP    Patient and/or family were instructed/educated on diagnosis, prognosis/goals and plan of care.  Demonstrated F understanding, further information needed. [] Malnutrition indicators have been identified and nursing has been notified to ensure a dietitian consult is ordered. Moderate Evaluation + 25m tx  Time In:1420           Time Out: 1450                Treatment Charges: Mins Units   Ther Ex  57701       Manual Therapy 32634       Thera Activities 83177 15    ADL/Home Mgt 54550  10     Neuro Re-ed 43790       Orthotic manage/training  62125       Non-Billable Time       Total Timed Treatment 25 2      Evaluation time includes thorough review of current medical information, gathering information on past medical history/social history and prior level of function, completion of standardized testing/informal observation of tasks, assessment of data and development of POC/Goals.      Bonni Bosworth, OTR/L   [id number]

## 2020-08-14 NOTE — CARE COORDINATION
SOCIAL WORK/DISCHARGE PLANNING; Continue to follow for return to [address] ICF level when medically ready. Envelope is in chart. Pt's work/up ongoing.  Pt ordered CT abdomen and CT chest.  Jonn Betancourt [facility]  [phone]

## 2020-08-14 NOTE — PROGRESS NOTES
Resting comfortable in bed easily aroused from sleep. Saline lock, suprapubic cath emptied yellow urine, weakness rt arm and left leg very weak. At first seem a little forgetful then cognitively cleared within seconds R/A B/S 97.

## 2020-08-14 NOTE — CONSULTS
[address]                  [address] [facility],  [address]                                  CONSULTATION    PATIENT NAME: Mita Arceo                       :        1950  MED REC NO:   [id number]                            ROOM:       6410  ACCOUNT NO:   [de-identified]                           ADMIT DATE: 2020  PROVIDER:     Reba Anders MD    CONSULT DATE:  2020    REASON FOR CONSULTATION:  Brain mass. HISTORY OF PRESENT ILLNESS:  This is a 78-year-old male who presented to  the emergency department with right upper extremity weakness and  aphasia, had started days ago. CT showed chronic left MCA infarction,  possible brain mass. MRI showed four lesions in the left hemisphere  consistent with metastases. I counseled him greater than 50% of the  encounter time. I answered all of his questions. I read the admitting  history and physical.    PHYSICAL EXAMINATION:  GENERAL:  He is awake, alert, and oriented, friendly and cooperative. EXTREMITIES:  He does have right upper extremity weakness, 3 to 4+ out  of 5, but otherwise was grossly intact. HEENT:  Pupils equal, round and reactive. Extraocular movements are  full. DIAGNOSIS:  Multiple brain metastases. RECOMMENDATIONS:  Recommend metastatic workup. CT scan of chest,  abdomen and pelvis. Possible bone scan and further workup if indicated. I doubt he will need neurosurgical intervention. Clearly needs Keppra.         Marissa Cavazos MD    D: 2020 11:16:28       T: 2020 12:09:14     PENELOPE/WOOD_RYAN  Job#: [id number]     Doc#: [id number]    CC:

## 2020-08-14 NOTE — PLAN OF CARE
Problem: Skin Integrity:  Goal: Will show no infection signs and symptoms  Description: Will show no infection signs and symptoms  8/14/2020 0036 by Fred Gimenez RN  Outcome: Ongoing  8/13/2020 1848 by Jed Serna RN  Outcome: Met This Shift  Goal: Absence of new skin breakdown  Description: Absence of new skin breakdown  8/14/2020 0036 by Fred Gimenez RN  Outcome: Ongoing  8/13/2020 1848 by Jed Serna RN  Outcome: Met This Shift     Problem: Falls - Risk of:  Goal: Will remain free from falls  Description: Will remain free from falls  8/14/2020 0036 by Fred Gimenez RN  Outcome: Ongoing  8/13/2020 1848 by Jed Serna RN  Outcome: Met This Shift  Goal: Absence of physical injury  Description: Absence of physical injury  8/14/2020 0036 by Fred Gimenez RN  Outcome: Ongoing  8/13/2020 1848 by Jed Serna RN  Outcome: Met This Shift

## 2020-08-14 NOTE — PROGRESS NOTES
Hospitalist Progress Note      SYNOPSIS: Patient admitted on 2020  Thepatient is a 72 y.o. male who presented to the emergency department with a history of expressive aphasia on the right upper extremity weakness that began days ago. He also complained of right arm weakness 3 to 4 days. Patient's CT shows chronic left MCA infarctions. MRI is pending. Patient will be admitted to hospitalist service with neurosurgery in consultation for evaluation and treatment.       SUBJECTIVE:    Patient seen and examined  Records reviewed. Stable overnight. No other overnight issues reported. Temp (24hrs), Av.1 °F (36.7 °C), Min:97.7 °F (36.5 °C), Max:98.6 °F (37 °C)    DIET: Diet NPO Effective Now Exceptions are: Sips with Meds  CODE: Prior    Intake/Output Summary (Last 24 hours) at 2020 0928  Last data filed at 2020 2687  Gross per 24 hour   Intake 0 ml   Output 1500 ml   Net -1500 ml       OBJECTIVE:    /67   Pulse 63   Temp 98.2 °F (36.8 °C) (Oral)   Resp 14   Ht 6' (1.829 m)   Wt 154 lb 4.8 oz (70 kg)   SpO2 98%   BMI 20.93 kg/m²     General appearance: No apparent distress, appears stated age and cooperative. HEENT:  Conjunctivae/corneas clear. Neck: Supple. No jugular venous distention. Respiratory: Clear to auscultation bilaterally, normal respiratory effort  Cardiovascular: Regular rate rhythm, normal S1-S2  Abdomen: Soft, nontender, nondistended  Musculoskeletal: No clubbing, cyanosis, no bilateral lower extremity edema. Brisk capillary refill. Skin:  No rashes  on visible skin  Neurologic: awake, alert and following commands     ASSESSMENT:  1. Chronic left MCA infarction with right hemiparesis  2. Metastatic lesions in the left cerebral hemisphere  3. History of COPD  4. Insulin-dependent diabetes mellitus  5. Hypertension  6. Hyperlipidemia     PLAN:  1. Neurosurgery following  2. Sliding scale insulin  3. Keppra 500 mg twice daily  4.   Continue amlodipine, atenolol  5. Continue atorvastatin and clonidine  6. CT chest, abdomen/pelvis      Medications:  REVIEWED DAILY    Infusion Medications    dextrose       Scheduled Medications    amLODIPine  10 mg Oral Nightly    atenolol  12.5 mg Oral Daily    atorvastatin  80 mg Oral Nightly    calcitRIOL  0.25 mcg Oral Every Other Day    cloNIDine  0.1 mg Oral TID    famotidine  20 mg Oral Nightly    ferrous sulfate  324 mg Oral Daily with breakfast    fluticasone  1 spray Nasal BID    insulin glargine  20 Units Subcutaneous Nightly    magnesium oxide  400 mg Oral Daily    levETIRAcetam  500 mg Oral BID    insulin lispro  0-10 Units Subcutaneous 4x Daily WC     PRN Meds: glucose, dextrose, glucagon (rDNA), dextrose    Labs:     Recent Labs     08/12/20  2250 08/14/20  0529   WBC 5.1 4.4*   HGB 10.5* 10.4*   HCT 32.9* 32.8*    429       Recent Labs     08/12/20  2250 08/13/20  0739 08/14/20  0529    138 137   K 4.9 5.0 5.4*    103 103   CO2 21* 21* 21*   BUN 37* 32* 33*   CREATININE 1.9* 1.8* 1.7*   CALCIUM 9.5 9.3 9.5       Recent Labs     08/12/20  2250   PROT 7.2   ALKPHOS 107   ALT 14   AST 20   BILITOT 0.3       Recent Labs     08/13/20  0739   INR 1.2       Recent Labs     08/12/20  2250   TROPONINI 0.03       Chronic labs:    Lab Results   Component Value Date    CHOL 104 05/28/2016    TRIG 113 05/28/2016    HDL 49 05/28/2016    LDLCALC 32 05/28/2016    TSH 2.950 08/21/2018    PSA 0.51 06/30/2016    INR 1.2 08/13/2020    LABA1C 5.6 03/08/2018       Radiology: REVIEWED DAILY    +++++++++++++++++++++++++++++++++++++++++++++++++  Kelle Palafox Physician - 2020 [facility], [place]  +++++++++++++++++++++++++++++++++++++++++++++++++  NOTE: This report was transcribed using voice recognition software. Every effort was made to ensure accuracy; however, inadvertent computerized transcription errors may be present.

## 2020-08-14 NOTE — PROGRESS NOTES
Called Ct lab and spoke with  and she stated that ct lab was backed up with trauma patient and I could feed pt because he would not be done until probably tomorrow, informed pt and gave him water and crackers

## 2020-08-14 NOTE — PROGRESS NOTES
Physical Therapy  Physical Therapy Initial Assessment     Name: Orlando Jose. : 1950  MRN: [id number]    Referring Provider: Kayli Lacey MD    Date of Service: 2020    Evaluating PT: Sushil Buenrostro, PT, DPT, [id number]    Room #: 2913/1839-D  Diagnosis: Brain mass  PMHx/PSHx: DM, HTN, chronic back pain, PVD, depression, DKA, CVA, OA, anemia, CKD, osteomyelitis  Precautions: Fall risk, expressive aphasia, R hemiparesis, suprapubic catheter, alarm    SUBJECTIVE:    Pt is questionable historian. Pt reports he is from an ECF. Pt used WC as primary means of mobility prior to admission. Pt performs stand pivot transfers to/from [facility] with \"a lot of assistance\". Pt was able to self propel WC. Pt was unable to state how long it has been since he last ambulated. OBJECTIVE:   Initial Evaluation  Date: 20 Treatment Date: Short Term/ Long Term   Goals   AM-PAC 6 Clicks      Was pt agreeable to Eval/treatment? Yes     Does pt have pain? No current complaints of pain     Bed Mobility  Rolling: SBA  Supine to sit: Mod A  Sit to supine: Max A  Scooting: Mod A to EOB  Rolling: Supervision  Supine to sit: Supervision  Sit to supine: Supervision  Scooting: Supervision   Transfers Sit to stand: Max A (partial stand)  Stand to sit: Max A  Stand pivot: NT  Sit to stand: Min A  Stand to sit: Min A  Stand pivot: Max A with AAD   Ambulation   NT  Sidestep 3 feet with AAD with Max A   Stair negotiation: ascended and descended NA  NA   ROM BUE: Refer to OT note  BLE: WFL     Strength BUE: Refer to OT note  BLE: L 4/5, R 3/5     Balance Sitting EOB: SBA  Dynamic Standing: NT  Sitting EOB: Supervision  Dynamic Standing: Max A with AAD     Pt is A & O x: 3 to person, place, and month/year. Pt is confused to situation and had difficulty recalling PLOF. Sensation: Pt reports R UE numbness and tingling of recent onset. Edema: Unremarkable. Patient education  Pt educated on PT role in acute care setting.     Patient function, completion of standardized testing/informal observation of tasks, assessment of data and education on plan of care and goals.     CPT codes:  [] Low Complexity PT evaluation 68673  [x] Moderate Complexity PT evaluation 85193  [] High Complexity PT evaluation 40828  [] PT Re-evaluation 35894  [] Gait training 40778 0 minutes  [] Manual therapy 45123 0 minutes  [x] Therapeutic activities 03449 15 minutes  [] Therapeutic exercises 43993 0 minutes  [] Neuromuscular reeducation 00905 0 minutes     Salvatore Senior, PT, DPT  [id number]

## 2020-08-14 NOTE — PROGRESS NOTES
Dr Gely Gaming called and orders clarified regarding NPO, pt to remain NPO except meds until am. Notified that , lantus to be held tonight. Pt aware and orders entered.  Electronically signed by Susan Govea RN on 8/13/2020 at 8:21 PM

## 2020-08-15 NOTE — PROGRESS NOTES
Neurosurgery  Reviewed MRI results and chest ct results with patient  Will consult onc and pulm  Will follow

## 2020-08-15 NOTE — PLAN OF CARE
Problem: Skin Integrity:  Goal: Will show no infection signs and symptoms  Description: Will show no infection signs and symptoms  8/15/2020 0642 by Mary Diaz RN  Outcome: Met This Shift  8/14/2020 1958 by Anurag Manuel RN  Outcome: Met This Shift  Goal: Absence of new skin breakdown  Description: Absence of new skin breakdown  8/15/2020 0642 by Mary Diaz RN  Outcome: Met This Shift  8/14/2020 1958 by Anurag Manuel RN  Outcome: Met This Shift     Problem: Falls - Risk of:  Goal: Will remain free from falls  Description: Will remain free from falls  8/15/2020 0642 by Mary Diaz RN  Outcome: Met This Shift  8/14/2020 1958 by Anurag Manuel RN  Outcome: Met This Shift  Goal: Absence of physical injury  Description: Absence of physical injury  8/15/2020 0642 by Mary Diaz RN  Outcome: Met This Shift  8/14/2020 1958 by Anurag Manuel RN  Outcome: Met This Shift

## 2020-08-15 NOTE — PROGRESS NOTES
. Dr. Roche Prokatarzyna answering service notified of new consult. Dr. Matt Gunter on call for the group.    Brittanie Baez

## 2020-08-15 NOTE — PLAN OF CARE
Problem: Skin Integrity:  Goal: Will show no infection signs and symptoms  Description: Will show no infection signs and symptoms  8/15/2020 1509 by Helena Granados RN  Outcome: Met This Shift  8/15/2020 0642 by Tello Esposito RN  Outcome: Met This Shift  Goal: Absence of new skin breakdown  Description: Absence of new skin breakdown  8/15/2020 1509 by Helena Granados RN  Outcome: Met This Shift  8/15/2020 0642 by Tello Esposito RN  Outcome: Met This Shift     Problem: Falls - Risk of:  Goal: Will remain free from falls  Description: Will remain free from falls  8/15/2020 1509 by Helena Granados RN  Outcome: Met This Shift  8/15/2020 0642 by Tello Esposito RN  Outcome: Met This Shift  Goal: Absence of physical injury  Description: Absence of physical injury  8/15/2020 1509 by Helena Granados RN  Outcome: Met This Shift  8/15/2020 0642 by Tello Esposito RN  Outcome: Met This Shift

## 2020-08-15 NOTE — PROGRESS NOTES
Hospitalist Progress Note      SYNOPSIS: Patient admitted on 2020  Thepatient is a 72 y.o. male who presented to the emergency department with a history of expressive aphasia on the right upper extremity weakness that began days ago. He also complained of right arm weakness 3 to 4 days. Patient's CT shows chronic left MCA infarctions. MRI is pending. Patient will be admitted to hospitalist service with neurosurgery in consultation for evaluation and treatment.       SUBJECTIVE:    Patient seen and examined  Records reviewed. Soft tissue mass from left hilum and exerting mass-effect on left upper lobe bronchus noted on CT  Stable overnight. No other overnight issues reported. Temp (24hrs), Av.5 °F (36.4 °C), Min:97 °F (36.1 °C), Max:97.9 °F (36.6 °C)    DIET: DIET CARB CONTROL; Carb Control: 4 carbs/meal (approximate 1800 kcals/day); No Added Salt (3-4 GM)  CODE: Prior    Intake/Output Summary (Last 24 hours) at 8/15/2020 0937  Last data filed at 8/15/2020 0458  Gross per 24 hour   Intake 30 ml   Output 1150 ml   Net -1120 ml       OBJECTIVE:    /67   Pulse 68   Temp 97.5 °F (36.4 °C) (Temporal)   Resp 16   Ht 6' (1.829 m)   Wt 154 lb 4.8 oz (70 kg)   SpO2 98%   BMI 20.93 kg/m²     General appearance: No apparent distress, appears stated age and cooperative. HEENT:  Conjunctivae/corneas clear. Neck: Supple. No jugular venous distention. Respiratory: Clear to auscultation bilaterally, normal respiratory effort  Cardiovascular: Regular rate rhythm, normal S1-S2  Abdomen: Soft, nontender, nondistended  Musculoskeletal: No clubbing, cyanosis, no bilateral lower extremity edema. Brisk capillary refill. Skin:  No rashes  on visible skin  Neurologic: awake, alert and following commands     ASSESSMENT:  1.  Lung mass noted on CT  2. Metastatic lesions in the left cerebral hemisphere  3. History of COPD  4. Insulin-dependent diabetes mellitus  5. Hypertension  6.   Hyperlipidemia PLAN:  1. Neurosurgery following  2. Pulmonology consulted  3. Keppra 500 mg twice daily  4. Continue amlodipine, atenolol  5. Continue atorvastatin and clonidine      Medications:  REVIEWED DAILY    Infusion Medications    dextrose       Scheduled Medications    amLODIPine  10 mg Oral Nightly    atenolol  12.5 mg Oral Daily    atorvastatin  80 mg Oral Nightly    calcitRIOL  0.25 mcg Oral Every Other Day    cloNIDine  0.1 mg Oral TID    famotidine  20 mg Oral Nightly    ferrous sulfate  324 mg Oral Daily with breakfast    fluticasone  1 spray Nasal BID    insulin glargine  20 Units Subcutaneous Nightly    magnesium oxide  400 mg Oral Daily    levETIRAcetam  500 mg Oral BID    insulin lispro  0-10 Units Subcutaneous 4x Daily WC     PRN Meds: glucose, dextrose, glucagon (rDNA), dextrose    Labs:     Recent Labs     08/12/20  2250 08/14/20  0529 08/15/20  0614   WBC 5.1 4.4* 4.1*   HGB 10.5* 10.4* 10.8*   HCT 32.9* 32.8* 34.1*    429 502*       Recent Labs     08/12/20  2250 08/13/20  0739 08/14/20  0529    138 137   K 4.9 5.0 5.4*    103 103   CO2 21* 21* 21*   BUN 37* 32* 33*   CREATININE 1.9* 1.8* 1.7*   CALCIUM 9.5 9.3 9.5       Recent Labs     08/12/20  2250   PROT 7.2   ALKPHOS 107   ALT 14   AST 20   BILITOT 0.3       Recent Labs     08/13/20  0739   INR 1.2       Recent Labs     08/12/20  2250   TROPONINI 0.03       Chronic labs:    Lab Results   Component Value Date    CHOL 104 05/28/2016    TRIG 113 05/28/2016    HDL 49 05/28/2016    LDLCALC 32 05/28/2016    TSH 2.950 08/21/2018    PSA 0.51 06/30/2016    INR 1.2 08/13/2020    LABA1C 5.6 03/08/2018       Radiology: REVIEWED DAILY    +++++++++++++++++++++++++++++++++++++++++++++++++  Marcie Palafox Physician - [address], [place]  +++++++++++++++++++++++++++++++++++++++++++++++++  NOTE: This report was transcribed using voice recognition software.  Every effort was made to ensure accuracy; however, inadvertent computerized transcription errors may be present.

## 2020-08-15 NOTE — CONSULTS
[facility]  Department of Internal Medicine  Division of Pulmonary, Critical Care and Sleep Medicine  Consult Note    Ivette Pride DO, Cindy Loera MD, [facility]    Patient: Alexia Enriquez. MRN: [id number]  : 1950    Encounter Time: 12:37 PM     Date of Admission: 2020 10:25 PM    Primary Care Physician: No primary care provider on file. Reason for Consultation: Lung mass     HISTORY OF PRESENT ILLNESS : Alexia Enriquez. 72 y.o. male was seen in consultation regarding the above chief compliant. Rosa Guzman. is a 72 y.o. male presenting to the ED for hx expressive aphasia with right upper extremity weakness, beginning days ago. The complaint has been persistent, moderate in severity, and worsened by nothing. Patient reporting his right arm was weak and it started about 3 or 4 days ago. Patient reporting he had expressive aphasia as well today. Patient reporting no history of stroke. He reports no chest pain or difficulty breathing there is no abdominal pain or vomiting there is no diarrhea. There is no headache. CT showed chronic left MCA infarction, possible brain mass. MRI showed four lesions in the left hemisphere consistent with metastases.   His CT scan chest showed bilateral lung mass.      PAST MEDICAL HISTORY:  has a past medical history of Anemia, Arthritis, Atherosclerosis of native arteries of extremity with rest pain ([address] Back pain, chronic, Cerebral artery occlusion with cerebral infarction ([address] CKD (chronic kidney disease) stage 3, GFR 30-59 ml/min ([facility]), Cognitive communication deficit, Depression, Diabetes mellitus ([address] Diabetic ketoacidosis without coma associated with type 2 diabetes mellitus ([address] Diabetic ulcer of right foot associated with type 2 diabetes mellitus ([address] Difficulty walking, Gastrointestinal hemorrhage, Hypertension, Neuromuscular dysfunction of bladder, Onychomycosis, Osteomyelitis of toe of left foot ([facility] [address] Osteomyelitis of toe of right foot ([address] Other symbolic dysfunctions (CODE), Pulmonary nodule, PVD (peripheral vascular disease) with claudication ([address] Right sided weakness, Stroke ([address] and Tobacco dependence. SURGICAL HISTORY:  has a past surgical history that includes back surgery; ECHO Compl W Dop Color Flow (5/18/2012); other surgical history (11/28/15); Cystoscopy (4/8/16); other surgical history (4/8/16); Upper gastrointestinal endoscopy (03/09/2018); pr colonoscopy flx dx w/collj spec when pfrmd (N/A, 3/12/2018); pr office/outpt visit,procedure only (Bilateral, 3/19/2018); Colonoscopy; and Endoscopy, colon, diagnostic. SOCIAL HISTORY:  reports that he has been smoking cigarettes. He has a 5.00 pack-year smoking history. He has never used smokeless tobacco. He reports that he does not drink alcohol or use drugs. FAMILY  HISTORY: family history includes Heart Disease in his mother. MEDICATIONS:    Prior to Admission medications    Medication Sig Start Date End Date Taking?  Authorizing Provider   loratadine (CLARITIN) 10 MG tablet Take 10 mg by mouth 2 times daily as needed (itching)   Yes Historical Provider, MD   donepezil (ARICEPT) 5 MG tablet Take 5 mg by mouth nightly   Yes Historical Provider, MD   hydrALAZINE (APRESOLINE) 50 MG tablet Take 50 mg by mouth 4 times daily   Yes Historical Provider, MD   insulin glargine (LANTUS) 100 UNIT/ML injection vial Inject 13 Units into the skin nightly   Yes Historical Provider, MD   escitalopram (LEXAPRO) 5 MG tablet Take 5 mg by mouth daily   Yes Historical Provider, MD   guaiFENesin (ROBITUSSIN) 100 MG/5ML SOLN oral solution Take 200 mg by mouth every 6 hours as needed for Cough   Yes Historical Provider, MD   sennosides-docusate sodium (SENOKOT-S) 8.6-50 MG tablet Take 2 tablets by mouth 2 times daily as needed for Constipation   Yes Historical Provider, MD   ammonium lactate (LAC-HYDRIN) 12 % lotion Apply topically as needed for Dry Skin Apply topically as needed. Historical Provider, MD   clopidogrel (PLAVIX) 75 MG tablet Take 1 tablet by mouth daily 8/1/18   Thomas Plasencia MD   amoxicillin-clavulanate (AUGMENTIN) 500-125 MG per tablet Take 1 tablet by mouth 2 times daily 5/31/18   Historical Provider, MD   doxycycline monohydrate (MONODOX) 100 MG capsule Take 100 mg by mouth 2 times daily    Historical Provider, MD   insulin lispro (HUMALOG) 100 UNIT/ML injection vial Inject 2-10 Units into the skin 4 times daily (before meals and nightly) 3/21/18   Kinjal Quinones, APN   cloNIDine (CATAPRES) 0.1 MG tablet Take 1 tablet by mouth 3 times daily 3/21/18   Kinjal Pesa, APN   atenolol (TENORMIN) 25 MG tablet Take 0.5 tablets by mouth daily 3/22/18   Kinjalese Pescarter, APN   amLODIPine (NORVASC) 10 MG tablet Take 1 tablet by mouth nightly 3/21/18   Kinjal Justoa, APN   docusate sodium (COLACE, DULCOLAX) 100 MG CAPS Take 100 mg by mouth daily 3/22/18   Kinjal Pesa, APN   calcitRIOL (ROCALTROL) 0.25 MCG capsule Take 1 capsule by mouth every other day M / W / F 3/21/18   Kinjal Quinones, APN   pantoprazole (PROTONIX) 40 MG tablet Take 1 tablet by mouth 2 times daily (before meals) 3/21/18   Kinjalese Pesa, APN   ferrous sulfate 324 (65 Fe) MG EC tablet Take 1 tablet by mouth daily (with breakfast) 3/21/18   Kinjalese Pesa, APN   fluticasone (FLONASE) 50 MCG/ACT nasal spray 1 spray by Nasal route 2 times daily    Historical Provider, MD   Multiple Vitamins-Minerals (THERAPEUTIC MULTIVITAMIN-MINERALS) tablet Take 1 tablet by mouth daily    Historical Provider, MD   magnesium oxide (MAG-OX) 400 (240 MG) MG tablet Take 1 tablet by mouth daily 4/11/16   Zoë Mtz DO   atorvastatin (LIPITOR) 80 MG tablet Take 1 tablet by mouth nightly 10/8/15   Alireza Garcia MD       ALLERGIES: Patient has no known allergies.        REVIEW OF SYSTEMS:  Otherwise negative if not reported or listed below  Constitutional:  No unanticipated weight loss. No change in sleep pattern or activity. No fevers, chills or rigors. Eyes:    No visual changes or diplopia. No scleral icterus. ENT:    No Headaches, hearing loss or vertigo. No mouth sores or sore throat. Cardiovascular:  + chest discomfort, palpitations. Respiratory:  + cough, No wheezing      No sputum production. No hemoptysis, pleuritic pain. Gastrointestinal:  No abdominal pain, appetite loss, blood in stools. No hematemesis  Genitourinary:  No dysuria, trouble voiding or hematuria. No nocturia. Musculoskeletal:   No weakness or joint complaints. Integumentary: No rashes or pruritis. Neurological:  No headache, numbness or tingling. Psychiatric:   No effect on mood, memory, mentation, or  behavior. No anxiety or depression. Endocrine:   No excessive thirst, fluid intake, or urination. No tremor. Hematologic: No abnormal bruising or bleeding. Lymphatic:  No swollen lymph nodes. Immunologic:  No hives or hx of anaphaxsis. OBJECTIVE:     PHYSICAL EXAM:   VITALS:     Intake/Output Summary (Last 24 hours) at 8/15/2020 1237  Last data filed at 8/15/2020 1028  Gross per 24 hour   Intake 30 ml   Output 1150 ml   Net -1120 ml        CONSTITUTIONAL:   A&O x 3, NAD  SKIN:     No skin discoloration  HEENT:     EOMI, MMM, No thrush  NECK:    No bruits, No JVP apprechiated  CV:      Sinus,  No murmur, No rubs, No gallops  PULMONARY:   Couse BS,  No Wheezing, No Rales, No Rhonchi      No noted egophony  ABDOMEN:     Soft, non-tender. BS normal. No R/R/G  EXT:    + deformities . No clubbing. No lower extremity edema, No venous stasis  PULSE:   Appears equal and palpable. PSYCHIATRIC:  Seems appropriate, No acute psycosis  MS:    No fractures, Gross weakness/new deficits  NEUROLOGIC:   Focal right deficits.      DATA: IMAGING & TESTING:     LABORATORY TESTS:    CBC:   Lab Results   Component FINDINGS: LIVER: Unremarkable. GALLBLADDER: Surgically absent. SPLEEN:Unremarkable. ADRENALS:Unremarkable. KIDNEYS:Unremarkable. PANCREAS: Multiple foci of calcification in the pancreas consistent with chronic pancreatitis. The calcifications are most prominent in the uncinate process. No gross mass lesion or ductal dilatation. BOWEL: Severe fecal retention is seen throughout the colon indicating constipation. No bowel wall thickening or obstruction is seen. APPENDIX:Unremarkable. BLADDER: Suprapubic catheter is seen decompressing the bladder. Apparent bladder wall thickening may be due to underdistention, neurogenic bladder cholecystitis. No surrounding edema is seen. REPRODUCTIVE ORGANS:Unremarkable. VASCULATURE: Severe calcified atherosclerosis is seen in the abdominal aorta and pelvic arteries. There is occlusion of the left internal iliac artery. Multiple high-grade stenoses seen in the right internal iliac artery. A high-grade stenosis is seen in the right external iliac artery and series 507, image 66. The stent is seen in the right common iliac artery. LYMPH NODES:Unremarkable. BONES: Severe loss of disc height at L4-5 and L5-S1. MISCELLANEOUS:No additional finding. 1. No evidence of metastatic disease. 2. Severe constipation. No evidence of mechanical obstruction. 3. Suprapubic catheter is seen decompressing the bladder. Apparent bladder wall thickening may be due to underdistention, neurogenic bladder or cystitis. 4. Severe atherosclerosis, with occlusion of the left internal iliac artery and high-grade stenoses in the right external and internal iliac arteries.     Ct Head Wo Contrast    Result Date: 2020  Patient MRN:  [id number] : 1950 Age: 72 years Gender: Male Order Date:  2020 11:23 PM EXAM: CT HEAD WO CONTRAST COMPARISON: January 15, 2017 INDICATION:  Evaluate intracranial abnormality Evaluate intracranial abnormality TECHNIQUE: Axial unenhanced CT scanning was performed through aortic arch calcification. Mediastinum: There is a 3.8 x 2.5 cm soft tissue mass which arises within the left hilum and exerts mass effect on the left upper lobe bronchus (series 10 image 34). Also noted is a 1.9 x 1.8 cm nodule within the right lower lobe adjacent to the azygos vein (series 10 image 47). There are several small nodular consolidations within the right lower lobe in a peribronchovascular distribution. Centrilobular emphysema. Scattered areas of parenchymal fibrosis. There is a rounded 1.4 cm precarinal lymph node (series 2 image 30). Pleura: Unremarkable. Osseous structures/soft tissue: Unremarkable. Upper abdomen: Please see same day CT of the abdomen for description of findings or the diaphragm. Other: None. 3.8 x 2.57 m soft tissue mass arising from the left hilum and exerting mass effect on the left upper lobe bronchus. Also noted is mediastinal lymphadenopathy, multiple solid nodules within the right lower lobe in a peribronchovascular distribution, and a 1.9 x 1.8 cm soft tissue nodule within the right lower lobe adjacent to the azygos vein. These findings are concerning for primary lung neoplasm with metastatic spread of disease. Mri Brain W Wo Contrast    Result Date: 2020  Patient MRN:  [id number] : 1950 Age: 72 years Gender: Male Order Date:  2020 2:31 PM EXAM: MRI BRAIN W WO CONTRAST INDICATION:  brain mass Verbal to change from Dr. Bria Machado to with and without brain mass  COMPARISON: None FINDINGS: PARENCHYMA: Insufflation from chronic left MCA infarction seen, involving the left frontal, parietal and temporal lobes, as well as the insula and subinsular region. There is a 2 cm enhancing mass in the high left posterior frontal lobe resulting in significant vasogenic edema in the frontoparietal white matter. Another 4.5 mm metastatic lesion is seen in the inferior left frontal lobe (series 10, image 17).  Another 2 mm lesion in the left frontal operculum (series

## 2020-08-15 NOTE — CONSULTS
Department of Medicine  Division of Hematology/Oncology  Attending Consult Note      Reason for Consult:  Lung cancer with brain metastasis  Requesting Physician:  Dr. Hayley Miranda: Right upper extremity weakness    History Obtained From:  Patient and EMR    HISTORY OF PRESENT ILLNESS:      The patient is a 72 y.o. male with significant past medical history of prior left MCA CVA with residual expressive dysphasia and R sided weakness. He recently developed right upper extremity weakness of 1 day duration prompting visit to the ER. CT head 8/12/2020 revealed chronic left MCA stroke with large area of encephalomalacia changes involving left insular cortex, left temporal lobe, left frontal lobe and anterior left parietal lobe. New edema with posterior left frontal lobe surrounding cortical mass measuring 1.6 x 1.6 cm, suggesting new intracranial metastasis. Underwent further imaging work-up with CT chest 8/14/20 that revealed 3.8 x 2.5 cm soft tissue mass arising within the left hilum and exerting mass-effect on OH bronchus. Also 1.9 x 1.8 cm nodule within RLL adjacent to the azygous vein. Several small nodular consolidations within the RLL also noted. Scattered parenchymal fibrosis. 1.4 cm precarinal lymph node. CT abdomen/pelvis 8/14/2020 revealed no evidence of metastatic disease. Severe atherosclerosis with occlusion of left internal iliac artery and high-grade stenosis in right external and internal iliac arteries. MRI brain 8/13/2020 reveals for metastatic lesions in the left cerebral hemisphere largest in the high left posterior frontal lobe measuring 2 cm. Vasogenic edema also noted. Another 4.5 mm metastatic lesion in inferior L frontal lobe, another 2 mm lesion in L frontal operculum. 4 mm lesion in L cingulate gyrus. Also 1.2 x 0.7 cm mass along right frontal convexity, may represent meningioma or a dural metastasis.   Prominent encephalomalacia in the left cerebral  ECHO COMPL W DOP COLOR FLOW  5/18/2012         ENDOSCOPY, COLON, DIAGNOSTIC      OTHER SURGICAL HISTORY  11/28/15    lap choley and cholangiogram    OTHER SURGICAL HISTORY  4/8/16    Suprapubic Catheter Insertion    MS COLONOSCOPY FLX DX W/COLLJ SPEC WHEN PFRMD N/A 3/12/2018    COLONOSCOPY performed by Brandt Hernandes MD at [address] [place] [address]/OUTPT VISIT,PROCEDURE ONLY Bilateral 3/19/2018    RESECTION OF BONE CURRETAGE LEFT 1ST DIGIT performed by Frances Mcbride DPM at [facility]  03/09/2018       Current Medications:    Current Facility-Administered Medications: polyethylene glycol (GLYCOLAX) packet 17 g, 17 g, Oral, Daily PRN  amLODIPine (NORVASC) tablet 10 mg, 10 mg, Oral, Nightly  atenolol (TENORMIN) tablet 12.5 mg, 12.5 mg, Oral, Daily  atorvastatin (LIPITOR) tablet 80 mg, 80 mg, Oral, Nightly  calcitRIOL (ROCALTROL) capsule 0.25 mcg, 0.25 mcg, Oral, Every Other Day  cloNIDine (CATAPRES) tablet 0.1 mg, 0.1 mg, Oral, TID  famotidine (PEPCID) tablet 20 mg, 20 mg, Oral, Nightly  ferrous sulfate (IRON 325) tablet 324 mg, 324 mg, Oral, Daily with breakfast  fluticasone (FLONASE) 50 MCG/ACT nasal spray 1 spray, 1 spray, Nasal, BID  insulin glargine (LANTUS) injection vial 20 Units, 20 Units, Subcutaneous, Nightly  magnesium oxide (MAG-OX) tablet 400 mg, 400 mg, Oral, Daily  levETIRAcetam (KEPPRA) tablet 500 mg, 500 mg, Oral, BID  insulin lispro (HUMALOG) injection vial 0-10 Units, 0-10 Units, Subcutaneous, 4x Daily WC  glucose (GLUTOSE) 40 % oral gel 15 g, 15 g, Oral, PRN  dextrose 50 % IV solution, 12.5 g, Intravenous, PRN  glucagon (rDNA) injection 1 mg, 1 mg, Intramuscular, PRN  dextrose 5 % solution, 100 mL/hr, Intravenous, PRN    Allergies:  Patient has no known allergies.     Social History:   Social History     Socioeconomic History    Marital status: Single     Spouse name: Not on file    Number of children: Not on file    Years of education: Not on file    Highest education level: Not on file   Occupational History    Not on file   Social Needs    Financial resource strain: Not on file    Food insecurity     Worry: Not on file     Inability: Not on file    Transportation needs     Medical: Not on file     Non-medical: Not on file   Tobacco Use    Smoking status: Current Every Day Smoker     Packs/day: 0.10     Years: 50.00     Pack years: 5.00     Types: Cigarettes    Smokeless tobacco: Never Used   Substance and Sexual Activity    Alcohol use: No     Alcohol/week: 23.3 standard drinks     Types: 28 Standard drinks or equivalent per week    Drug use: No    Sexual activity: Not on file   Lifestyle    Physical activity     Days per week: Not on file     Minutes per session: Not on file    Stress: Not on file   Relationships    Social connections     Talks on phone: Not on file     Gets together: Not on file     Attends Holiness service: Not on file     Active member of club or organization: Not on file     Attends meetings of clubs or organizations: Not on file     Relationship status: Not on file    Intimate partner violence     Fear of current or ex partner: Not on file     Emotionally abused: Not on file     Physically abused: Not on file     Forced sexual activity: Not on file   Other Topics Concern    Not on file   Social History Narrative    Not on file       Family History:         Problem Relation Age of Onset    Heart Disease Mother        REVIEW OF SYSTEMS:    CONSTITUTIONAL:  +for fatigue.  Neg fevers, chills, sweats and weight loss  HEENT:  negative for hearing loss, epistaxis and sore throat  RESPIRATORY:  negative for cough with sputum, dyspnea, wheezing, hemoptysis and chest pain  CARDIOVASCULAR:  negative for chest pain, palpitations, PND, edema, syncope, dyspnea  GASTROINTESTINAL:  negative for nausea, vomiting, diarrhea, constipation, abdominal pain, jaundice, reflux, hematemesis and hemtochezia  GENITOURINARY:  negative for obstruction. 3. Suprapubic catheter is seen decompressing the bladder. Apparent bladder wall thickening may be due to underdistention, neurogenic bladder or cystitis. 4. Severe atherosclerosis, with occlusion of the left internal iliac artery and high-grade stenoses in the right external and internal iliac arteries. Ct Head Wo Contrast  Result Date: 2020  Patient MRN:  [id number] : 1950 Age: 72 years Gender: Male Order Date:  2020 11:23 PM EXAM: CT HEAD WO CONTRAST COMPARISON: January 15, 2017 INDICATION:  Evaluate intracranial abnormality Evaluate intracranial abnormality TECHNIQUE: Axial unenhanced CT scanning was performed through the head without the use of intravenous contrast. Low-dose CT acquisition technique included one of the following options; 1. Automated exposure control, 2. Adjustment of mA and/or kV according to the patient's size or 3. Use of iterative reconstruction. FINDINGS: There are confluent areas of hypoattenuation involving the left insular cortex and subinsular white matter including left basal ganglia. Areas of hypoattenuation are also present involving the left frontal lobe, left temporal lobe, and anterior left parietal lobe. New areas of hypoattenuation are seen involving posterior left frontal lobe involving the subcortical white matter with sparing of the cortex. There is a cortical mass involving left frontal lobe measuring approximately 1.6 x 1.6 cm. No acute intracranial hemorrhage. No abnormal extra-axial fluid collections. No hydrocephalus. 1. Chronic left MCA stroke with large areas of encephalomalacic change involving left insular cortex, left temporal lobe, left frontal lobe, and anterior left parietal lobe. 2. New edema is associated with posterior left frontal lobe surrounding a cortical mass measuring 1.6 x 1.6 cm. This finding might suggest new intracranial metastases. MRI with and without contrast could be helpful for further evaluation.  ALERT:  THIS IS AN ABNORMAL REPORT. Ct Chest W Wo Contrast  Result Date: 2020  Patient Name:  Steven Fritz Patient MRN:  [id number] Patient :  1950 Patient Age:  72 years Patient Gender:  Male Order Date:2020 9:16 PM EXAM:  CT CHEST W WO CONTRAST NUMBER OF IMAGES:  033 INDICATION:   rule out mets rule out mets COMPARISON: None. Technique: Multiple axial images were obtained from the apices of the lungs through the lung bases. Sagittal and coronal reconstructions performed for aid in interpretation of the study. FINDINGS: Lungs: There are multiple pulmonary nodules noted throughout the lungs. . Airway: Unremarkable. Heart/Vasculature: Moderate coronary artery after study calcification. There is a small aortic leaflet calcification. Extensive aortic arch calcification. Mediastinum: There is a 3.8 x 2.5 cm soft tissue mass which arises within the left hilum and exerts mass effect on the left upper lobe bronchus (series 10 image 34). Also noted is a 1.9 x 1.8 cm nodule within the right lower lobe adjacent to the azygos vein (series 10 image 47). There are several small nodular consolidations within the right lower lobe in a peribronchovascular distribution. Centrilobular emphysema. Scattered areas of parenchymal fibrosis. There is a rounded 1.4 cm precarinal lymph node (series 2 image 30). Pleura: Unremarkable. Osseous structures/soft tissue: Unremarkable. Upper abdomen: Please see same day CT of the abdomen for description of findings or the diaphragm. Other: None. 3.8 x 2.57 m soft tissue mass arising from the left hilum and exerting mass effect on the left upper lobe bronchus. Also noted is mediastinal lymphadenopathy, multiple solid nodules within the right lower lobe in a peribronchovascular distribution, and a 1.9 x 1.8 cm soft tissue nodule within the right lower lobe adjacent to the azygos vein. These findings are concerning for primary lung neoplasm with metastatic spread of disease.      [address] Contrast  Result Date: 2020  Patient MRN:  [id number] : 1950 Age: 72 years Gender: Male Order Date:  2020 2:31 PM EXAM: MRI BRAIN W WO CONTRAST INDICATION:  brain mass Verbal to change from Dr. Gris Buck to with and without brain mass  COMPARISON: None FINDINGS: PARENCHYMA: Insufflation from chronic left MCA infarction seen, involving the left frontal, parietal and temporal lobes, as well as the insula and subinsular region. There is a 2 cm enhancing mass in the high left posterior frontal lobe resulting in significant vasogenic edema in the frontoparietal white matter. Another 4.5 mm metastatic lesion is seen in the inferior left frontal lobe (series 10, image 17). Another 2 mm lesion in the left frontal operculum (series 10, image 20). A 4 mm lesion is seen in the left cingulate gyrus, posterior to the splenium of corpus callosum (series 10, image 17). A 1.2 x 0.7 cm extra-axial enhancing lesion along the right frontal convexity, which may represent a metastasis or meningioma. VENTRICLES:No hydrocephalus. No extra-axial fluid. CALVARIUM:Calvarium is intact, without fracture or focal lesion. SINUSES: Minimal mucosal thickening in the paranasal and mastoid sinuses. MISCELLANEOUS:No other abnormality identified. 1. Four metastatic lesions in the left cerebral hemisphere, the largest in the high left posterior frontal lobe measuring 2 cm. The dominant 2 cm lesion is associated with significant vasogenic edema. 2. 1.2 x 0.7 cm extra-axial mass along the right frontal convexity, which may represent a meningioma or a dural metastasis. 3. Prominent area of encephalomalacia in the left cerebral hemisphere consistent with chronic MCA infarction. IMPRESSION:   36-year-old gentleman with left hilar pulmonary mass with CNS metastasis, most consistent with lung carcinoma stage IV. Hx of CVA with residual weakness. RECOMMENDATIONS:  Need tissue to confirm diagnosis. Recommend Bronchoscopic evaluation. Pulmonary consult. Reviewed images from CT TAP 8/14/20 personally, reveals L hilar mass measuring 3.8 x 2.5 cm with mass-effect on OH bronchus. Also 1.9 x 1.8 cm nodule within RLL and several small nodules within the RLL. 1.4 cm precarinal lymph node. No evidence of metastatic disease in the abdomen/pelvis. Has evidence of CNS metastasis on MRI with multiple small lesions. This will be a difficult situation to treat systemically due to his significant comorbidities. I will for now get radiation oncology consulted to plan for WBRT. Consult Rad Onc    Thank you for the consultation and allowing me to participate in the care of your patient. We will follow along.      Electronically signed by Jenny Sutton MD on 8/15/2020 at 2:18 PM

## 2020-08-16 NOTE — PROGRESS NOTES
Continue amlodipine, atenolol  5. Continue atorvastatin and clonidine  6. Bronchoscopy on Tuesday      Medications:  REVIEWED DAILY    Infusion Medications    dextrose       Scheduled Medications    amLODIPine  10 mg Oral Nightly    atenolol  12.5 mg Oral Daily    atorvastatin  80 mg Oral Nightly    calcitRIOL  0.25 mcg Oral Every Other Day    cloNIDine  0.1 mg Oral TID    famotidine  20 mg Oral Nightly    ferrous sulfate  324 mg Oral Daily with breakfast    fluticasone  1 spray Nasal BID    insulin glargine  20 Units Subcutaneous Nightly    magnesium oxide  400 mg Oral Daily    levETIRAcetam  500 mg Oral BID    insulin lispro  0-10 Units Subcutaneous 4x Daily WC     PRN Meds: polyethylene glycol, glucose, dextrose, glucagon (rDNA), dextrose    Labs:     Recent Labs     08/14/20  0529 08/15/20  0614 08/16/20  0653   WBC 4.4* 4.1* 3.8*   HGB 10.4* 10.8* 10.2*   HCT 32.8* 34.1* 32.0*    502* 444       Recent Labs     08/14/20  0529      K 5.4*      CO2 21*   BUN 33*   CREATININE 1.7*   CALCIUM 9.5       No results for input(s): PROT, ALB, ALKPHOS, ALT, AST, BILITOT, AMYLASE, LIPASE in the last 72 hours. No results for input(s): INR in the last 72 hours. No results for input(s): Leellen Carota in the last 72 hours. Chronic labs:    Lab Results   Component Value Date    CHOL 104 05/28/2016    TRIG 113 05/28/2016    HDL 49 05/28/2016    LDLCALC 32 05/28/2016    TSH 2.950 08/21/2018    PSA 0.51 06/30/2016    INR 1.2 08/13/2020    LABA1C 5.6 03/08/2018       Radiology: REVIEWED DAILY    +++++++++++++++++++++++++++++++++++++++++++++++++  [address], [place]  +++++++++++++++++++++++++++++++++++++++++++++++++  NOTE: This report was transcribed using voice recognition software. Every effort was made to ensure accuracy; however, inadvertent computerized transcription errors may be present.

## 2020-08-17 NOTE — PLAN OF CARE
Problem: Skin Integrity:  Goal: Will show no infection signs and symptoms  Description: Will show no infection signs and symptoms  8/17/2020 0052 by Lynne Hendrix RN  Outcome: Met This Shift  8/16/2020 2103 by Rolland Councilman, RN  Outcome: Met This Shift  Goal: Absence of new skin breakdown  Description: Absence of new skin breakdown  Outcome: Met This Shift     Problem: Falls - Risk of:  Goal: Will remain free from falls  Description: Will remain free from falls  8/17/2020 0052 by Lynne Hendrix RN  Outcome: Met This Shift  8/16/2020 2103 by Rolland Councilman, RN  Outcome: Met This Shift  Goal: Absence of physical injury  Description: Absence of physical injury  Outcome: Met This Shift

## 2020-08-17 NOTE — PROGRESS NOTES
Hospitalist Progress Note      SYNOPSIS: Patient admitted on 2020  Thepatient is a 72 y.o. male who presented to the emergency department with a history of expressive aphasia on the right upper extremity weakness that began days ago. He also complained of right arm weakness 3 to 4 days. Patient's CT shows chronic left MCA infarctions. MRI is pending. Patient will be admitted to hospitalist service with neurosurgery in consultation for evaluation and treatment.       SUBJECTIVE:    Patient seen and examined  Records reviewed. Stable overnight. No other overnight issues reported. Temp (24hrs), Av.2 °F (36.8 °C), Min:97.9 °F (36.6 °C), Max:98.4 °F (36.9 °C)    DIET: DIET CARB CONTROL; Carb Control: 4 carbs/meal (approximate 1800 kcals/day); No Added Salt (3-4 GM)  CODE: Prior    Intake/Output Summary (Last 24 hours) at 2020 0952  Last data filed at 2020 0606  Gross per 24 hour   Intake 660 ml   Output 650 ml   Net 10 ml       OBJECTIVE:    /61   Pulse 54   Temp 98.3 °F (36.8 °C) (Oral)   Resp 16   Ht 6' (1.829 m)   Wt 154 lb 4.8 oz (70 kg)   SpO2 97%   BMI 20.93 kg/m²     General appearance: No apparent distress, appears stated age and cooperative. HEENT:  Conjunctivae/corneas clear. Neck: Supple. No jugular venous distention. Respiratory: Clear to auscultation bilaterally, normal respiratory effort  Cardiovascular: Regular rate rhythm, normal S1-S2  Abdomen: Soft, nontender, nondistended  Musculoskeletal: No clubbing, cyanosis, no bilateral lower extremity edema. Brisk capillary refill. Skin:  No rashes  on visible skin  Neurologic: awake, alert and following commands     ASSESSMENT:  1.  Lung mass noted on CT  2. Metastatic lesions in the left cerebral hemisphere  3. History of COPD  4. Insulin-dependent diabetes mellitus  5. Hypertension  6. Hyperlipidemia     PLAN:  1. Neurosurgery following  2. Pulmonology following  3. Keppra 500 mg twice daily  4. Continue amlodipine, atenolol  5. Continue atorvastatin and clonidine  6. Bronchoscopy tomorrow  7. Sliding scale insulin  8. Continue amlodipine, atenolol, atorvastatin, clonidine      Medications:  REVIEWED DAILY    Infusion Medications    dextrose       Scheduled Medications    amLODIPine  10 mg Oral Nightly    atenolol  12.5 mg Oral Daily    atorvastatin  80 mg Oral Nightly    calcitRIOL  0.25 mcg Oral Every Other Day    cloNIDine  0.1 mg Oral TID    famotidine  20 mg Oral Nightly    ferrous sulfate  324 mg Oral Daily with breakfast    fluticasone  1 spray Nasal BID    insulin glargine  20 Units Subcutaneous Nightly    magnesium oxide  400 mg Oral Daily    levETIRAcetam  500 mg Oral BID    insulin lispro  0-10 Units Subcutaneous 4x Daily WC     PRN Meds: mineral oil-hydrophilic petrolatum, polyethylene glycol, glucose, dextrose, glucagon (rDNA), dextrose    Labs:     Recent Labs     08/15/20  0614 08/16/20  0653 08/17/20  0615   WBC 4.1* 3.8* 3.2*   HGB 10.8* 10.2* 9.9*   HCT 34.1* 32.0* 30.9*   * 444 482*       No results for input(s): NA, K, CL, CO2, BUN, CREATININE, CALCIUM, PHOS in the last 72 hours. Invalid input(s): MAGNES    No results for input(s): PROT, ALB, ALKPHOS, ALT, AST, BILITOT, AMYLASE, LIPASE in the last 72 hours. No results for input(s): INR in the last 72 hours. No results for input(s): Valentino Bun in the last 72 hours.     Chronic labs:    Lab Results   Component Value Date    CHOL 104 05/28/2016    TRIG 113 05/28/2016    HDL 49 05/28/2016    LDLCALC 32 05/28/2016    TSH 2.950 08/21/2018    PSA 0.51 06/30/2016    INR 1.2 08/13/2020    LABA1C 5.6 03/08/2018       Radiology: REVIEWED DAILY    +++++++++++++++++++++++++++++++++++++++++++++++++  [address], [facility]  +++++++++++++++++++++++++++++++++++++++++++++++++  NOTE: This report was transcribed using voice recognition

## 2020-08-17 NOTE — CARE COORDINATION
SOCIAL WORK/DISCHARGE PLANNING;  Care coordination plan is to return to [place] when medically ready.

## 2020-08-17 NOTE — PROGRESS NOTES
Subjective: The patient is awake and alert in bed, has no complaints of pain, just very weak on his right side. No fever or chills. Denies CP or Sob. Patient did ask that I call his significant other Lilo Fitch to update her on plan of care. I did call and speak to her, answered all questions. No problems overnight. Denies chest pain, angina, dyspnea or abdominal discomfort. No nausea or vomiting. Tolerating diet. Objective:    /61   Pulse 54   Temp 98.3 °F (36.8 °C) (Oral)   Resp 16   Ht 6' (1.829 m)   Wt 154 lb 4.8 oz (70 kg)   SpO2 97%   BMI 20.93 kg/m²     General: NAD  HEENT: No thrush or mucositis, EOMI  Heart:  RRR, no murmurs, gallops, or rubs. Lungs:  CTA bilaterally, no wheeze, rales or rhonchi  Abd: bowel sounds present, nontender, nondistended, no masses  Extrem:  No clubbing, cyanosis, or edema  Lymphatics: No palpable adenopathy in cervical and supraclavicular regions  Skin: Intact, no petechia or purpura  Neuro: R hemiparesis.  LLE 3/5    CBC with Differential:    Lab Results   Component Value Date    WBC 3.2 08/17/2020    RBC 2.91 08/17/2020    HGB 9.9 08/17/2020    HCT 30.9 08/17/2020     08/17/2020    .2 08/17/2020    MCH 34.0 08/17/2020    MCHC 32.0 08/17/2020    RDW 14.9 08/17/2020    NRBC 0.0 03/18/2018    SEGSPCT 49 09/12/2013    LYMPHOPCT 53.5 08/17/2020    MONOPCT 4.4 08/17/2020    BASOPCT 0.6 08/17/2020    MONOSABS 0.13 08/17/2020    LYMPHSABS 1.73 08/17/2020    EOSABS 0.28 08/17/2020    BASOSABS 0.00 08/17/2020     CMP:    Lab Results   Component Value Date     08/14/2020    K 5.4 08/14/2020    K 5.1 08/21/2018     08/14/2020    CO2 21 08/14/2020    BUN 33 08/14/2020    CREATININE 1.7 08/14/2020    GFRAA 49 08/14/2020    LABGLOM 49 08/14/2020    GLUCOSE 80 08/14/2020    GLUCOSE 242 05/17/2012    PROT 7.2 08/12/2020    LABALBU 3.3 08/12/2020    LABALBU 4.2 05/17/2012    CALCIUM 9.5 08/14/2020    BILITOT 0.3 08/12/2020    ALKPHOS 107 08/12/2020 AST 20 08/12/2020    ALT 14 08/12/2020          Current Facility-Administered Medications:     mineral oil-hydrophilic petrolatum (HYDROPHOR) ointment, , Topical, BID PRN, Monika Plant, DO    polyethylene glycol (GLYCOLAX) packet 17 g, 17 g, Oral, Daily PRN, Monika Plant, DO    amLODIPine (NORVASC) tablet 10 mg, 10 mg, Oral, Nightly, Bridger Guaman MD, 10 mg at 08/16/20 2027    atenolol (TENORMIN) tablet 12.5 mg, 12.5 mg, Oral, Daily, Bridger Guaman MD, 12.5 mg at 08/17/20 0956    atorvastatin (LIPITOR) tablet 80 mg, 80 mg, Oral, Nightly, Bridger Guaman MD, 80 mg at 08/16/20 2027    calcitRIOL (ROCALTROL) capsule 0.25 mcg, 0.25 mcg, Oral, Every Other Day, Bridger Guaman MD, 0.25 mcg at 08/17/20 0955    cloNIDine (CATAPRES) tablet 0.1 mg, 0.1 mg, Oral, TID, Bridger Guaman MD, 0.1 mg at 08/17/20 0955    famotidine (PEPCID) tablet 20 mg, 20 mg, Oral, Nightly, Bridger Guaman MD, 20 mg at 08/16/20 2027    ferrous sulfate (IRON 325) tablet 324 mg, 324 mg, Oral, Daily with breakfast, Bridger Guaman MD, 324 mg at 08/17/20 0955    fluticasone (FLONASE) 50 MCG/ACT nasal spray 1 spray, 1 spray, Nasal, BID, Jaclyn Brewer MD, 1 spray at 08/15/20 2128    insulin glargine (LANTUS) injection vial 20 Units, 20 Units, Subcutaneous, Nightly, Bridger Guaman MD, 20 Units at 08/16/20 2028    magnesium oxide (MAG-OX) tablet 400 mg, 400 mg, Oral, Daily, Jaclyn Brewer MD, 400 mg at 08/17/20 0955    levETIRAcetam (KEPPRA) tablet 500 mg, 500 mg, Oral, BID, Bridger Guaman MD, 500 mg at 08/17/20 0955    insulin lispro (HUMALOG) injection vial 0-10 Units, 0-10 Units, Subcutaneous, 4x Daily WC, Bridger Guaman MD, 6 Units at 08/16/20 2027    glucose (GLUTOSE) 40 % oral gel 15 g, 15 g, Oral, PRN, Bridger Guaman MD    dextrose 50 % IV solution, 12.5 g, Intravenous, PRN, Bridger Guaman MD    glucagon (rDNA) injection 1 mg, 1 mg, Intramuscular, PRN, Bridger Guaman MD    dextrose 5 % solution, 100 mL/hr, Intravenous, PRN, Eri Campbell MD    Assessment:    Active Problems:    Brain mass  Resolved Problems:    * No resolved hospital problems. *      43-year-old gentleman presented with expressive aphasia and RUE weakness with left hilar pulmonary mass with CNS metastasis, most consistent with lung carcinoma stage IV. Hx of CVA with residual weakness.         RECOMMENDATIONS:  Need tissue to confirm diagnosis. For bronchoscopy tomorrow    This will be a difficult situation to treat systemically due to his significant comorbidities. Patient will need to work on nutation as well at PT/OT  Radiation oncology consulted, plan for WBRT. Full supportive care  Will continue to follow     Electronically signed by HUMBERTO Lee NP on 8/17/2020 at 10:30 AM     Attending Addendum:      Patient seen and examined personally. All pertinent labs and imaging reviewed. Case discussed with NP Ms. Marina Reinoso. Agree with progress note as above which has been updated to reflect my changes. Radiation oncology evaluation. Bronch tomorrow.      Rufino Garsia, [address] for [address]

## 2020-08-18 NOTE — PLAN OF CARE
Problem: Skin Integrity:  Goal: Will show no infection signs and symptoms  8/18/2020 0521 by Ulysses Char, RN  Outcome: Met This Shift  8/17/2020 2229 by Elizabeth Sykes RN  Outcome: Met This Shift  Goal: Absence of new skin breakdown  8/17/2020 2229 by Elizabeth Sykes RN  Outcome: Met This Shift

## 2020-08-18 NOTE — ANESTHESIA PRE PROCEDURE
Department of Anesthesiology  Preprocedure Note       Name:  Luis Honeycutt. Age:  72 y.o.  :  1950                                          MRN:  [id number]         Date:  2020      Surgeon: Hang Ramirez):  Dirk Arita DO    Procedure: Procedure(s):  BRONCHOSCOPY ADD ON COMPUTER ASSISTED(wants 12)    Medications prior to admission:   Prior to Admission medications    Medication Sig Start Date End Date Taking? Authorizing Provider   loratadine (CLARITIN) 10 MG tablet Take 10 mg by mouth 2 times daily as needed (itching)   Yes Historical Provider, MD   donepezil (ARICEPT) 5 MG tablet Take 5 mg by mouth nightly   Yes Historical Provider, MD   hydrALAZINE (APRESOLINE) 50 MG tablet Take 50 mg by mouth 4 times daily   Yes Historical Provider, MD   insulin glargine (LANTUS) 100 UNIT/ML injection vial Inject 13 Units into the skin nightly   Yes Historical Provider, MD   escitalopram (LEXAPRO) 5 MG tablet Take 5 mg by mouth daily   Yes Historical Provider, MD   guaiFENesin (ROBITUSSIN) 100 MG/5ML SOLN oral solution Take 200 mg by mouth every 6 hours as needed for Cough   Yes Historical Provider, MD   sennosides-docusate sodium (SENOKOT-S) 8.6-50 MG tablet Take 2 tablets by mouth 2 times daily as needed for Constipation   Yes Historical Provider, MD   ammonium lactate (LAC-HYDRIN) 12 % lotion Apply topically as needed for Dry Skin Apply topically as needed.     Historical Provider, MD   clopidogrel (PLAVIX) 75 MG tablet Take 1 tablet by mouth daily 18   Thomas Plasencia MD   amoxicillin-clavulanate (AUGMENTIN) 500-125 MG per tablet Take 1 tablet by mouth 2 times daily 18   Historical Provider, MD   doxycycline monohydrate (MONODOX) 100 MG capsule Take 100 mg by mouth 2 times daily    Historical Provider, MD   insulin lispro (HUMALOG) 100 UNIT/ML injection vial Inject 2-10 Units into the skin 4 times daily (before meals and nightly) 3/21/18   MITCHELL Schmidt   cloNIDine (CATAPRES) 0.1 MG tablet Take 1 tablet by mouth 3 times daily 3/21/18   MITCHELL Longo   atenolol (TENORMIN) 25 MG tablet Take 0.5 tablets by mouth daily 3/22/18   MITCHELL Longo   amLODIPine (NORVASC) 10 MG tablet Take 1 tablet by mouth nightly 3/21/18   MITCHELL Longo   docusate sodium (COLACE, DULCOLAX) 100 MG CAPS Take 100 mg by mouth daily 3/22/18   MITCHELL Longo   calcitRIOL (ROCALTROL) 0.25 MCG capsule Take 1 capsule by mouth every other day M / W / F 3/21/18   MITCHELL Longo   pantoprazole (PROTONIX) 40 MG tablet Take 1 tablet by mouth 2 times daily (before meals) 3/21/18   MITCHELL Longo   ferrous sulfate 324 (65 Fe) MG EC tablet Take 1 tablet by mouth daily (with breakfast) 3/21/18   MITCHELL Longo   fluticasone (FLONASE) 50 MCG/ACT nasal spray 1 spray by Nasal route 2 times daily    Historical Provider, MD   Multiple Vitamins-Minerals (THERAPEUTIC MULTIVITAMIN-MINERALS) tablet Take 1 tablet by mouth daily    Historical Provider, MD   magnesium oxide (MAG-OX) 400 (240 MG) MG tablet Take 1 tablet by mouth daily 4/11/16   Bandar Al DO   atorvastatin (LIPITOR) 80 MG tablet Take 1 tablet by mouth nightly 10/8/15   Arin Collins MD       Current medications:    Current Facility-Administered Medications   Medication Dose Route Frequency Provider Last Rate Last Dose    acetaminophen (TYLENOL) tablet 1,000 mg  1,000 mg Oral TID PRN Kenn Helms DO        mineral oil-hydrophilic petrolatum (HYDROPHOR) ointment   Topical BID PRN Kenn Helms DO        polyethylene glycol (GLYCOLAX) packet 17 g  17 g Oral Daily PRN Kenn Helms DO        amLODIPine (NORVASC) tablet 10 mg  10 mg Oral Nightly Pilar Bai MD   10 mg at 08/17/20 2159    atenolol (TENORMIN) tablet 12.5 mg  12.5 mg Oral Daily Pilar Bai MD   12.5 mg at 08/17/20 0956    atorvastatin (LIPITOR) tablet 80 mg  80 mg Oral Nightly Pilar Bai MD   80 mg at 08/17/20 2159    R53.1    Atherosclerosis of native artery of right lower extremity with rest pain ([facility]) I70.221    Pulmonary nodule R91.1    DKA (diabetic ketoacidoses) ([facility]) E11.10    Diabetic ulcer of right foot associated with type 2 diabetes mellitus ([facility] E11.621, L97.519    DKA (diabetic ketoacidoses) ([facility]) E11.10    Disorientation R41.0    Hyponatremia E87.1    Sepsis ([facility]) A41.9    HTN (hypertension) I10    Suprapubic catheter ([facility]) Z93.59    Acute upper GI bleed K92.2    Acute blood loss anemia D62    TERESITA (acute kidney injury) ([facility]) N17.9    Hyperkalemia, mild E87.5    DM2 (diabetes mellitus, type 2) ([facility]) E11.9    Hyperlipidemia E78.5    Chronic arterial ischemic stroke, with residual expressive aphasia etc I69.30    Possible dementia F03.90    Incontinence of urine (now s/p SPC) and stool R32    Former smoker Z87.891    Non-healing ulcer ([facility] [address] - of left hallux L98.499    Moderate protein-calorie malnutrition ([facility]) E44.0    PAD (peripheral artery disease) ([facility]) I73.9    Peripheral vascular disease ([facility]) I73.9    CKD (chronic kidney disease) stage 3, GFR 30-59 ml/min ([facility]) N18.3    Peripheral vascular disease of lower extremity with ulceration ([facility]) I73.9, L97.909    Skin ulcer of toe with fat layer exposed ([address] L97.502    Brain mass G93.89       Past Medical History:        Diagnosis Date    Anemia     Arthritis     Atherosclerosis of native arteries of extremity with rest pain ([address] 9/23/2015    Back pain, chronic     Cerebral artery occlusion with cerebral infarction ([facility])     CKD (chronic kidney disease) stage 3, GFR 30-59 ml/min ([facility]) 7/31/2018    Cognitive communication deficit     Depression     Diabetes mellitus ([address]     Diabetic ketoacidosis without coma associated with type 2 diabetes mellitus ([address] 4/6/2016    Diabetic ulcer of right foot associated with type 2 diabetes mellitus ([address] 4/8/2016    Difficulty walking     Gastrointestinal hemorrhage     Hypertension     Temporal Infrared Oral Infrared   SpO2: 96% 98% 97% 98%   Weight:       Height:                                                  BP Readings from Last 3 Encounters:   08/18/20 127/64   11/28/18 118/60   11/13/18 110/66       NPO Status:                                                   Date of last liquid consumption: 08/17/20                        Date of last solid food consumption: 08/17/20    BMI:   Wt Readings from Last 3 Encounters:   08/13/20 154 lb 4.8 oz (70 kg)   11/13/18 159 lb (72.1 kg)   10/26/18 159 lb (72.1 kg)     Body mass index is 20.93 kg/m². CBC:   Lab Results   Component Value Date    WBC 4.7 08/18/2020    RBC 2.88 08/18/2020    HGB 9.7 08/18/2020    HCT 30.5 08/18/2020    .9 08/18/2020    RDW 15.2 08/18/2020     08/18/2020       CMP:   Lab Results   Component Value Date     08/14/2020    K 5.4 08/14/2020    K 5.1 08/21/2018     08/14/2020    CO2 21 08/14/2020    BUN 33 08/14/2020    CREATININE 1.7 08/14/2020    GFRAA 49 08/14/2020    LABGLOM 49 08/14/2020    GLUCOSE 80 08/14/2020    GLUCOSE 242 05/17/2012    PROT 7.2 08/12/2020    CALCIUM 9.5 08/14/2020    BILITOT 0.3 08/12/2020    ALKPHOS 107 08/12/2020    AST 20 08/12/2020    ALT 14 08/12/2020       POC Tests: No results for input(s): POCGLU, POCNA, POCK, POCCL, POCBUN, POCHEMO, POCHCT in the last 72 hours.     Coags:   Lab Results   Component Value Date    PROTIME 13.0 08/13/2020    PROTIME 11.1 05/17/2012    INR 1.2 08/13/2020    APTT 45.0 03/11/2018       HCG (If Applicable): No results found for: PREGTESTUR, PREGSERUM, HCG, HCGQUANT     ABGs: No results found for: PHART, PO2ART, SDW8DPH, MJD9BZT, BEART, C3CBLOFS     Type & Screen (If Applicable):  No results found for: LABABO, LABRH    Drug/Infectious Status (If Applicable):  No results found for: HIV, HEPCAB    COVID-19 Screening (If Applicable): No results found for: COVID19      Anesthesia Evaluation  Patient summary reviewed no history of anesthetic complications:   Airway: Mallampati: II  TM distance: >3 FB   Neck ROM: full  Mouth opening: > = 3 FB Dental:          Pulmonary:   (+) decreased breath sounds,  current smoker                          ROS comment: Multiple brain metastases. Lung mass noted on CT  Metastatic lesions in the left cerebral hemisphere   Cardiovascular:    (+) hypertension:,         Rhythm: regular  Rate: normal                    Neuro/Psych:   (+) CVA (Right sided weakness, expressive aphasia, memory problems, b/l LE weakness, incontinence of stool and urine ):,              ROS comment: Difficulty walking  GI/Hepatic/Renal:   (+) renal disease: ESRD,           Endo/Other:    (+) DiabetesType II DM, using insulin, blood dyscrasia (HGB 9.7): anemia, arthritis: OA., .                 Abdominal:           Vascular:   + PVD, aortic or cerebral, . Anesthesia Plan      general     ASA 4       Induction: intravenous. MIPS: Postoperative opioids intended and Prophylactic antiemetics administered. Anesthetic plan and risks discussed with patient. Plan discussed with CRNA.                   Zenaida Guerra MD   8/18/2020

## 2020-08-18 NOTE — PROGRESS NOTES
Subjective: The patient is off floor for bronchoscopy.     Objective:    /64   Pulse 65   Temp 96.9 °F (36.1 °C) (Infrared)   Resp 18   Ht 6' (1.829 m)   Wt 154 lb 4.8 oz (70 kg)   SpO2 98%   BMI 20.93 kg/m²       CBC with Differential:    Lab Results   Component Value Date    WBC 4.7 08/18/2020    RBC 2.88 08/18/2020    HGB 9.7 08/18/2020    HCT 30.5 08/18/2020     08/18/2020    .9 08/18/2020    MCH 33.7 08/18/2020    MCHC 31.8 08/18/2020    RDW 15.2 08/18/2020    NRBC 0.0 03/18/2018    SEGSPCT 49 09/12/2013    LYMPHOPCT 52.1 08/18/2020    MONOPCT 6.1 08/18/2020    BASOPCT 0.4 08/18/2020    MONOSABS 0.29 08/18/2020    LYMPHSABS 2.46 08/18/2020    EOSABS 0.31 08/18/2020    BASOSABS 0.02 08/18/2020     CMP:    Lab Results   Component Value Date     08/14/2020    K 5.4 08/14/2020    K 5.1 08/21/2018     08/14/2020    CO2 21 08/14/2020    BUN 33 08/14/2020    CREATININE 1.7 08/14/2020    GFRAA 49 08/14/2020    LABGLOM 49 08/14/2020    GLUCOSE 80 08/14/2020    GLUCOSE 242 05/17/2012    PROT 7.2 08/12/2020    LABALBU 3.3 08/12/2020    LABALBU 4.2 05/17/2012    CALCIUM 9.5 08/14/2020    BILITOT 0.3 08/12/2020    ALKPHOS 107 08/12/2020    AST 20 08/12/2020    ALT 14 08/12/2020        Current Facility-Administered Medications:     acetaminophen (TYLENOL) tablet 1,000 mg, 1,000 mg, Oral, TID PRN, Marcie Huger, DO    mineral oil-hydrophilic petrolatum (HYDROPHOR) ointment, , Topical, BID PRN, Marcie Huger, DO    polyethylene glycol (GLYCOLAX) packet 17 g, 17 g, Oral, Daily PRN, Marcie Huger, DO    amLODIPine (NORVASC) tablet 10 mg, 10 mg, Oral, Nightly, Jaclyn Brewer MD, 10 mg at 08/17/20 2159    atenolol (TENORMIN) tablet 12.5 mg, 12.5 mg, Oral, Daily, Zoe Baez MD, 12.5 mg at 08/17/20 0956    atorvastatin (LIPITOR) tablet 80 mg, 80 mg, Oral, Nightly, Jaclyn Brewer MD, 80 mg at 08/17/20 2159    calcitRIOL (ROCALTROL) capsule 0.25 mcg, 0.25 mcg, Oral, Every Other Preet Rosenberg MD, 0.25 mcg at 08/17/20 0955    cloNIDine (CATAPRES) tablet 0.1 mg, 0.1 mg, Oral, TID, Rhett Moore MD, 0.1 mg at 08/17/20 2159    famotidine (PEPCID) tablet 20 mg, 20 mg, Oral, Nightly, hRett Moore MD, 20 mg at 08/17/20 2159    ferrous sulfate (IRON 325) tablet 324 mg, 324 mg, Oral, Daily with breakfast, Rhett Moore MD, 324 mg at 08/17/20 0955    fluticasone (FLONASE) 50 MCG/ACT nasal spray 1 spray, 1 spray, Nasal, BID, Rhett Moore MD, 1 spray at 08/17/20 2159    insulin glargine (LANTUS) injection vial 20 Units, 20 Units, Subcutaneous, Nightly, Rhett Moore MD, 20 Units at 08/17/20 2215    magnesium oxide (MAG-OX) tablet 400 mg, 400 mg, Oral, Daily, Rhett Moore MD, 400 mg at 08/17/20 0955    levETIRAcetam (KEPPRA) tablet 500 mg, 500 mg, Oral, BID, Rhett Moore MD, 500 mg at 08/17/20 2159    insulin lispro (HUMALOG) injection vial 0-10 Units, 0-10 Units, Subcutaneous, 4x Daily WC, Rhett Moore MD, 2 Units at 08/17/20 1707    glucose (GLUTOSE) 40 % oral gel 15 g, 15 g, Oral, PRN, Rhett Moore MD, 15 g at 08/18/20 0941    dextrose 50 % IV solution, 12.5 g, Intravenous, PRN, Rhett Moore MD    glucagon (rDNA) injection 1 mg, 1 mg, Intramuscular, PRN, Rhett Moore MD    dextrose 5 % solution, 100 mL/hr, Intravenous, PRN, Rhett Moore MD    Assessment:    Active Problems:    Brain mass  Resolved Problems:    * No resolved hospital problems. *      23-year-old gentleman presented with expressive aphasia and RUE weakness with left hilar pulmonary mass with CNS metastasis, most consistent with lung carcinoma stage IV.   Hx of CVA with residual weakness.         Plan:  Need tissue to confirm diagnosis.    For bronchoscopy today   This will be a difficult situation to treat systemically due to his significant comorbidities.    Patient will need to work on nutrition as well at PT/OT  Radiation oncology consulted for WBRT  Full supportive care  Will continue to follow    Electronically signed by HUMBERTO Nair NP on 8/18/2020 at 10:20 AM

## 2020-08-18 NOTE — PROGRESS NOTES
Patient has no IV access MICU and ER dept called to assist with IV restart after 3 RN's attempted restart unsuccessfully. Nick Comer with IV team also notified of need to restart IV. Pt's blood sugar 68 and 73 this am.  He is alert and oriented. NPO   Had Lantus last pm.  [employer] RN aware of need for IV dextrose when site obtained.

## 2020-08-18 NOTE — CONSULTS
48  Radiation Oncology Consult Note         8/18/2020    Pee Thompson.  72 y.o.   1950        Referring Physician: Dr. Bita Gu        PCP:  No primary care provider on file. DIAGNOSIS: Multiple brain masses with vasogenic edema, left upper lung mass, presumed brain metastasis of lung primary. History of Present Illness:   Mr. Pee Meier  is a 72y.o. year old male, with multiple comorbidities as listed below. and a long-term nursing home resident. He also has past history of heavy smoking until about a year ago. He was brought to the ER on 8/12/2020, with an approximate 5-day history of progressive aphasia and right UEs weakness. Was having urinary retention and had to have a supra pubic catheter. He is also had fecal soiling. Patient admits to weight loss, 40 to 50 pounds in the last few months. He otherwise does not give much history. He denies headache, nausea or vomiting. Has a mild nonproductive cough. No chest pain or shortness of breath    Imaging studies:    8/12/2020-CT head  WO contrast  Impression         1. Chronic left MCA stroke with large areas of encephalomalacic change    involving left insular cortex, left temporal lobe, left frontal lobe,    and anterior left parietal lobe.         2. New edema is associated with posterior left frontal lobe    surrounding a cortical mass measuring 1.6 x 1.6 cm. This finding might    suggest new intracranial metastases. MRI with and without contrast    could be helpful for further evaluation. MRI brain 8/13/2020 with contrast  Impression              1. Four metastatic lesions in the left cerebral hemisphere, the    largest in the high left posterior frontal lobe measuring 2 cm. The    dominant 2 cm lesion is associated with significant vasogenic edema. 2. 1.2 x 0.7 cm extra-axial mass along the right frontal convexity,    which may represent a meningioma or a dural metastasis.     3. Prominent area of encephalomalacia in the left cerebral hemisphere    consistent with chronic MCA infarction. CT chest 8/14/2020  Impression         3.8 x 2.57 m soft tissue mass arising from the left hilum and exerting    mass effect on the left upper lobe bronchus. Also noted is mediastinal    lymphadenopathy, multiple solid nodules within the right lower lobe in    a peribronchovascular distribution, and a 1.9 x 1.8 cm soft tissue    nodule within the right lower lobe adjacent to the azygos vein. These    findings are concerning for primary lung neoplasm with metastatic    spread of disease       CT abdomen pelvis from same date negative for neoplastic change    He underwent bronchoscopy and biopsy by Dr. Sri Jones today. The op note describes  no endobronchial lesions.   Left upper lung mass CT biopsy, cytology and brushings obtained    Past Medical History:      Diagnosis Date    Anemia     Arthritis     Atherosclerosis of native arteries of extremity with rest pain ([address] 9/23/2015    Back pain, chronic     Cerebral artery occlusion with cerebral infarction ([address]     CKD (chronic kidney disease) stage 3, GFR 30-59 ml/min ([facility]) 7/31/2018    Cognitive communication deficit     Depression     Diabetes mellitus ([address]     Diabetic ketoacidosis without coma associated with type 2 diabetes mellitus ([address] 4/6/2016    Diabetic ulcer of right foot associated with type 2 diabetes mellitus ([address] 4/8/2016    Difficulty walking     Gastrointestinal hemorrhage     Hypertension     Neuromuscular dysfunction of bladder     with suprapubic cathether in place    Onychomycosis 9/23/2015    Osteomyelitis of toe of left foot ([address] 2018    Osteomyelitis of toe of right foot ([address] 4199    Other symbolic dysfunctions (CODE)     Pulmonary nodule 4/5/2016    PVD (peripheral vascular disease) with claudication ([address] 9/23/2015    Right sided weakness 9/23/2015    Stroke ([address] 04/2016    with expressive aphasia, memory problems, b/l LE weakness, incontinence of stool and urine    Tobacco dependence 9/23/2015       Past Surgical History:      Procedure Laterality Date    BACK SURGERY      COLONOSCOPY      CYSTOSCOPY  4/8/16    Removal of Bladder Stone    ECHO COMPL W DOP COLOR FLOW  5/18/2012         ENDOSCOPY, COLON, DIAGNOSTIC      OTHER SURGICAL HISTORY  11/28/15    lap choley and cholangiogram    OTHER SURGICAL HISTORY  4/8/16    Suprapubic Catheter Insertion    ID COLONOSCOPY FLX DX W/COLLJ SPEC WHEN PFRMD N/A 3/12/2018    COLONOSCOPY performed by Rodri Ayoub MD at [facility] OFFICE/OUTPT VISIT,PROCEDURE ONLY Bilateral 3/19/2018    RESECTION OF BONE CURRETAGE LEFT 1ST DIGIT performed by Luciana Montano DPM at [facility] 46 ENDOSCOPY  03/09/2018       No Known Allergies    Medications:  Medications reviewed and reconciled.   Current Facility-Administered Medications   Medication Dose Route Frequency Provider Last Rate Last Dose    acetaminophen (TYLENOL) tablet 1,000 mg  1,000 mg Oral TID PRN [employer], DO        mineral oil-hydrophilic petrolatum (HYDROPHOR) ointment   Topical BID PRN [employer], DO        polyethylene glycol (GLYCOLAX) packet 17 g  17 g Oral Daily PRN [employer] Bleacher Loulou, DO        amLODIPine (NORVASC) tablet 10 mg  10 mg Oral Nightly [employer] Bleacher Loulou, DO   10 mg at 08/17/20 2159    atenolol (TENORMIN) tablet 12.5 mg  12.5 mg Oral Daily Opal Bleacher Loulou, DO   12.5 mg at 08/17/20 3263    atorvastatin (LIPITOR) tablet 80 mg  80 mg Oral Nightly [employer] Bleacher Loulou, DO   80 mg at 08/17/20 2159    calcitRIOL (ROCALTROL) capsule 0.25 mcg  0.25 mcg Oral Every Other Day [employer], DO   0.25 mcg at 08/17/20 9228    cloNIDine (CATAPRES) tablet 0.1 mg  0.1 mg Oral TID [employer] Bleacher Loulou, DO   0.1 mg at 08/17/20 2159    famotidine (PEPCID) tablet 20 mg  20 mg Oral Nightly [employer] Ign[employer] Bleacher Loulou, DO   20 mg at 08/17/20 2159    ferrous sulfate (IRON 325) tablet 324 mg  324 mg Oral Daily with breakfast Batsheva Reina, DO   324 mg at 08/17/20 0955    fluticasone (FLONASE) 50 MCG/ACT nasal spray 1 spray  1 spray Nasal BID Batsheva Reina, DO   1 spray at 08/17/20 2159    insulin glargine (LANTUS) injection vial 20 Units  20 Units Subcutaneous Nightly Diantha Alan Edmondiro, DO   20 Units at 08/17/20 2215    magnesium oxide (MAG-OX) tablet 400 mg  400 mg Oral Daily Diantha Galas Loulou, DO   400 mg at 08/17/20 2627    levETIRAcetam (KEPPRA) tablet 500 mg  500 mg Oral BID Diantha Galas Loulou, DO   500 mg at 08/17/20 2159    insulin lispro (HUMALOG) injection vial 0-10 Units  0-10 Units Subcutaneous 4x Daily WC Batsheva Reina, DO   2 Units at 08/17/20 1707    glucose (GLUTOSE) 40 % oral gel 15 g  15 g Oral PRN Diakevin Jamil, DO   15 g at 08/18/20 0941    dextrose 50 % IV solution  12.5 g Intravenous PRN Leominsterusman Reina, DO        glucagon (rDNA) injection 1 mg  1 mg Intramuscular PRN Batsheva Reina, DO        dextrose 5 % solution  100 mL/hr Intravenous PRN Batsheva Reina, DO   Stopped at 08/18/20 1500       Family History:  Family History   Problem Relation Age of Onset    Heart Disease Mother        Social History:       reports that he has been smoking cigarettes. He has a 5.00 pack-year smoking history. He has never used smokeless tobacco..   reports no history of alcohol use. .   reports no history of drug use. Review of Systems:  Obtained from the patient, chart review and nursing assessment. (As per history otherwise not contributory- not reliable)   Constitutional:  No fever, chills or night sweats. Denies recent weight loss.  Eyes:  No blurred or changes in vision. Denies discharge or pain.  ENT:  No headaches, hearing loss or vertigo. No mouth sores or sore throat. No change in taste or smell.  Cardiovascular:  No chest discomfort, dyspnea on exertion or palpitations.  Respiratory: Has + cough no sputum production or hemoptysis. Has no pleuritic pain.  Gastrointestinal: No abdominal pain, appetite loss or nausea. No change in bowel habits. No hematochezia or melena.  Genitourinary: Patient acknowledges no dysuria, trouble voiding, urgency or hematuria. No nocturia or increased frequency.  Musculoskeletal: No gait disturbance, weakness or joint complaints.  Integumentary: No rash or pruritis.  Neurological: No headache, diplopia, syncope, change in muscle strength, numbness or tingling. No change in gait, balance, coordination, mood, affect, memory, mentation, behavior.  Psychiatric: No anxiety, or depression.  Endocrine: No temperature intolerance. No excessive thirst, fluid intake, or urination. No tremor.  Hematologic/Lymphatic: No abnormal bruising or bleeding, blood clots or swollen lymph nodes.  Allergic/Immunologic: No nasal congestion or hives. Physical examination:   Vitals:    08/18/20 1330 08/18/20 1345 08/18/20 1449 08/18/20 1705   BP: (!) 148/70 134/67  (!) 152/70   Pulse: 65 68 66 79   Resp: 14 15 16 18   Temp:  97.2 °F (36.2 °C) 97.1 °F (36.2 °C) 96.9 °F (36.1 °C)   TempSrc:   Tympanic Temporal   SpO2: 96% 98%  100%   Weight:       Height:          ECOG Performance Status: 3    Constitutional: He is alert not confused    Lymphatic: No palpable neck lymphedema    Respiratory: Chest moving symmetrically    Abdomen: Abdomen is soft nontender. Suprapubic catheter in place    Musculoskeletal:Extremities: .   No pedal edema    CNS: Motor strength: Right UA 1+/5, left UV 4-/5                                     Right LE- 4- /5   left LE 4+/5    Radiation safety and oncologic treatment support:    - Previous radiation history:  No  - History of autoimmune or connective tissue disease:  No  - Nutritional support/ PEG:  Not applicable  - Dental evaluation:  Not applicable  -  requested:  Not asked for.  - Oncology Nurse navigator requested:  - Transportation for daily treatment:  Self    Other Investigations/Laboratory Data:    Lab Results   Component Value Date    WBC 4.7 08/18/2020    HGB 9.7 (L) 08/18/2020    HCT 30.5 (L) 08/18/2020    .9 (H) 08/18/2020     (H) 08/18/2020       Lab Results   Component Value Date     08/14/2020    K 5.4 08/14/2020    K 5.1 08/21/2018     08/14/2020    CO2 21 08/14/2020    BUN 33 08/14/2020    CREATININE 1.7 08/14/2020    GLUCOSE 80 08/14/2020    GLUCOSE 242 05/17/2012    CALCIUM 9.5 08/14/2020       Tumor markers:   Lab Results   Component Value Date    PSA 0.51 06/30/2016       Impression: CT and brain changes consistent with lung neoplasm with brain metastasis. Tissue diagnosis pending    Discussion/Plan: Recommend starting palliative whole brain radiation therapy emergently, as early as tomorrow. Rationale is to shrink the tumors, improve symptoms and try to reverse some of the neurologic changes. He is aware it is stage IV, and incurable. Goals addressed. Side effects are generally mild, mentioned. He really had no questions and wants to proceed with treatment          NCCN guidelines, version 2020 is used to help review treatment recommendations. Procedures and process involved with CT simulation and daily radiation treatments were explained. Toxicities, both expected and less common, were reviewed in detail. Questions were answered to their apparent satisfaction. Thank you,  for allowing us to participate in the care of your patient. Josh Juarez MD  [place] [id number] Oncology    [place]:  [phone]              FAX: [phone]    [place]:      [phone]             FAX:  [phone]      CC: No primary care provider on file. , No referring provider defined for this encounter.

## 2020-08-18 NOTE — PROGRESS NOTES
[facility]  Department of Internal Medicine  Division of Pulmonary, Critical Care and Sleep Medicine  Progress Note    Lydia Valentino DO, Nicky Harrison MD, [facility]    Patient: Dori Sams. MRN: [id number]  : 1950    Encounter Time: 9:56 AM     Date of Admission: 2020 10:25 PM    Primary Care Physician: No primary care provider on file. Reason for Consultation: Lung mass     HISTORY OF PRESENT ILLNESS : Dori Sams. 72 y.o. male was seen in consultation regarding the above chief compliant. SUBJECTIVE:    Same  Note reviewed  For Bronchoscopy today    OBJECTIVE:     PHYSICAL EXAM:   VITALS:     Intake/Output Summary (Last 24 hours) at 2020 0956  Last data filed at 2020 0643  Gross per 24 hour   Intake 240 ml   Output 1350 ml   Net -1110 ml        CONSTITUTIONAL:   A&O x 3, NAD  SKIN:     No skin discoloration  HEENT:     EOMI, MMM, No thrush  NECK:    No bruits, No JVP apprechiated  CV:      Sinus,  No murmur, No rubs, No gallops  PULMONARY:   Couse BS,  No Wheezing, No Rales, No Rhonchi      No noted egophony  ABDOMEN:     Soft, non-tender. BS normal. No R/R/G  EXT:    + deformities . No clubbing. No lower extremity edema, No venous stasis  PULSE:   Appears equal and palpable. PSYCHIATRIC:  Seems appropriate, No acute psycosis  MS:    No fractures, Gross weakness/new deficits  NEUROLOGIC:   Focal right deficits.      DATA: IMAGING & TESTING:     LABORATORY TESTS:    CBC:   Lab Results   Component Value Date    WBC 4.7 2020    RBC 2.88 2020    HGB 9.7 2020    HCT 30.5 2020    .9 2020    MCH 33.7 2020    MCHC 31.8 2020    RDW 15.2 2020     2020    MPV 8.7 2020     CMP:    Lab Results   Component Value Date     2020    K 5.4 2020    K 5.1 2018     2020    CO2 21 2020    BUN 33 2020    CREATININE 1.7 2020    GFRAA 49 2020    LABGLOM 49 2020    GLUCOSE 80 2020    GLUCOSE 242 2012    PROT 7.2 2020    LABALBU 3.3 2020    LABALBU 4.2 2012    CALCIUM 9.5 2020    BILITOT 0.3 2020    ALKPHOS 107 2020    AST 20 2020    ALT 14 2020     PT/INR:    Lab Results   Component Value Date    PROTIME 13.0 2020    PROTIME 11.1 2012    INR 1.2 2020        PRO-BNP:   Lab Results   Component Value Date    PROBNP 941 (H) 2016    PROBNP 66 2016      ABGs:   Lab Results   Component Value Date    PH 7.418 2016    PO2 94.8 2016    PCO2 35.1 2016     Hemoglobin A1C: No components found for: HGBA1C    IMAGING:  Imaging tests were completed and reviewed and discussed radiology and care team involved and reveals   Ct Abdomen Pelvis W Wo Contrast Additional Contrast? Oral    Result Date: 2020  Patient MRN:  [id number] : 1950 Age: 72 years Gender: Male Order Date:  2020 9:16 PM EXAM: CT ABDOMEN PELVIS W WO CONTRAST INDICATION:  rule out mets rule out mets  COMPARISON: CT the abdomen and pelvis, 2018 Technique: Low-dose CT  acquisition technique included one of following options; 1 . Automated exposure control, 2. Adjustment of MA and or KV according to patient's size or 3. Use of iterative reconstruction. Multiple computerized tomography sections of the abdomen and pelvis with sagittal and coronal MPR reconstructions were obtained from the top of the diaphragm to the pelvis. FINDINGS: LIVER: Unremarkable. GALLBLADDER: Surgically absent. SPLEEN:Unremarkable. ADRENALS:Unremarkable. KIDNEYS:Unremarkable. PANCREAS: Multiple foci of calcification in the pancreas consistent with chronic pancreatitis. The calcifications are most prominent in the uncinate process. No gross mass lesion or ductal dilatation. BOWEL: Severe fecal retention is seen throughout the colon indicating constipation.  No bowel wall thickening or obstruction is seen. APPENDIX:Unremarkable. BLADDER: Suprapubic catheter is seen decompressing the bladder. Apparent bladder wall thickening may be due to underdistention, neurogenic bladder cholecystitis. No surrounding edema is seen. REPRODUCTIVE ORGANS:Unremarkable. VASCULATURE: Severe calcified atherosclerosis is seen in the abdominal aorta and pelvic arteries. There is occlusion of the left internal iliac artery. Multiple high-grade stenoses seen in the right internal iliac artery. A high-grade stenosis is seen in the right external iliac artery and series 507, image 66. The stent is seen in the right common iliac artery. LYMPH NODES:Unremarkable. BONES: Severe loss of disc height at L4-5 and L5-S1. MISCELLANEOUS:No additional finding. 1. No evidence of metastatic disease. 2. Severe constipation. No evidence of mechanical obstruction. 3. Suprapubic catheter is seen decompressing the bladder. Apparent bladder wall thickening may be due to underdistention, neurogenic bladder or cystitis. 4. Severe atherosclerosis, with occlusion of the left internal iliac artery and high-grade stenoses in the right external and internal iliac arteries. Ct Head Wo Contrast    Result Date: 2020  Patient MRN:  [id number] : 1950 Age: 72 years Gender: Male Order Date:  2020 11:23 PM EXAM: CT HEAD WO CONTRAST COMPARISON: January 15, 2017 INDICATION:  Evaluate intracranial abnormality Evaluate intracranial abnormality TECHNIQUE: Axial unenhanced CT scanning was performed through the head without the use of intravenous contrast. Low-dose CT acquisition technique included one of the following options; 1. Automated exposure control, 2. Adjustment of mA and/or kV according to the patient's size or 3. Use of iterative reconstruction. FINDINGS: There are confluent areas of hypoattenuation involving the left insular cortex and subinsular white matter including left basal ganglia.  Areas of hypoattenuation are also present involving the left frontal lobe, left temporal lobe, and anterior left parietal lobe. New areas of hypoattenuation are seen involving posterior left frontal lobe involving the subcortical white matter with sparing of the cortex. There is a cortical mass involving left frontal lobe measuring approximately 1.6 x 1.6 cm. No acute intracranial hemorrhage. No abnormal extra-axial fluid collections. No hydrocephalus. 1. Chronic left MCA stroke with large areas of encephalomalacic change involving left insular cortex, left temporal lobe, left frontal lobe, and anterior left parietal lobe. 2. New edema is associated with posterior left frontal lobe surrounding a cortical mass measuring 1.6 x 1.6 cm. This finding might suggest new intracranial metastases. Ct Chest W Wo Contrast    Result Date: 2020  Patient Name:  Heladio Medina. Patient MRN:  [id number] Patient :  1950 Patient Age:  72 years Patient Gender:  Male Order Date:2020 9:16 PM EXAM:  CT CHEST W WO CONTRAST NUMBER OF IMAGES:  112 INDICATION:   rule out mets rule out mets COMPARISON: None. Technique: Multiple axial images were obtained from the apices of the lungs through the lung bases. Sagittal and coronal reconstructions performed for aid in interpretation of the study. FINDINGS: Lungs: There are multiple pulmonary nodules noted throughout the lungs. . Airway: Unremarkable. Heart/Vasculature: Moderate coronary artery after study calcification. There is a small aortic leaflet calcification. Extensive aortic arch calcification. Mediastinum: There is a 3.8 x 2.5 cm soft tissue mass which arises within the left hilum and exerts mass effect on the left upper lobe bronchus (series 10 image 34). Also noted is a 1.9 x 1.8 cm nodule within the right lower lobe adjacent to the azygos vein (series 10 image 47). There are several small nodular consolidations within the right lower lobe in a peribronchovascular distribution.  Centrilobular emphysema. Scattered areas of parenchymal fibrosis. There is a rounded 1.4 cm precarinal lymph node (series 2 image 30). Pleura: Unremarkable. Osseous structures/soft tissue: Unremarkable. Upper abdomen: Please see same day CT of the abdomen for description of findings or the diaphragm. Other: None. 3.8 x 2.57 m soft tissue mass arising from the left hilum and exerting mass effect on the left upper lobe bronchus. Also noted is mediastinal lymphadenopathy, multiple solid nodules within the right lower lobe in a peribronchovascular distribution, and a 1.9 x 1.8 cm soft tissue nodule within the right lower lobe adjacent to the azygos vein. These findings are concerning for primary lung neoplasm with metastatic spread of disease. Mri Brain W Wo Contrast    Result Date: 2020  Patient MRN:  [id number] : 1950 Age: 72 years Gender: Male Order Date:  2020 2:31 PM EXAM: MRI BRAIN W WO CONTRAST INDICATION:  brain mass Verbal to change from Dr. Jessica Garrison to with and without brain mass  COMPARISON: None FINDINGS: PARENCHYMA: Insufflation from chronic left MCA infarction seen, involving the left frontal, parietal and temporal lobes, as well as the insula and subinsular region. There is a 2 cm enhancing mass in the high left posterior frontal lobe resulting in significant vasogenic edema in the frontoparietal white matter. Another 4.5 mm metastatic lesion is seen in the inferior left frontal lobe (series 10, image 17). Another 2 mm lesion in the left frontal operculum (series 10, image 20). A 4 mm lesion is seen in the left cingulate gyrus, posterior to the splenium of corpus callosum (series 10, image 17). A 1.2 x 0.7 cm extra-axial enhancing lesion along the right frontal convexity, which may represent a metastasis or meningioma. VENTRICLES:No hydrocephalus. No extra-axial fluid. CALVARIUM:Calvarium is intact, without fracture or focal lesion.  SINUSES: Minimal mucosal thickening in the paranasal and mastoid sinuses. MISCELLANEOUS:No other abnormality identified. 1. Four metastatic lesions in the left cerebral hemisphere, the largest in the high left posterior frontal lobe measuring 2 cm. The dominant 2 cm lesion is associated with significant vasogenic edema. 2. 1.2 x 0.7 cm extra-axial mass along the right frontal convexity, which may represent a meningioma or a dural metastasis. 3. Prominent area of encephalomalacia in the left cerebral hemisphere consistent with chronic MCA infarction. Assessment:   3 72year old male with CVA from metastatic lung cancer  2. COPD  3. Multiple brain metastases. 4. Lung mass noted on CT  5. Metastatic lesions in the left cerebral hemisphere  6. History of COPD  7. Insulin-dependent diabetes mellitus  8. Hypertension  9. Hyperlipidemia           Plan:   1. Consult XRT for brain radiation? 2. Bronchodilators stopped  3. Anitseizure medications noted  4. Bronchoscopy with ENB today  5.  Oncology following    Roberta Villegas, MPH, Sherron Downing  Professor of Medicine  Pulmonary, Critical Care and Sleep Medicine

## 2020-08-18 NOTE — CARE COORDINATION
SOCIAL WORK/DISCHARGE PLANNING;  Pt down for Bronch,Spoke with [employer] Pt is bed hold and can return when medically ready.   Duke Tavares [facility]  [phone]

## 2020-08-18 NOTE — OP NOTE
was seen. The bronchoscope was withdrawn and the procedure ended. The patient was watched till extubated and was transferred to post anesthetic area in stable condition. COMPLICATIONS:   None    IMPRESSIONS:   [x]  Airway examination was normal  [x]  No evidence of endobronchial mass/lesion   [x] Unable to localize lesion with fluro and Electronavigational lesion  [x]  No signs of diffuse alveolar damage or hemorrhage. RECOMMENDATIONS:   [x]  The Patient was taken to the recovery area in satisfactory condition. [x]   Will need to attempt EBUS as the lesion in NOT assessabile by airway  [x]   Await final test/sample results. - but results are scwed for lack of getting to lesion  [x]   Call for follow-up appointment if appropriate.       Clinton Hobbs DO, MPH, FCCP, Danita Garzon

## 2020-08-18 NOTE — PROGRESS NOTES
radiation oncology  5. Keppra 500 mg twice daily  6. Continue amlodipine, atenolol  7. Continue atorvastatin and clonidine  8. Bronchoscopy today  9. Sliding scale insulin  10. Continue amlodipine, atenolol, atorvastatin, clonidine      Medications:  REVIEWED DAILY    Infusion Medications    dextrose       Scheduled Medications    amLODIPine  10 mg Oral Nightly    atenolol  12.5 mg Oral Daily    atorvastatin  80 mg Oral Nightly    calcitRIOL  0.25 mcg Oral Every Other Day    cloNIDine  0.1 mg Oral TID    famotidine  20 mg Oral Nightly    ferrous sulfate  324 mg Oral Daily with breakfast    fluticasone  1 spray Nasal BID    insulin glargine  20 Units Subcutaneous Nightly    magnesium oxide  400 mg Oral Daily    levETIRAcetam  500 mg Oral BID    insulin lispro  0-10 Units Subcutaneous 4x Daily WC     PRN Meds: acetaminophen, mineral oil-hydrophilic petrolatum, polyethylene glycol, glucose, dextrose, glucagon (rDNA), dextrose    Labs:     Recent Labs     08/16/20  0653 08/17/20  0615 08/18/20  0525   WBC 3.8* 3.2* 4.7   HGB 10.2* 9.9* 9.7*   HCT 32.0* 30.9* 30.5*    482* 497*       No results for input(s): NA, K, CL, CO2, BUN, CREATININE, CALCIUM, PHOS in the last 72 hours. Invalid input(s): MAGNES    No results for input(s): PROT, ALB, ALKPHOS, ALT, AST, BILITOT, AMYLASE, LIPASE in the last 72 hours. No results for input(s): INR in the last 72 hours. No results for input(s): [place] in the last 72 hours.     Chronic labs:    Lab Results   Component Value Date    CHOL 104 05/28/2016    TRIG 113 05/28/2016    HDL 49 05/28/2016    LDLCALC 32 05/28/2016    TSH 2.950 08/21/2018    PSA 0.51 06/30/2016    INR 1.2 08/13/2020    LABA1C 5.6 03/08/2018       Radiology: REVIEWED DAILY    +++++++++++++++++++++++++++++++++++++++++++++++++  [address],

## 2020-08-18 NOTE — PROGRESS NOTES
Notified Lisa Do in endo that the patient's blood sugar is up to 74 and IV was obtained. RN hung Dextrose 5% at 100ml/hr. Patient did not sign consent because he said he was not explained the risks and benefits.    Lachelle Arevalo

## 2020-08-18 NOTE — PLAN OF CARE
Problem: Skin Integrity:  Goal: Will show no infection signs and symptoms  Description: Will show no infection signs and symptoms  8/18/2020 1804 by Katharina Sanchez RN  Outcome: Met This Shift     Problem: Skin Integrity:  Goal: Absence of new skin breakdown  Description: Absence of new skin breakdown  Outcome: Met This Shift     Problem: Falls - Risk of:  Goal: Will remain free from falls  Description: Will remain free from falls  8/18/2020 1804 by Katharina Sanchez RN  Outcome: Met This Shift

## 2020-08-19 NOTE — CARE COORDINATION
SOCIAL WORK/DISCHARGE PLANNING;  Pt down for radiation. Spoke with liaison and notified her pt will need transport for outpt radiation treatments. [facility]ang Renteria able to provide transport as long as pt is going to outpt treatments at a hospital. Will need to let Trang Renteria know outpt scheduled upon discharge.   Chaya Muller [facility]  [phone]

## 2020-08-19 NOTE — PROGRESS NOTES
[address] [place] [address]   Department of Pharmacy   Pharmacist Transition of Care Services         Patient Demographics  Name:  Flor Agosto. Medical Record Number:  [id number]  Gender:  male   Age:  72 y.o. YOB: 1950    Primary Care Physician: No primary care provider on file. Primary Care Physician phone number:  None  Readmission Risk (% from EPIC Patient List): 18%       Pharmacist Review and Summary of Medications     Date of last reviewed/update: 8/19/20     Category Comments   New Medication Started   1. Levetiracetam 500 mg BID         Change in Outpatient Medication  (Dosage Form, Route,   Dose, or Frequency) 1. Basaglar switched to Lantus and increased to 20 units nightly         Discontinued Outpatient Medication   (or on Hold During Admission) 1. Escitalopram  2. Loratadine  3. Atorvastatin  4. Hydralazine  5. Ferrous sulfate  6. Calcitriol  7. Clopidogrel  8. Donepezil  9. Multivitamin  10. Augmentin  11. Doxycycline       Other              Pharmacist Patient Education:    Date  Person Educated Content of Education                 Documentation of Pharmacist Interventions and Follow-up Plan:     The following Pharmacist Transition of Care Services were completed:   [x]  Reviewed and summarized medication changes  []  Entire home medication list was reviewed for accuracy (sources: **)  []  Home medication list was updated or corrected     [x]  Reviewed discharge medication reconciliation  []  Discharge medication list was updated or corrected  []  Patient education was provided on new medications  []  Patient education was provided on medication changes  []  Reviewed the After Visit Summary (AVS) with patient    Additional Interventions:  []  Inpatient prescriber was contacted and the following pharmacy recommendations        were accepted: **     [] Other interventions: **        Pharmacist: Pamella Whaley PharmD, [facility]  Date:  8/19/2020 2:23 PM  Time spent counseling patient face-to-face OR over the phone on medications: 0 minutes

## 2020-08-19 NOTE — PROGRESS NOTES
Hospitalist Progress Note      SYNOPSIS: Patient admitted on 2020  Thepatient is a 72 y.o. male who presented to the emergency department with a history of expressive aphasia on the right upper extremity weakness that began days ago. He also complained of right arm weakness 3 to 4 days. Patient's CT shows chronic left MCA infarctions. MRI is pending. Patient will be admitted to hospitalist service with neurosurgery in consultation for evaluation and treatment. Subsequent to this patient's admission biopsy was performed by pulmonary medicine  Patient was seen by radiation oncology for multiple brain metastasis with vasogenic edema      SUBJECTIVE:    Patient seen and examined  Records reviewed. Stable overnight. No other overnight issues reported. He states he feels okay  His right hand and arm are weak  He is eating with his left hand    Temp (24hrs), Av.1 °F (36.2 °C), Min:96.2 °F (35.7 °C), Max:97.8 °F (36.6 °C)    DIET: DIET CARB CONTROL; Carb Control: 4 carbs/meal (approximate 1800 kcals/day); No Added Salt (3-4 GM)  CODE: Prior    Intake/Output Summary (Last 24 hours) at 2020 1118  Last data filed at 2020 0848  Gross per 24 hour   Intake 940 ml   Output 675 ml   Net 265 ml       OBJECTIVE:    /65   Pulse 68   Temp 96.2 °F (35.7 °C) (Temporal)   Resp 16   Ht 6' (1.829 m)   Wt 154 lb 4.8 oz (70 kg)   SpO2 99%   BMI 20.93 kg/m²     General appearance: No apparent distress, appears stated age and cooperative. HEENT:  Conjunctivae/corneas clear. Neck: Supple. No jugular venous distention. Respiratory: Clear to auscultation bilaterally, normal respiratory effort  Cardiovascular: Regular rate rhythm, normal S1-S2  Abdomen: Soft, nontender, nondistended  Musculoskeletal: No clubbing, cyanosis, no bilateral lower extremity edema. Brisk capillary refill.    Skin:  No rashes  on visible skin  Neurologic: awake, alert and following commands significant right-sided Component Value Date    CHOL 104 05/28/2016    TRIG 113 05/28/2016    HDL 49 05/28/2016    LDLCALC 32 05/28/2016    TSH 2.950 08/21/2018    PSA 0.51 06/30/2016    INR 1.2 08/13/2020    LABA1C 5.6 03/08/2018       Radiology: REVIEWED DAILY    +++++++++++++++++++++++++++++++++++++++++++++++++  [address], [place]  +++++++++++++++++++++++++++++++++++++++++++++++++  NOTE: This report was transcribed using voice recognition software. Every effort was made to ensure accuracy; however, inadvertent computerized transcription errors may be present.

## 2020-08-19 NOTE — PROGRESS NOTES
PAIGE met at bedside with patient and his significant other Ashleigh Cortez, to discuss goals of care and advance care planning. Pt plans  to continue palliative radiation, while at [facility] as he requires more assistance than can be provided at home. We discussed hospice care should patient and family decide on that option given his diagnosis of metastatic cancer. PAIGE also provided copies of  POA dn Living Will for patient to complete. He states he wants Gladies Space to be his main decision maker.

## 2020-08-19 NOTE — DISCHARGE SUMMARY
Discharge Summary    Magdalena Chris   :  1950  MRN:  [id number]    Admit date:  2020  Discharge date:  2020 2:08 PM    Admitting Physician:  Kurtis Perez DO    Discharge Diagnoses:    Patient Active Problem List    Diagnosis Date Noted    Acute upper GI bleed 2018     Priority: High    Acute blood loss anemia 2018     Priority: High    TERESITA (acute kidney injury) ([facility] 2018     Priority: High    Hyperkalemia, mild 2018     Priority: High    Disorientation 2016     Priority: High    Hyponatremia 2016     Priority: High    DM2 (diabetes mellitus, type 2) ([address] 2018     Priority: Medium    Hyperlipidemia 2018     Priority: Medium    PVD (peripheral vascular disease) with claudication ([facility] 2015     Priority: Medium    Type II diabetes mellitus, uncontrolled ([address] 10/29/2011     Priority: Medium    Chronic arterial ischemic stroke, with residual expressive aphasia etc 2018     Priority: Low    Possible dementia 2018     Priority: Low    Incontinence of urine (now s/p SPC) and stool 2018     Priority: Low    Former smoker 2018     Priority: Low    Non-healing ulcer ([address] - of left hallux 2018     Priority: Low    Brain metastasis ([facility]     Brain mass 2020    Skin ulcer of toe with fat layer exposed ([address] 2018    Peripheral vascular disease of lower extremity with ulceration ([address] 2018    CKD (chronic kidney disease) stage 3, GFR 30-59 ml/min ([facility]) 2018    PAD (peripheral artery disease) ([address] 2018    Peripheral vascular disease ([address]     Moderate protein-calorie malnutrition ([address] 2018    Sepsis ([address] 2017    HTN (hypertension) 2017    Suprapubic catheter ([address] 2017    DKA (diabetic ketoacidoses) ([address] 2016    Diabetic ulcer of right foot associated with type 2 diabetes mellitus ([facility] 2016    DKA (diabetic ketoacidoses) ([facility] 04/06/2016    Pulmonary nodule 04/05/2016    Atherosclerosis of native artery of right lower extremity with rest pain ([address]     Tobacco dependence 09/23/2015    Pain in lower limb 09/23/2015    Onychomycosis 09/23/2015    Atherosclerosis of native arteries of extremity with rest pain ([address] 09/23/2015    Right sided weakness 09/23/2015    Depression 01/10/2015    Neuropathy ([address] 09/12/2013    DJD (degenerative joint disease) 10/29/2011    Hyperlipoproteinemia 10/29/2011       Past Medical Hx :   Past Medical History:   Diagnosis Date    Anemia     Arthritis     Atherosclerosis of native arteries of extremity with rest pain ([address] 9/23/2015    Back pain, chronic     Cerebral artery occlusion with cerebral infarction ([address]     CKD (chronic kidney disease) stage 3, GFR 30-59 ml/min ([facility]) 7/31/2018    Cognitive communication deficit     Depression     Diabetes mellitus ([address]     Diabetic ketoacidosis without coma associated with type 2 diabetes mellitus ([address] 4/6/2016    Diabetic ulcer of right foot associated with type 2 diabetes mellitus ([address] 4/8/2016    Difficulty walking     Gastrointestinal hemorrhage     Hypertension     Neuromuscular dysfunction of bladder     with suprapubic cathether in place    Onychomycosis 9/23/2015    Osteomyelitis of toe of left foot ([address] 2018    Osteomyelitis of toe of right foot ([address] 6534    Other symbolic dysfunctions (CODE)     Pulmonary nodule 4/5/2016    PVD (peripheral vascular disease) with claudication ([address] 9/23/2015    Right sided weakness 9/23/2015    Stroke ([address] 04/2016    with expressive aphasia, memory problems, b/l LE weakness, incontinence of stool and urine    Tobacco dependence 9/23/2015       Past Surgical Hx :   Past Surgical History:   Procedure Laterality Date    BACK SURGERY      BRONCHOSCOPY N/A 8/18/2020    BRONCHOSCOPY ADD ON COMPUTER ASSISTED(wants 12) performed by René Craft DO at [address] [place] [address] 8/18/2020    BRONCHOSCOPY/TRANSBRONCHIAL NEEDLE BIOPSY performed by Johanny Schulz DO at [address] [facility]  8/18/2020    BRONCHOSCOPY BRUSHINGS performed by Johanny Schulz DO at [address] [facility]  8/18/2020    BRONCHOSCOPY ALVEOLAR LAVAGE performed by Johanny Schulz DO at [address]  4/8/16    Removal of Bladder Stone    ECHO COMPL W DOP COLOR FLOW  5/18/2012         ENDOSCOPY, COLON, DIAGNOSTIC      OTHER SURGICAL HISTORY  11/28/15    lap choley and cholangiogram    OTHER SURGICAL HISTORY  4/8/16    Suprapubic Catheter Insertion    [place] COLONOSCOPY FLX DX W/COLLJ SPEC WHEN PFRMD N/A 3/12/2018    COLONOSCOPY performed by Stepan Braga MD at [place] [address] OFFICE/OUTPT VISIT,PROCEDURE ONLY Bilateral 3/19/2018    RESECTION OF BONE CURRETAGE LEFT 1ST DIGIT performed by Esther Hamlin DPM at [place] [address] ENDOSCOPY  03/09/2018       Admission Condition:  poor    Discharged Condition:  poor    Labs:  CBC:   Lab Results   Component Value Date    WBC 3.8 08/19/2020    RBC 2.60 08/19/2020    HGB 8.8 08/19/2020    HCT 27.4 08/19/2020    .4 08/19/2020    MCH 33.8 08/19/2020    MCHC 32.1 08/19/2020    RDW 14.9 08/19/2020     08/19/2020    MPV 8.7 08/19/2020     CMP:    Lab Results   Component Value Date     08/14/2020    K 5.4 08/14/2020    K 5.1 08/21/2018     08/14/2020    CO2 21 08/14/2020    BUN 33 08/14/2020    CREATININE 1.7 08/14/2020    GFRAA 49 08/14/2020    LABGLOM 49 08/14/2020    GLUCOSE 80 08/14/2020    GLUCOSE 242 05/17/2012    PROT 7.2 08/12/2020    LABALBU 3.3 08/12/2020    LABALBU 4.2 05/17/2012    CALCIUM 9.5 08/14/2020    BILITOT 0.3 08/12/2020    ALKPHOS 107 08/12/2020    AST 20 08/12/2020    ALT 14 08/12/2020        Radiology Results: No results found. Hospital Course:  SYNOPSIS: Patient admitted on 8/12/2020  Thepatient is a 80 y. o. male who presented to the emergency department with a history of expressive aphasia on the right upper extremity weakness that began days ago. [facility] also complained of right arm weakness 3 to 4 days.  Patient's CT shows chronic left MCA infarctions.  MRI is pending.  Patient will be admitted to hospitalist service with neurosurgery in consultation for evaluation and treatment.     Subsequent to this patient's admission biopsy was performed by pulmonary medicine  Patient was seen by radiation oncology for multiple brain metastasis with vasogenic edema---patient will receive palliative care    Discharge Medications:    Awais Euceda. Home Medication Instructions [id number]    Printed on:08/19/20 9594   Medication Information                      amLODIPine (NORVASC) 10 MG tablet  Take 1 tablet by mouth nightly             atenolol (TENORMIN) 25 MG tablet  Take 0.5 tablets by mouth daily             cloNIDine (CATAPRES) 0.1 MG tablet  Take 1 tablet by mouth 3 times daily             fluticasone (FLONASE) 50 MCG/ACT nasal spray  1 spray by Nasal route 2 times daily             glucagon, rDNA, 1 MG injection  Inject 1 mg into the muscle as needed for Low blood sugar (Blood glucose less than 70 mg/dL and patient NOT ALERT or NPO and does not have IV access.)             insulin glargine (LANTUS) 100 UNIT/ML injection vial  Inject 20 Units into the skin nightly             insulin lispro (HUMALOG) 100 UNIT/ML injection vial  Inject 0-10 Units into the skin 4 times daily (with meals and nightly)             levETIRAcetam (KEPPRA) 500 MG tablet  Take 1 tablet by mouth 2 times daily             magnesium oxide (MAG-OX) 400 (240 MG) MG tablet  Take 1 tablet by mouth daily             mineral oil-hydrophilic petrolatum (HYDROPHOR) ointment  Apply topically as needed.              pantoprazole (PROTONIX) 40 MG tablet  Take 1 tablet by mouth 2 times daily (before meals)             polyethylene glycol (GLYCOLAX) 17 g packet  Take 17 g by mouth daily as needed for Constipation                 Discharge Exam:  General appearance: No apparent distress, appears stated age and cooperative. HEENT:  Conjunctivae/corneas clear. Neck: Supple. No jugular venous distention. Respiratory: Clear to auscultation bilaterally, normal respiratory effort  Cardiovascular: Regular rate rhythm, normal S1-S2  Abdomen: Soft, nontender, nondistended  Musculoskeletal: No clubbing, cyanosis, no bilateral lower extremity edema. Brisk capillary refill. Skin:  No rashes  on visible skin  Neurologic: awake, alert and following commands significant right-sided weakness         Disposition: SNF       ASSESSMENT:  1. Left hilar pulmonary mass c/w lung carcinoma stage IV  2. Metastatic lesions in the left cerebral hemisphere  3.   History of COPD  Patient Instructions:   REFER TO AVR or JOSE document    Signed:  Reeda Homans  [facility]  [employer]  8/19/2020, 2:08 PM

## 2020-08-19 NOTE — CONSULTS
Palliative Care Department  Palliative Medicine Initial Consult  Provider: Marissa Jesseshabbir CHOW      Shravan Nolan  [id number]    Hospital days prior to Consult: Hospital Day: 8    Referring Provider:  Josh Kelly MD  Attending Provider: Lance Villavicencio MD    Reason for Consult:  [x]  Code status Discussion  [x]  Clarify Patient's Long-Term Goals  [x]  Discuss Medical Treatment Options as it Relates to Patient/Family Goals of Care  [x]  Psychosocial support    Chief Complaint: right arm weakness for 3-4 days    Subjective:     HPI:  Shravan Paredes is a 72 y.o. male with significant past medical history of CKD stage 3, DM type 2 with ulcer of the right foot and DKA, neuromuscular dysfunction of the bladder with suprapubic catheter, osteomyelitis secondary to PVD/DM, GI bleed, depression, CVA who presented to the emergency department on 8/12 from nursing facility with right arm weakness. Significant other states patient was unable to use his right arm for 3-4 days prior to the nursing home sending him to the ER. SO was unable to visit the patient, but he was telling her he couldn't move his arm. Patient completed ED workup and was admitted to the hospital with the diagnoses of brain mass with right hemiparesis, COPD, DM type 2, HTN. Subjective events/Discussions:  Spoke with significant other and patient at bedside. Patient has had 40-50# weight loss, hasn't been able to walk in a long time. Lives in a nursing facility where family is unable to visit. SO doesn't believe he was being bathed. She is interested in hospice if biopsy/cytology comes back with proven cancer. Started palliative radiation to the brain. Goals of care: improve or maintain function/quality of life and continue current management    Code Status: full code    Advanced Directives: Patient expresses a desire to discuss further and Will ask the LSW to assist with completion    Surrogate/Legal NOK: Child  Contacts:   Son Natasha Roche     Spiritual assessment: No spiritual distress identified   Bereavement and grief: grief issues not identified and to be determined    History obtain from EMR, patient, family and staff    Past Medical History:   Diagnosis Date    Anemia     Arthritis     Atherosclerosis of native arteries of extremity with rest pain ([address] 9/23/2015    Back pain, chronic     Cerebral artery occlusion with cerebral infarction ([address]     CKD (chronic kidney disease) stage 3, GFR 30-59 ml/min ([address] 7/31/2018    Cognitive communication deficit     Depression     Diabetes mellitus ([address]     Diabetic ketoacidosis without coma associated with type 2 diabetes mellitus ([address] 4/6/2016    Diabetic ulcer of right foot associated with type 2 diabetes mellitus ([address] 4/8/2016    Difficulty walking     Gastrointestinal hemorrhage     Hypertension     Neuromuscular dysfunction of bladder     with suprapubic cathether in place    Onychomycosis 9/23/2015    Osteomyelitis of toe of left foot ([address] 2018    Osteomyelitis of toe of right foot ([address] 0079    Other symbolic dysfunctions (CODE)     Pulmonary nodule 4/5/2016    PVD (peripheral vascular disease) with claudication ([address] 9/23/2015    Right sided weakness 9/23/2015    Stroke ([address] 04/2016    with expressive aphasia, memory problems, b/l LE weakness, incontinence of stool and urine    Tobacco dependence 9/23/2015       Past Surgical History:   Procedure Laterality Date    BACK SURGERY      BRONCHOSCOPY N/A 8/18/2020    BRONCHOSCOPY ADD ON COMPUTER ASSISTED(wants 12) performed by Luis Carlos Rene DO at [address] [place] [address]  8/18/2020    BRONCHOSCOPY/TRANSBRONCHIAL NEEDLE BIOPSY performed by Luis Carlos Rene DO at [address] [place] [address]  8/18/2020    BRONCHOSCOPY BRUSHINGS performed by Luis Carlos Rene DO at [address] [place] [address]  8/18/2020    BRONCHOSCOPY ALVEOLAR LAVAGE performed by Luis Carlos Rene DO at [facility] ENDOSCOPY    COLONOSCOPY      CYSTOSCOPY  4/8/16    Removal of Bladder Stone    ECHO COMPL W DOP COLOR FLOW  5/18/2012         ENDOSCOPY, COLON, DIAGNOSTIC      OTHER SURGICAL HISTORY  11/28/15    lap choley and cholangiogram    OTHER SURGICAL HISTORY  4/8/16    Suprapubic Catheter Insertion    [place] COLONOSCOPY FLX DX W/COLLJ SPEC WHEN PFRMD N/A 3/12/2018    COLONOSCOPY performed by Jia Rojas MD at [facility] OFFICE/OUTPT VISIT,PROCEDURE ONLY Bilateral 3/19/2018    RESECTION OF BONE CURRETAGE LEFT 1ST DIGIT performed by Brigitte Forman DPM at [place] [address] ENDOSCOPY  03/09/2018       Current Medications:     amLODIPine  10 mg Oral Nightly    atenolol  12.5 mg Oral Daily    atorvastatin  80 mg Oral Nightly    calcitRIOL  0.25 mcg Oral Every Other Day    cloNIDine  0.1 mg Oral TID    famotidine  20 mg Oral Nightly    ferrous sulfate  324 mg Oral Daily with breakfast    fluticasone  1 spray Nasal BID    insulin glargine  20 Units Subcutaneous Nightly    magnesium oxide  400 mg Oral Daily    levETIRAcetam  500 mg Oral BID    insulin lispro  0-10 Units Subcutaneous 4x Daily WC       PRN Medications:  acetaminophen, mineral oil-hydrophilic petrolatum, polyethylene glycol, glucose, dextrose, glucagon (rDNA), dextrose    IV Medications:   dextrose Stopped (08/18/20 1500)       Inpatient medications reviewed: yes  Home Medications reviewed: yes    No Known Allergies    Family History   Problem Relation Age of Onset    Heart Disease Mother        Social history:   status: no  Marital status: Single  Living status: nursing home   Work history: retired  History of drug use: no  History of alcohol use: no    Review of Systems:  As per HPI, remaining review of systems negative/unremarkable  CONSTITUTIONAL:  fever, chill, rigors, nausea, vomiting, fatigue.   HEENT: blurry vision, double vision, hearing problem, tinnitus, hoarseness, dysphagia, performed on this patient (see findings above)  - symptom management plan established    Primary team managing- currently denies symptoms    #Pyschological  - Social and financial issues discussed  - Identified   Patient and SO feel overwhelmed at suddenness of cancer diagnosis    #Spiritual  - no issues identified    #Social  - social issues discussed  - SO would like to take patient home with hospice after palliative radiation or any other treatments are no longer available, but works and would need an aide to come in for 8 hours/day and doesn't think she can do private pay    #Care of the Dying Patient  - patient not imminently dying at this time, although sudden death would not be unexpected in someone with his health problems and age  - educated patient/family on disease trajectory   - emotional support provided to family    #Ethical and Legal  - patient has not completed advanced directives, patient and SO given HCPOA and living will paperwork to look at and opportunity to ask questions    #Prognosis  - educated patient/family about patient's overall prognosis and disease trajectory   - patient chart reviewed and based on physical assessment and patient/family interview, the following prognostication tools were completed    Baseline PPS 40  PPI 5.0     The following was achieved as a result of this Palliative Care encounter  - Patient/family verbalizes improved understanding of chronic illness and its trajectory  - patient/family goals of care have been identified  - Family Meeting:  Participants:Significant Other and patient  Family meeting was held to discuss:diagnosis, prognosis, treatment options, goals of care, prior expressed wishes, advanced care planning, symptom management and discharge plan  -Distressing symptoms identified and recommendations made  - Treatment options and plan of care have been discussed and revised  - advanced directives completed  - code status identified/changed  - Discharge planning facilitated: to be determined  - Discussed patient and the plan of care with the Palliative medicine IDT members. Controlled Substances Monitoring:      Time/Communication  54 minutes were spent in total for this visit, which consisted primarily of counseling and education dealing with the complex and emotionally intense issues of symptom management and palliative care in the setting of serious and potentially life-threatening illness. Patient/family had opportunity to ask questions and all questions were answered. Thank you for permitting Palliative Medicine to participate in the care of Isabella Harrison. Darryl Matos DO  Palliative Medicine

## 2020-08-19 NOTE — CARE COORDINATION
Care Coordination:  Spoke to Julian Greer from [facility], they will set up transport for after 430 as pt is leaving for radiation now. Rn to check schedule prior to dc an add to AVS for [facility] to arrange transport to radiation starting tomorrow for 15 days through sept 9 ( except sept 7). Significant other in there and is in agreenment. Palliative care in earlier and discussed hospice, she is not interested in this for now, plan to return to [facility]. Son is poa, as Yosef Gomes is only significant other. He will follow up with oncology in a few weeks.         Shobha Osei

## 2020-08-19 NOTE — CONSULTS
Ly Davis. Date of birth 1950    Diagnosis: Brain metastasis, status post lung mass biopsy    Patient will start palliative WBRT today. I have decided against Memantine, in view of his CNS issues and the side effects of the pill.     Susan Hogan MD

## 2020-08-19 NOTE — PROGRESS NOTES
Subjective: The patient is awake and alert in bed, significant other Pamalee Gentleman at the bedside. Patient just came back from radiation mapping to the brain today. Patient denies any pain. He is still very weak on his RUE and BLE. No fevers. Denies GI or  blood loss. Nutrition is not adequate. No problems overnight. Denies chest pain, angina, dyspnea or abdominal discomfort. No nausea or vomiting. Objective:    /65   Pulse 68   Temp 96.2 °F (35.7 °C) (Temporal)   Resp 16   Ht 6' (1.829 m)   Wt 154 lb 4.8 oz (70 kg)   SpO2 99%   BMI 20.93 kg/m²     General: NAD  HEENT: No thrush or mucositis, EOMI, PERRLA  Heart:  RRR, no murmurs, gallops, or rubs.   Lungs:  Diminished bilaterally, no wheeze, rales or rhonchi  Abd: bowel sounds present, nontender, nondistended, no masses  Extrem:  No clubbing, cyanosis, or edema  Lymphatics: No palpable adenopathy in cervical and supraclavicular regions  Skin: Intact, no petechia or purpura    CBC with Differential:    Lab Results   Component Value Date    WBC 3.8 08/19/2020    RBC 2.60 08/19/2020    HGB 8.8 08/19/2020    HCT 27.4 08/19/2020     08/19/2020    .4 08/19/2020    MCH 33.8 08/19/2020    MCHC 32.1 08/19/2020    RDW 14.9 08/19/2020    NRBC 0.0 03/18/2018    SEGSPCT 49 09/12/2013    LYMPHOPCT 27.5 08/19/2020    MONOPCT 8.8 08/19/2020    BASOPCT 0.3 08/19/2020    MONOSABS 0.33 08/19/2020    LYMPHSABS 1.03 08/19/2020    EOSABS 0.00 08/19/2020    BASOSABS 0.01 08/19/2020     CMP:    Lab Results   Component Value Date     08/14/2020    K 5.4 08/14/2020    K 5.1 08/21/2018     08/14/2020    CO2 21 08/14/2020    BUN 33 08/14/2020    CREATININE 1.7 08/14/2020    GFRAA 49 08/14/2020    LABGLOM 49 08/14/2020    GLUCOSE 80 08/14/2020    GLUCOSE 242 05/17/2012    PROT 7.2 08/12/2020    LABALBU 3.3 08/12/2020    LABALBU 4.2 05/17/2012    CALCIUM 9.5 08/14/2020    BILITOT 0.3 08/12/2020    ALKPHOS 107 08/12/2020    AST 20 08/12/2020    ALT 14 08/12/2020      Current Facility-Administered Medications:     acetaminophen (TYLENOL) tablet 1,000 mg, 1,000 mg, Oral, TID PRN, Lydia Valentino DO    mineral oil-hydrophilic petrolatum (HYDROPHOR) ointment, , Topical, BID PRN, Lydia Valentino DO    polyethylene glycol (GLYCOLAX) packet 17 g, 17 g, Oral, Daily PRN, Lydia Valentino DO    amLODIPine (NORVASC) tablet 10 mg, 10 mg, Oral, Nightly, Jefry Jamil DO, 10 mg at 08/18/20 2213    atenolol (TENORMIN) tablet 12.5 mg, 12.5 mg, Oral, Daily, Jefry Jamil DO, 12.5 mg at 08/19/20 0816    atorvastatin (LIPITOR) tablet 80 mg, 80 mg, Oral, Nightly, Lydia Valentino DO, 80 mg at 08/18/20 2213    calcitRIOL (ROCALTROL) capsule 0.25 mcg, 0.25 mcg, Oral, Every Other Day, Jefry Jamil DO, 0.25 mcg at 08/19/20 0465    cloNIDine (CATAPRES) tablet 0.1 mg, 0.1 mg, Oral, TID, Anahi Jamil DO, 0.1 mg at 08/19/20 0816    famotidine (PEPCID) tablet 20 mg, 20 mg, Oral, Nightly, Jefry Jamil DO, 20 mg at 08/18/20 2213    ferrous sulfate (IRON 325) tablet 324 mg, 324 mg, Oral, Daily with breakfast, Anahi Jamil DO, 324 mg at 08/19/20 0815    fluticasone (FLONASE) 50 MCG/ACT nasal spray 1 spray, 1 spray, Nasal, BID, Anahi Jamil DO, 1 spray at 08/19/20 0816    insulin glargine (LANTUS) injection vial 20 Units, 20 Units, Subcutaneous, Nightly, Lydia Valentino DO, 20 Units at 08/18/20 2226    magnesium oxide (MAG-OX) tablet 400 mg, 400 mg, Oral, Daily, Anahi Jamil DO, 400 mg at 08/19/20 0815    levETIRAcetam (KEPPRA) tablet 500 mg, 500 mg, Oral, BID, Jefry Jamil, DO, 500 mg at 08/19/20 0815    insulin lispro (HUMALOG) injection vial 0-10 Units, 0-10 Units, Subcutaneous, 4x Daily WC, Lydia Valentino, DO, 4 Units at 08/18/20 2217    glucose (GLUTOSE) 40 % oral gel 15 g, 15 g, Oral, PRN, Anahi Jamil, DO, 15 g at 08/18/20 0941    dextrose 50 % IV solution, 12.5 g, Intravenous, PRN, Maribel Jiménez DO    glucagon (rDNA) injection 1 mg, 1 mg, Intramuscular, PRN, Doswell Chrsitopher Jamil DO    dextrose 5 % solution, 100 mL/hr, Intravenous, PRN, Maribel Jiménez DO, Stopped at 08/18/20 1500    Assessment:    Active Problems:    Brain mass    Brain metastasis (HCC)  Resolved Problems:    * No resolved hospital problems. *      36-year-old gentleman presented with expressive aphasia and RUE weakness with left hilar pulmonary mass with CNS metastasis, most consistent with lung carcinoma stage IV. Hx of CVA with residual weakness. He is s/p Bronchoscopy with biopsy sent         Plan:  Need tissue to confirm diagnosis, s/p bronch yesterday, will await pathology   This will be a difficult situation to treat systemically due to his significant comorbidities.     Patient will need to work on nutrition as well at PT/OT. Will order Ensure. Radiation oncology consulted for WBRT, plant to start tx today, he did have mapping done this morning  Plan is for discharge back to [facility]. Patient will follow up with Dr. Edward Ramírez at the [facility] in 1-2 weeks to go over biopsy results and plan of care. Plan discussed in detail with patient, significant other, and social work.        Electronically signed by HUMBERTO Bond NP on 8/19/2020 at 12:14 PM     Patient seen and examined. Agree with above assessment plan. Case discussed with patient nurse practitioner. Patient to be discharged to home. Follow-up with Dr. Edward Ramírez. Proceed with the whole brain radiation therapy.     Malik Christensen MD

## 2020-08-20 NOTE — ANESTHESIA POSTPROCEDURE EVALUATION
Department of Anesthesiology  Postprocedure Note    Patient: Vitaly Nino MRN: [id number]  YOB: 1950  Date of evaluation: 8/20/2020  Time:  6:52 AM     Procedure Summary     Date:  08/18/20 Room / Location:  [facility] / [facility]    Anesthesia Start:  1698 Anesthesia Stop:  0167    Procedures:       BRONCHOSCOPY ADD ON COMPUTER ASSISTED(wants 12) (N/A )      BRONCHOSCOPY/TRANSBRONCHIAL NEEDLE BIOPSY      BRONCHOSCOPY BRUSHINGS      BRONCHOSCOPY ALVEOLAR LAVAGE Diagnosis:  (?)    Surgeon:  Rio Urena DO Responsible Provider:  Coretta Lara MD    Anesthesia Type:  general ASA Status:  4          Anesthesia Type: general    Samantha Phase I: Samantha Score: 10    Samantha Phase II:      Last vitals: Reviewed and per EMR flowsheets.        Anesthesia Post Evaluation    Patient location during evaluation: PACU  Patient participation: complete - patient participated  Level of consciousness: awake  Airway patency: patent  Nausea & Vomiting: no nausea and no vomiting  Complications: no  Cardiovascular status: hemodynamically stable  Respiratory status: acceptable  Hydration status: stable

## 2020-08-26 NOTE — PROGRESS NOTES
DEPARTMENT OF RADIATION ONCOLOGY   ON TREATMENT VISIT       8/26/2020      NAME:  Vick Evans YOB: 1950      Diagnosis:  1. Brain metastasis ([address]        SUBJECTIVE:   Vertis Don. status post 1500 cGy/6 fractions to the whole brain. He is stable. No headaches or dizziness. He is been discharged to [address] [place] [address]. EN bronchoscopy from 8/18/2020 was inconclusive. Scant material no malignant cells seen         Physical Examination: He is alert. No change in RUE weakness  Wt Readings from Last 3 Encounters:   08/13/20 154 lb 4.8 oz (70 kg)   11/13/18 159 lb (72.1 kg)   10/26/18 159 lb (72.1 kg)       Labs:  Lab Results   Component Value Date    WBC 3.8 08/19/2020    RBC 2.60 08/19/2020    HGB 8.8 08/19/2020    HCT 27.4 08/19/2020    .4 08/19/2020    MCH 33.8 08/19/2020    MCHC 32.1 08/19/2020    RDW 14.9 08/19/2020     08/19/2020    MPV 8.7 08/19/2020            ASSESSMENT/PLAN: PET/CT. Repeat ? EBUS bronchoscopy  Discussed all this with his wife. Shared the images and pathology findings with her. Questions answered. Continue treatment as planned      Charlee Felix M.D.   Radiation Oncologist  [place]: [phone]   Trish Ayala: [phone]: [phone]   Trish Ayala: [phone])

## 2020-08-26 NOTE — TELEPHONE ENCOUNTER
Attempted to call Kayode Navas x2 to obtain medication list & specifically see if pt is on/taking steroids per Dr. Lima Avers request. Rip Cavanaugh to reach anyone in nursing department. Dr. Jacquelyn Vazquez updated.

## 2020-09-02 NOTE — TELEPHONE ENCOUNTER
Per Kamilla Nelson RN at Dr. Yayo Loza office, patient has not yet been scheduled for an out-patient follow up appointment as of yet. I called [phone] and left a voice mail at his nurses station encouraging one be made asap. I left my contact information as well for any questions.

## 2020-09-02 NOTE — PROGRESS NOTES
DEPARTMENT OF RADIATION ONCOLOGY   ON TREATMENT VISIT       9/2/2020      NAME:  Gama Perkins. YOB: 1950      Diagnosis:  1. Brain metastasis ([address]        SUBJECTIVE:   Gama Perkins. status post 5086 cGy/11 fractions to whole brain. He says he has no symptoms. Physical Examination: Looks comfortable      Wt Readings from Last 3 Encounters:   08/13/20 154 lb 4.8 oz (70 kg)   11/13/18 159 lb (72.1 kg)   10/26/18 159 lb (72.1 kg)       Labs:  Lab Results   Component Value Date    WBC 3.8 08/19/2020    RBC 2.60 08/19/2020    HGB 8.8 08/19/2020    HCT 27.4 08/19/2020    .4 08/19/2020    MCH 33.8 08/19/2020    MCHC 32.1 08/19/2020    RDW 14.9 08/19/2020     08/19/2020    MPV 8.7 08/19/2020            ASSESSMENT/PLAN: Topical Aquaphor given. He had no questions    Continue treatment as planned      Adrien Carroll M.D.   Radiation Oncologist  [place]: [phone]   [place]: [phone] [place]: [phone]   [place]: [phone])

## 2020-09-04 NOTE — PROGRESS NOTES
Reviewed with Dr. Kerline Perry patients medical condition and abnormal EKG FROM 08/12/2020 . OK to procedure with surgery on 09/11/2020 without further cardiac testing.

## 2020-09-04 NOTE — PROGRESS NOTES
[address] PRE-ADMISSION TESTING GENERAL INSTRUCTIONS- [facility]-phone number:[phone]    GENERAL INSTRUCTIONS  [x] Antibacterial Soap shower Night before and/or AM of Surgery. [x] Nothing by mouth after midnight, including gum, candy, mints, or water. [x] You may brush your teeth, gargle, but do NOT swallow water. [x]No smoking, chewing tobacco, illegal drugs, or alcohol within 24 hours of your surgery. [x] Jewelry, valuables or body piercing's should not be brought to the hospital. All body and/or tongue piercing's must be removed prior to arriving to hospital.  ALL hair pins must be removed. [x] Do not wear makeup, lotions, powders, deodorant. Nail polish as directed by the nurse. [x] Arrange transportation with a responsible adult  to and from the hospital. If you do not have a responsible adult  to transport you, you will need to make arrangements with a medical transportation company (i.e. [employer]. A Uber/taxi/bus is not appropriate unless you are accompanied by a responsible adult ). Arrange for someone to be with you for the remainder of the day and for 24 hours after your procedure due to having had anesthesia. Who will be your  for transportation?__arrangements to be made by Kaiden________________   Who will be staying with you for 24 hrs after your procedure?__________________  [] Bring insurance card and photo ID.  [] Transfusion Bracelet: Please bring with you to hospital, day of surgery  [] Bring urine specimen day of surgery. Any small container is acceptable. [] Use inhalers the morning of surgery and bring with you to hospital.  [] Bring copy of living will or healthcare power of  papers to be placed in your electronic record. [] CPAP/BI-PAP: Please bring your machine if you are to spend the night in the hospital.     PARKING INSTRUCTIONS:   [x] Arrival Time:_09/011/2020      [address]  Entrance ___________  · [] Parking lot '\"I\"  is located on [facility] (the corner of [facility] and [facility]). To enter, press the button and the gate will lift. A free token will be provided to exit the lot. One car per patient is allowed to park in this lot. All other cars are to park on [address] [place] [address] either in the parking garage or the handicap lot. [x] To reach the [facility] lobby from [address] [place] [address], upon entering the hospital, take elevator B to the 3rd floor. EDUCATION INSTRUCTIONS:      [] Knee or hip replacement booklet & exercise pamphlets given. [] [address] placed in chart. [] Pre-admission Testing educational folder given  [] Incentive Spirometry,coughing & deep breathing exercises reviewed. []Medication information sheet(s)   []Fluoroscopy-Xray used in surgery reviewed with patient. Educational pamphlet placed in chart. []Pain: Post-op pain is normal and to be expected. You will be asked to rate your pain from 0-10(a zero is not acceptable-education is needed). Your post-op pain goal is:  [] Ask your nurse for your pain medication. [] Joint camp offered. [] Joint replacement booklets given. [] Other:___________________________    MEDICATION INSTRUCTIONS:   [x]Bring a complete list of your medications, please write the last time you took the medicine, give this list to the nurse. [x] Take the following medications the morning of surgery with 1-2 ounces of water:   REFER TO MED SHEET                                                                                                   [x] Stop herbal supplements and vitamins 5 days before your surgery. [x] DO NOT take any diabetic medicine the morning of surgery. Follow instructions for insulin the day before surgery. [x] If you are diabetic and your blood sugar is low or you feel symptomatic, you may drink 1-2 ounces of apple juice or take a glucose tablet.   The morning of your procedure, you

## 2020-09-04 NOTE — TELEPHONE ENCOUNTER
I called Dr. Codie Matos office and spoke with Cecile Zhao RN to confirm that patient is scheduled for an out-patient follow up appointment. Patient completes WBRT on 09/10/2020. He was scheduled for a follow up with Dr. Soraida Salas for 09/23/2020 at 0830. I asked if this appointment could be moved up at all to an earlier date. Amor Tam will address this with their schedulers and is aware that patient is currently residing at [facility].

## 2020-09-09 NOTE — PROGRESS NOTES
DEPARTMENT OF RADIATION ONCOLOGY   ON TREATMENT VISIT       2020      NAME:  Sanjay Samson. YOB: 1950      Diagnosis:  1. Brain metastasis ([address]        SUBJECTIVE:   Sanjay Samson. status post 3500 cGy/14 fractions to the whole brain. He denies any headaches or other symptoms. Says hair is falling. Physical Examination: Looks calm. Alopecia. No significant scalp inflammation, at best, grade 1 reaction      Wt Readings from Last 3 Encounters:   20 154 lb 4.8 oz (70 kg)   18 159 lb (72.1 kg)   10/26/18 159 lb (72.1 kg)       Labs:  Lab Results   Component Value Date    WBC 3.8 2020    RBC 2.60 2020    HGB 8.8 2020    HCT 27.4 2020    .4 2020    MCH 33.8 2020    MCHC 32.1 2020    RDW 14.9 2020     2020    MPV 8.7 2020            ASSESSMENT/PLAN: He is not on dexamethasone. Completes treatment tomorrow. Perhaps, systemic treatment after that. Follow-up would be scheduled    Continue treatment as planned      Irina Vasquez M.D.   Radiation Oncologist  [place]: [phone]   [place]an[place] Ree: [phone] [phone]   Genet Ree: [phone])

## 2020-09-09 NOTE — TELEPHONE ENCOUNTER
This RN called [employer] re: patient's current list of medications so I can update his medication list, spoke to nurse Efren Santos and states she will fax it to us right now.

## 2020-09-11 NOTE — PROGRESS NOTES
Patient signed out per anesthesia. VSS. Patient transferred to [facility]. Patient kept on appropriate monitors.

## 2020-09-11 NOTE — PROGRESS NOTES
Pt admitted for outpatient procedure from [place]. Wife to sign paperwork when she arrives. \"Wife\" arrived & admitted to registration staff that they are not legally . She states that she usually signs for him but that patient can sign for himself. Patient is clearly incompetent, oriented to person & place only & does not know why he was brought to the hospital or what he is having done today. ECF staff states he had small amount of Ensure @ 0800 & the patient said he ate food out of his drawer & off is tray from last night this morning. Dr Mahendra Hurst notified & will not do procedure until 1600. Dr Gwendolyn Lechuga notified of above. Endo Dept made aware.

## 2020-09-11 NOTE — PROGRESS NOTES
[facility] ambulance notified to  patient. Report called to jozef. Leonardo Lugo given report and updated on patient status and all pertinent information.

## 2020-09-11 NOTE — OP NOTE
[place]  Department of Pulmonary, Critical Care and Sleep Medicine  [address] [facility]  Department of Internal Medicine    BRONCHOSCOPY PROCEDURE NOTE    DATE OF PROCEDURE: 9/11/2020    HISTORY & INDICATIONS:  Robby Gutierrez is a 72 y.o. male with lung mass OH and brain mets with inconclusive ENB sample brought in for EBUS biopsy for histological diagnosis. The risks, benefits, complications, treatment options and expected outcomes were discussed with the patient and appropriate care givers. The possibilities of reaction to medication, pulmonary aspiration, perforation of an organ, bleeding, failure to diagnose a condition and creating a complication requiring transfusion or operation were discussed with the patient/POA who freely signed the informed consent.       PREOPERATIVE DIAGNOSIS:    [] Lung Nodule,  [x] Lung Cancer Evaluation,  [] Adenopathy,  [] Diffuse Lung Disease, (Cystic or Interstital)  [] Mucous Plug,  [x] Abnormal Imaging CXR/CT scan,       POSTOPERATIVE DIAGNOSES:  [x] Abnormal Endobronchial Evaluation,  [x] Normal Anatomy,  [] Adenopathy,  [] Diffuse Tracheobronchitis  [x] No Evidence of a endobronchial mass/tumor,      PROCEDURE PERFORMED:   [x] Diagnotic, Fiberoptic Bronchoscopy  [] Threapeutic, Fiberoptic Bronchoscopy     [] Electro-navigational Bronchoscopy  [] Electronavigational Placement of Fiduciary Markers   [x] Endobronchial Ultrasound  [x] EBUS Lymph Node Biospy  [] KRAMER lymph node Sampling  [] Bronchoalveolar Lavage [BAL]  [] Transbronchial Biopsy  [] Endobronchial Biopsy,   [x] Bronchial Wash   [] Bronchial [place] Sample       SURGEON:    Seretha Councilman, DO, FCCP, FACP, FACOI    ASSISTANT:    Bronchoscopy Nursing/Technical Team    SEDATION:   [x]  General Anesthesia  []  Regional Anesthesia Used  []  Versed, [] Fentanyl  [] Other: N/A   []  LMAC via Anesthesiology Team,     ANESTHESIA:    [x] Lidocaine 2% via intranasal instillation,   [] Epinephrine 1:10,000 diluted Endobronchial administration    ANESTHESIOLOGIST:  Per EPIC Note. Please refer to their documentation for full information    SPECIMENS OBTAINED & STUDIES SENT:  [x] Bronchial biospies for cytology. ESTIMATED BLOOD LOSS: Minimal    FINDINGS:     1. Studding of the OH bronchial tree. 2. No endobronchial lesions   3. Mild OH mucosal irregularities were appreciated. DESCRIPTION OF PROCEDURE:  After confirming informed consent a time out was held and the above information confirmed. Lucinda French was then sedated and anesthetized with the help of the Anesthesia Department. After an adequate level of sedation was achieved an fiberoptic bronchoscopy was inserted into the ET and advanced. Careful systematic inspection was then carried out of the entire tracheobronchial tree. Inspection of the tracheal lumen & all segments and sub segments was accomplished with no abnormal findings noted. Suction washing was used to clear the secretions from the lungs bilaterally. These were sent for culture. Using US via EBUS the OH lesion was localized and three biopsies were placed in cytology prep for analysis given the biopsy was done after 3 pm.     When this was completed, the additional bronchial washings were obtained. The area was then inspected for any acute hemorrhage, but none was seen. The bronchoscope was withdrawn and the procedure ended. The patient was watched till extubated and was transferred to post anesthetic area in stable condition. COMPLICATIONS:   None    IMPRESSIONS:   [x]  Airway examination was abnormal  [x]  No evidence of endobronchial mass/lesion   [x] WBUS localized lesion with biopsy x 3  [x]  No signs of diffuse alveolar damage or hemorrhage. RECOMMENDATIONS:   [x]  The Patient was taken to the recovery area in satisfactory condition. [x]   Await final test/sample results. - but results are scwed for lack of getting to lesion  [x]   Call for follow-up appointment if appropriate.   Family was updated his significant other -

## 2020-09-11 NOTE — H&P
[facility]  Department of Internal Medicine  Division of Pulmonary, Critical Care and Sleep Medicine  History and Physical Note    Lorrie Soulier, DO, Duke Fothergill, MD, [facility]    Patient: Lety Godwin. MRN: [id number]  : 1950    Encounter Time: 11:16 AM     Date of Admission: 2020 11:13 AM    Primary Care Physician: No primary care provider on file. Reason for Consultation: Lung mass     HISTORY OF PRESENT ILLNESS : Lety Godwin. 72 y.o. male was seen in consultation regarding the above chief compliant. Annemarie Garcia is a 72 y.o. male presenting to the ED for hx expressive aphasia with right upper extremity weakness, beginning days ago. The complaint has been persistent, moderate in severity, and worsened by nothing. Patient reporting his right arm was weak and it started about 3 or 4 days ago. Patient reporting he had expressive aphasia as well today. Patient reporting no history of stroke. He reports no chest pain or difficulty breathing there is no abdominal pain or vomiting there is no diarrhea. There is no headache. CT showed chronic left MCA infarction, possible brain mass. MRI showed four lesions in the left hemisphere consistent with metastases. His CT scan chest showed bilateral lung mass. As of [place] was discharged from the hospital and then subsequently went to rehabilitation from that he has been undergoing radiation brain therapy by Dr. Negin Hoffman.   We got a call from radiation department discussing his case during the radiation treatment will be stopped or finished and thus given the incomplete previous lung biopsy attempted by electro navigable bronchoscopy we will bring him back into the hospital for EBUS biopsy to attempt a histologic diagnosis of his cancer.       PAST MEDICAL HISTORY:  has a past medical history of Anemia, Arthritis, Atherosclerosis of native arteries of extremity with rest pain ([facility] [address] Back pain, chronic, Cerebral artery occlusion with cerebral infarction ([address] CKD (chronic kidney disease) stage 3, GFR 30-59 ml/min ([facility]), Cognitive communication deficit, Depression, Diabetes mellitus ([address] Diabetic ketoacidosis without coma associated with type 2 diabetes mellitus ([address] Diabetic ulcer of right foot associated with type 2 diabetes mellitus ([address] Difficulty walking, Gastrointestinal hemorrhage, Hypertension, Neuromuscular dysfunction of bladder, Onychomycosis, Osteomyelitis of toe of left foot ([address] Osteomyelitis of toe of right foot ([address] Other symbolic dysfunctions (CODE), Pulmonary nodule, PVD (peripheral vascular disease) with claudication ([address] Right sided weakness, Stroke ([address] and Tobacco dependence. SURGICAL HISTORY:  has a past surgical history that includes back surgery; ECHO Compl W Dop Color Flow (5/18/2012); other surgical history (11/28/15); Cystoscopy (4/8/16); other surgical history (4/8/16); Upper gastrointestinal endoscopy (03/09/2018); pr colonoscopy flx dx w/collj spec when pfrmd (N/A, 3/12/2018); pr office/outpt visit,procedure only (Bilateral, 3/19/2018); Colonoscopy; Endoscopy, colon, diagnostic; bronchoscopy (N/A, 8/18/2020); bronchoscopy (8/18/2020); bronchoscopy (8/18/2020); and bronchoscopy (8/18/2020). SOCIAL HISTORY:  reports that he has been smoking cigarettes. He has a 5.00 pack-year smoking history. He has never used smokeless tobacco. He reports that he does not drink alcohol or use drugs. FAMILY  HISTORY: family history includes Heart Disease in his mother. MEDICATIONS:    Prior to Admission medications    Medication Sig Start Date End Date Taking? Authorizing Provider   ammonium lactate (LAC-HYDRIN) 12 % lotion Apply topically as needed for Dry Skin Apply topically as needed.     Historical Provider, MD   levETIRAcetam (KEPPRA) 500 MG tablet Take 1 tablet by mouth 2 times daily  Patient taking differently: Take 500 mg by mouth 2 times daily Take morning of surgery with a sip of water 8/19/20   Sarbjit Fall MD   insulin glargine (LANTUS) 100 UNIT/ML injection vial Inject 20 Units into the skin nightly 8/19/20   Sarbjit Fall MD   insulin lispro (HUMALOG) 100 UNIT/ML injection vial Inject 0-10 Units into the skin 4 times daily (with meals and nightly) 8/19/20   Sarbjit Fall MD   mineral oil-hydrophilic petrolatum (HYDROPHOR) ointment Apply topically as needed. 8/19/20   Sarbjit Fall MD   glucagon, rDNA, 1 MG injection Inject 1 mg into the muscle as needed for Low blood sugar (Blood glucose less than 70 mg/dL and patient NOT ALERT or NPO and does not have IV access.) 8/19/20 8/14/21  Sarbjit Fall MD   polyethylene glycol (GLYCOLAX) 17 g packet Take 17 g by mouth daily as needed for Constipation 8/19/20 9/18/20  Sarbjit Fall MD   cloNIDine (CATAPRES) 0.1 MG tablet Take 1 tablet by mouth 3 times daily 3/21/18   MITCHELL Wright   atenolol (TENORMIN) 25 MG tablet Take 0.5 tablets by mouth daily  Patient taking differently: Take 12.5 mg by mouth daily Take morning of surgery with a sip of water 3/22/18   MITCHELL Wright   amLODIPine (NORVASC) 10 MG tablet Take 1 tablet by mouth nightly 3/21/18   MITCHELL Wright   pantoprazole (PROTONIX) 40 MG tablet Take 1 tablet by mouth 2 times daily (before meals)  Patient taking differently: Take 40 mg by mouth every morning Take morning of surgery with a sip of water 3/21/18   MITCHELL Wright   fluticasone (FLONASE) 50 MCG/ACT nasal spray 1 spray by Nasal route 2 times daily USE  morning of surgery    Historical Provider, MD   magnesium oxide (MAG-OX) 400 (240 MG) MG tablet Take 1 tablet by mouth daily 4/11/16 22 Fabricio Tompkins DO       ALLERGIES: Patient has no known allergies. REVIEW OF SYSTEMS:  Otherwise negative if not reported or listed below  Constitutional:  No unanticipated weight loss. No change in sleep pattern or activity.       No fevers, chills or rigors. Eyes:    No visual changes or diplopia. No scleral icterus. ENT:    No Headaches, hearing loss or vertigo. No mouth sores or sore throat. Cardiovascular:  + chest discomfort, palpitations. Respiratory:  + cough, No wheezing      No sputum production. No hemoptysis, pleuritic pain. Gastrointestinal:  No abdominal pain, appetite loss, blood in stools. No hematemesis  Genitourinary:  No dysuria, trouble voiding or hematuria. No nocturia. Musculoskeletal:   No weakness or joint complaints. Integumentary: No rashes or pruritis. Neurological:  No headache, numbness or tingling. Psychiatric:   No effect on mood, memory, mentation, or  behavior. No anxiety or depression. Endocrine:   No excessive thirst, fluid intake, or urination. No tremor. Hematologic: No abnormal bruising or bleeding. Lymphatic:  No swollen lymph nodes. Immunologic:  No hives or hx of anaphaxsis. OBJECTIVE:     PHYSICAL EXAM:   VITALS:   No intake or output data in the 24 hours ending 09/11/20 1116     CONSTITUTIONAL:   A&O x 3, NAD  SKIN:     No skin discoloration  HEENT:     EOMI, MMM, No thrush  NECK:    No bruits, No JVP apprechiated  CV:      Sinus,  No murmur, No rubs, No gallops  PULMONARY:   Couse BS,  No Wheezing, No Rales, No Rhonchi      No noted egophony  ABDOMEN:     Soft, non-tender. BS normal. No R/R/G  EXT:    + deformities . No clubbing. No lower extremity edema, No venous stasis  PULSE:   Appears equal and palpable. PSYCHIATRIC:  Seems appropriate, No acute psycosis  MS:    No fractures, Gross weakness/new deficits  NEUROLOGIC:   Focal right deficits.      DATA: IMAGING & TESTING:     LABORATORY TESTS:    CBC:   Lab Results   Component Value Date    WBC 3.8 08/19/2020    RBC 2.60 08/19/2020    HGB 8.8 08/19/2020    HCT 27.4 08/19/2020    .4 08/19/2020    MCH 33.8 08/19/2020    MCHC 32.1 08/19/2020    RDW 14.9 08/19/2020     2020    MPV 8.7 2020     CMP:    Lab Results   Component Value Date     2020    K 5.4 2020    K 5.1 2018     2020    CO2 21 2020    BUN 33 2020    CREATININE 1.7 2020    GFRAA 49 2020    LABGLOM 49 2020    GLUCOSE 80 2020    GLUCOSE 242 2012    PROT 7.2 2020    LABALBU 3.3 2020    LABALBU 4.2 2012    CALCIUM 9.5 2020    BILITOT 0.3 2020    ALKPHOS 107 2020    AST 20 2020    ALT 14 2020     PT/INR:    Lab Results   Component Value Date    PROTIME 13.0 2020    PROTIME 11.1 2012    INR 1.2 2020        PRO-BNP:   Lab Results   Component Value Date    PROBNP 941 (H) 2016    PROBNP 66 2016      ABGs:   Lab Results   Component Value Date    PH 7.418 2016    PO2 94.8 2016    PCO2 35.1 2016     Hemoglobin A1C: No components found for: HGBA1C    IMAGING:  Imaging tests were completed and reviewed and discussed radiology and care team involved and reveals   Ct Abdomen Pelvis W Wo Contrast Additional Contrast? Oral    Result Date: 2020  Patient MRN:  [id number] : 1950 Age: 72 years Gender: Male Order Date:  2020 9:16 PM EXAM: CT ABDOMEN PELVIS W WO CONTRAST INDICATION:  rule out mets rule out mets  COMPARISON: CT the abdomen and pelvis, 2018 Technique: Low-dose CT  acquisition technique included one of following options; 1 . Automated exposure control, 2. Adjustment of MA and or KV according to patient's size or 3. Use of iterative reconstruction. Multiple computerized tomography sections of the abdomen and pelvis with sagittal and coronal MPR reconstructions were obtained from the top of the diaphragm to the pelvis. FINDINGS: LIVER: Unremarkable. GALLBLADDER: Surgically absent. SPLEEN:Unremarkable. ADRENALS:Unremarkable. KIDNEYS:Unremarkable.  PANCREAS: Multiple foci of calcification in the pancreas consistent with chronic pancreatitis. The calcifications are most prominent in the uncinate process. No gross mass lesion or ductal dilatation. BOWEL: Severe fecal retention is seen throughout the colon indicating constipation. No bowel wall thickening or obstruction is seen. APPENDIX:Unremarkable. BLADDER: Suprapubic catheter is seen decompressing the bladder. Apparent bladder wall thickening may be due to underdistention, neurogenic bladder cholecystitis. No surrounding edema is seen. REPRODUCTIVE ORGANS:Unremarkable. VASCULATURE: Severe calcified atherosclerosis is seen in the abdominal aorta and pelvic arteries. There is occlusion of the left internal iliac artery. Multiple high-grade stenoses seen in the right internal iliac artery. A high-grade stenosis is seen in the right external iliac artery and series 507, image 66. The stent is seen in the right common iliac artery. LYMPH NODES:Unremarkable. BONES: Severe loss of disc height at L4-5 and L5-S1. MISCELLANEOUS:No additional finding. 1. No evidence of metastatic disease. 2. Severe constipation. No evidence of mechanical obstruction. 3. Suprapubic catheter is seen decompressing the bladder. Apparent bladder wall thickening may be due to underdistention, neurogenic bladder or cystitis. 4. Severe atherosclerosis, with occlusion of the left internal iliac artery and high-grade stenoses in the right external and internal iliac arteries. Ct Head Wo Contrast    Result Date: 2020  Patient MRN:  [id number] : 1950 Age: 72 years Gender: Male Order Date:  2020 11:23 PM EXAM: CT HEAD WO CONTRAST COMPARISON: January 15, 2017 INDICATION:  Evaluate intracranial abnormality Evaluate intracranial abnormality TECHNIQUE: Axial unenhanced CT scanning was performed through the head without the use of intravenous contrast. Low-dose CT acquisition technique included one of the following options; 1. Automated exposure control, 2.  Adjustment of mA and/or kV according to the patient's size or 3. Use of iterative reconstruction. FINDINGS: There are confluent areas of hypoattenuation involving the left insular cortex and subinsular white matter including left basal ganglia. Areas of hypoattenuation are also present involving the left frontal lobe, left temporal lobe, and anterior left parietal lobe. New areas of hypoattenuation are seen involving posterior left frontal lobe involving the subcortical white matter with sparing of the cortex. There is a cortical mass involving left frontal lobe measuring approximately 1.6 x 1.6 cm. No acute intracranial hemorrhage. No abnormal extra-axial fluid collections. No hydrocephalus. 1. Chronic left MCA stroke with large areas of encephalomalacic change involving left insular cortex, left temporal lobe, left frontal lobe, and anterior left parietal lobe. 2. New edema is associated with posterior left frontal lobe surrounding a cortical mass measuring 1.6 x 1.6 cm. This finding might suggest new intracranial metastases. Ct Chest W Wo Contrast    Result Date: 2020  Patient Name:  Loan Meyers. Patient MRN:  [id number] Patient :  1950 Patient Age:  72 years Patient Gender:  Male Order Date:2020 9:16 PM EXAM:  CT CHEST W WO CONTRAST NUMBER OF IMAGES:  [id number] INDICATION:   rule out mets rule out mets COMPARISON: None. Technique: Multiple axial images were obtained from the apices of the lungs through the lung bases. Sagittal and coronal reconstructions performed for aid in interpretation of the study. FINDINGS: Lungs: There are multiple pulmonary nodules noted throughout the lungs. . Airway: Unremarkable. Heart/Vasculature: Moderate coronary artery after study calcification. There is a small aortic leaflet calcification. Extensive aortic arch calcification. Mediastinum: There is a 3.8 x 2.5 cm soft tissue mass which arises within the left hilum and exerts mass effect on the left upper lobe bronchus (series 10 image 34).  Also noted is a 1.9 x 1.8 cm nodule within the right lower lobe adjacent to the azygos vein (series 10 image 47). There are several small nodular consolidations within the right lower lobe in a peribronchovascular distribution. Centrilobular emphysema. Scattered areas of parenchymal fibrosis. There is a rounded 1.4 cm precarinal lymph node (series 2 image 30). Pleura: Unremarkable. Osseous structures/soft tissue: Unremarkable. Upper abdomen: Please see same day CT of the abdomen for description of findings or the diaphragm. Other: None. 3.8 x 2.57 m soft tissue mass arising from the left hilum and exerting mass effect on the left upper lobe bronchus. Also noted is mediastinal lymphadenopathy, multiple solid nodules within the right lower lobe in a peribronchovascular distribution, and a 1.9 x 1.8 cm soft tissue nodule within the right lower lobe adjacent to the azygos vein. These findings are concerning for primary lung neoplasm with metastatic spread of disease. Mri Brain W Wo Contrast    Result Date: 2020  Patient MRN:  [id number] : 1950 Age: 72 years Gender: Male Order Date:  2020 2:31 PM EXAM: MRI BRAIN W WO CONTRAST INDICATION:  brain mass Verbal to change from Dr. Lacy Begum to with and without brain mass  COMPARISON: None FINDINGS: PARENCHYMA: Insufflation from chronic left MCA infarction seen, involving the left frontal, parietal and temporal lobes, as well as the insula and subinsular region. There is a 2 cm enhancing mass in the high left posterior frontal lobe resulting in significant vasogenic edema in the frontoparietal white matter. Another 4.5 mm metastatic lesion is seen in the inferior left frontal lobe (series 10, image 17). Another 2 mm lesion in the left frontal operculum (series 10, image 20). A 4 mm lesion is seen in the left cingulate gyrus, posterior to the splenium of corpus callosum (series 10, image 17).  A 1.2 x 0.7 cm extra-axial enhancing lesion along the right frontal convexity, which may represent a metastasis or meningioma. VENTRICLES:No hydrocephalus. No extra-axial fluid. CALVARIUM:Calvarium is intact, without fracture or focal lesion. SINUSES: Minimal mucosal thickening in the paranasal and mastoid sinuses. MISCELLANEOUS:No other abnormality identified. 1. Four metastatic lesions in the left cerebral hemisphere, the largest in the high left posterior frontal lobe measuring 2 cm. The dominant 2 cm lesion is associated with significant vasogenic edema. 2. 1.2 x 0.7 cm extra-axial mass along the right frontal convexity, which may represent a meningioma or a dural metastasis. 3. Prominent area of encephalomalacia in the left cerebral hemisphere consistent with chronic MCA infarction. Assessment:   3 72year old male with CVA from metastatic lung cancer. Previous inconclusive lung biopsy by electro navigational assessment. Status post complete treatment of whole brain radiation. 2. COPD  3. Multiple brain metastases. 4. Lung mass noted on CT  5. Metastatic lesions in the left cerebral hemisphere  6. History of COPD  7. Insulin-dependent diabetes mellitus  8. Hypertension  9. Hyperlipidemia           Plan: As mentioned due to incomplete inconclusive previous biopsy attempted by electro navigation will attempt bronchoscopy with ultrasound guidance under general anesthesia with risk benefits and alternatives outlined to the patient and family. It was noted that the previous biopsy was inconclusive and in fact this biopsy will be very difficult to obtain histologic dx  due to its location.   But should be attempted because he needs to determine histologic cell type for treatment options by chemotherapy etc.    Hemanth Guerrero, MPH, [facility]  Professor of Medicine  Pulmonary, Critical Care and Sleep Medicine

## 2020-09-11 NOTE — ANESTHESIA PRE PROCEDURE
Department of Anesthesiology  Preprocedure Note       Name:  Gamaliel Juarez. Age:  72 y.o.  :  1950                                          MRN:  [id number]         Date:  2020      Surgeon: Ricki Romero):  Manfred Ventura DO    Procedure: Procedure(s):  EBUS    Medications prior to admission:   Prior to Admission medications    Medication Sig Start Date End Date Taking? Authorizing Provider   levETIRAcetam (KEPPRA) 500 MG tablet Take 1 tablet by mouth 2 times daily  Patient taking differently: Take 500 mg by mouth 2 times daily Take morning of surgery with a sip of water 20  Yes Salma Garcia MD   insulin glargine (LANTUS) 100 UNIT/ML injection vial Inject 20 Units into the skin nightly 20  Yes Salma Garcia MD   cloNIDine (CATAPRES) 0.1 MG tablet Take 1 tablet by mouth 3 times daily 3/21/18  Yes MITCHELL Cardoso   atenolol (TENORMIN) 25 MG tablet Take 0.5 tablets by mouth daily  Patient taking differently: Take 12.5 mg by mouth daily Take morning of surgery with a sip of water 3/22/18  Yes MITCHELL Cardoso   amLODIPine (NORVASC) 10 MG tablet Take 1 tablet by mouth nightly 3/21/18  Yes LorMITCHELL Ware   pantoprazole (PROTONIX) 40 MG tablet Take 1 tablet by mouth 2 times daily (before meals)  Patient taking differently: Take 40 mg by mouth every morning Take morning of surgery with a sip of water 3/21/18  Yes MITCHELL Cardoso   fluticasone (FLONASE) 50 MCG/ACT nasal spray 1 spray by Nasal route 2 times daily USE  morning of surgery   Yes Historical Provider, MD   magnesium oxide (MAG-OX) 400 (240 MG) MG tablet Take 1 tablet by mouth daily 16  Yes Gregor Zheng DO   ammonium lactate (LAC-HYDRIN) 12 % lotion Apply topically as needed for Dry Skin Apply topically as needed.     Historical Provider, MD   insulin lispro (HUMALOG) 100 UNIT/ML injection vial Inject 0-10 Units into the skin 4 times daily (with meals and nightly) 20   Wilmer Gagnon Juan Degroot MD   mineral oil-hydrophilic petrolatum (HYDROPHOR) ointment Apply topically as needed.  8/19/20   Juan Diego Rosenthal MD   glucagon, rDNA, 1 MG injection Inject 1 mg into the muscle as needed for Low blood sugar (Blood glucose less than 70 mg/dL and patient NOT ALERT or NPO and does not have IV access.) 8/19/20 8/14/21  Juan Diego Rosenthal MD   polyethylene glycol (GLYCOLAX) 17 g packet Take 17 g by mouth daily as needed for Constipation 8/19/20 9/18/20  Juan Diego Rosenthal MD       Current medications:    Current Facility-Administered Medications   Medication Dose Route Frequency Provider Last Rate Last Dose    0.9 % sodium chloride infusion   Intravenous Continuous Clinton Journey, DO        sodium chloride flush 0.9 % injection 10 mL  10 mL Intravenous 2 times per day Clinton Journey, DO        sodium chloride flush 0.9 % injection 10 mL  10 mL Intravenous PRN Clinton Hobbs, DO           Allergies:  No Known Allergies    Problem List:    Patient Active Problem List   Diagnosis Code    DJD (degenerative joint disease) M19.90    Type II diabetes mellitus, uncontrolled ([facility] E11.65    Hyperlipoproteinemia E78.5    Neuropathy ([facility] G62.9    Depression F32.9    Tobacco dependence F17.200    Pain in lower limb M79.606    PVD (peripheral vascular disease) with claudication ([facility]) I73.9    Onychomycosis B35.1    Atherosclerosis of native arteries of extremity with rest pain ([facility] I70.229    Right sided weakness R53.1    Atherosclerosis of native artery of right lower extremity with rest pain ([facility]) I70.221    Pulmonary nodule R91.1    DKA (diabetic ketoacidoses) ([facility]) E11.10    Diabetic ulcer of right foot associated with type 2 diabetes mellitus ([facility] E11.621, L97.519    DKA (diabetic ketoacidoses) ([facility]) E11.10    Disorientation R41.0    Hyponatremia E87.1    Sepsis ([facility] A41.9    HTN (hypertension) I10    Suprapubic catheter ([facility]) Z93.59    Acute upper GI bleed K92.2    Acute blood weakness, incontinence of stool and urine    Tobacco dependence 9/23/2015       Past Surgical History:        Procedure Laterality Date    BACK SURGERY      BRONCHOSCOPY N/A 8/18/2020    BRONCHOSCOPY ADD ON COMPUTER ASSISTED(wants 12) performed by Jaylin Cardoso DO at [facility]  8/18/2020    BRONCHOSCOPY/TRANSBRONCHIAL NEEDLE BIOPSY performed by Jaylin Cardoso DO at [facility]  8/18/2020    BRONCHOSCOPY BRUSHINGS performed by Jaylin Cardoso DO at [facility]  8/18/2020    [address] performed by Jaylin Cardoso DO at [address]  4/8/16    Removal of Bladder Stone    ECHO COMPL W DOP COLOR FLOW  5/18/2012         ENDOSCOPY, COLON, DIAGNOSTIC      OTHER SURGICAL HISTORY  11/28/15    lap choley and cholangiogram    OTHER SURGICAL HISTORY  4/8/16    Suprapubic Catheter Insertion    DE COLONOSCOPY FLX DX W/COLLJ SPEC WHEN PFRMD N/A 3/12/2018    COLONOSCOPY performed by Nora Harrison MD at [place] [address] OFFICE/OUTPT VISIT,PROCEDURE ONLY Bilateral 3/19/2018    RESECTION OF BONE CURRETAGE LEFT 1ST DIGIT performed by James Gonzalez DPM at [address] [place] [address] ENDOSCOPY  03/09/2018       Social History:    Social History     Tobacco Use    Smoking status: Current Every Day Smoker     Packs/day: 0.10     Years: 50.00     Pack years: 5.00     Types: Cigarettes    Smokeless tobacco: Never Used   Substance Use Topics    Alcohol use: No     Alcohol/week: 23.3 standard drinks     Types: 28 Standard drinks or equivalent per week                                Ready to quit: Not Answered  Counseling given: Not Answered      Vital Signs (Current):   Vitals:    09/04/20 1429 09/11/20 1227   BP: (!) 108/59 112/66   Pulse: 68 64   Resp: 20 20   Temp: 98.9 °F (37.2 °C) 98 °F (36.7 °C)   TempSrc: Temporal Temporal   SpO2: 94% 94%   Weight: 158 lb 0.2 oz (71.7 kg) 158 lb (71.7 kg)   Height: 6' 4\" (1.93 m) 6' 4\" (1.93 m)                                              BP Readings from Last 3 Encounters:   09/11/20 112/66   08/19/20 129/61   08/18/20 (!) 201/79       NPO Status: Time of last liquid consumption: 0800                        Time of last solid consumption: 0800                        Date of last liquid consumption: 09/11/20                        Date of last solid food consumption: 09/11/20    BMI:   Wt Readings from Last 3 Encounters:   09/11/20 158 lb (71.7 kg)   08/13/20 154 lb 4.8 oz (70 kg)   11/13/18 159 lb (72.1 kg)     Body mass index is 19.23 kg/m². CBC:   Lab Results   Component Value Date    WBC 5.7 09/11/2020    RBC 3.24 09/11/2020    HGB 10.6 09/11/2020    HCT 32.8 09/11/2020    .2 09/11/2020    RDW 13.5 09/11/2020     09/11/2020       CMP:   Lab Results   Component Value Date     09/11/2020    K 4.8 09/11/2020    CL 98 09/11/2020    CO2 22 09/11/2020    BUN 46 09/11/2020    CREATININE 1.8 09/11/2020    GFRAA 45 09/11/2020    LABGLOM 45 09/11/2020    GLUCOSE 199 09/11/2020    GLUCOSE 242 05/17/2012    PROT 7.2 08/12/2020    CALCIUM 9.4 09/11/2020    BILITOT 0.3 08/12/2020    ALKPHOS 107 08/12/2020    AST 20 08/12/2020    ALT 14 08/12/2020       POC Tests: No results for input(s): POCGLU, POCNA, POCK, POCCL, POCBUN, POCHEMO, POCHCT in the last 72 hours.     Coags:   Lab Results   Component Value Date    PROTIME 13.0 08/13/2020    PROTIME 11.1 05/17/2012    INR 1.2 08/13/2020    APTT 45.0 03/11/2018       HCG (If Applicable): No results found for: PREGTESTUR, PREGSERUM, HCG, HCGQUANT     ABGs: No results found for: PHART, PO2ART, NMW3PYB, GQJ6IMM, BEART, X3VHCWFC     Type & Screen (If Applicable):  No results found for: LABABO, LABRH    Drug/Infectious Status (If Applicable):  No results found for: HIV, HEPCAB    COVID-19 Screening (If Applicable): No results found for: COVID19      Anesthesia Evaluation  Patient summary reviewed no history of anesthetic complications:   Airway: Mallampati: II  TM distance: >3 FB   Neck ROM: full  Mouth opening: > = 3 FB Dental:      Comment: None loose    Pulmonary:   (+) decreased breath sounds,  current smoker                          ROS comment: Multiple brain metastases. Lung mass noted on CT  Metastatic lesions in the left cerebral hemisphere   Cardiovascular:    (+) hypertension:,         Rhythm: regular  Rate: normal                    Neuro/Psych:   (+) CVA (Right sided weakness, expressive aphasia, memory problems, b/l LE weakness, incontinence of stool and urine ):,              ROS comment: Difficulty walking     Brain Ca GI/Hepatic/Renal:   (+) renal disease: ESRD,           Endo/Other:    (+) DiabetesType II DM, using insulin, blood dyscrasia (HGB 9.7): anemia, arthritis: OA., .                 Abdominal:           Vascular:   + PVD, aortic or cerebral, . Anesthesia Plan      general     ASA 4       Induction: intravenous. MIPS: Postoperative opioids intended, Prophylactic antiemetics administered and Postoperative trial extubation. Anesthetic plan and risks discussed with patient. Plan discussed with CRNA.                   Zoey Soto DO   9/11/2020

## 2020-11-03 PROBLEM — E78.5 HYPERLIPIDEMIA: Status: RESOLVED | Noted: 2018-03-07 | Resolved: 2020-01-01

## 2020-11-03 PROBLEM — I73.9 PERIPHERAL VASCULAR DISEASE (HCC): Status: RESOLVED | Noted: 2020-01-01 | Resolved: 2020-01-01

## 2020-11-03 PROBLEM — I73.9 PAD (PERIPHERAL ARTERY DISEASE) (HCC): Status: RESOLVED | Noted: 2018-07-30 | Resolved: 2020-01-01

## 2022-08-15 NOTE — PROGRESS NOTES
PROGRESS NOTE    Patient Presents with/Seen in Consultation For      *Upper GI bleed--big drop in Hgb, FOBT positive in ER  CHIEF COMPLAINT:  Low H/H on blood work at [facility]       Subjective:     Patient seen lying in bed in no acute distress. Just finished breakfast and reports he would like some food. Noted drop in H/H--will obtain bleeding scan. Denies abdominal pain, nausea or emesis. + BM (watery brown from colon prep) and flatus. Review of Systems  Aside from what was mentioned in the PMH and HPI, essentially unremarkable, all others negative. Objective:     Vitals: BP (!) 148/68   Pulse 55   Temp 97.5 °F (36.4 °C) (Oral)   Resp 18   Ht 6' 1\" (1.854 m)   Wt 155 lb (70.3 kg)   SpO2 97%   BMI 20.45 kg/m²     General appearance: alert, awake, laying in bed, and cooperative  Eyes: conjunctivae/corneas clear. PERRL. Lungs: clear to auscultation bilaterally  Heart: regular rate and rhythm, no murmur, 2+ pulses;   With 2+ edema to BLE  Abdomen: soft, non-tender; bowel sounds normal; no masses,  no organomegaly; watt patent and draining yellow colored urine  Extremities: extremities with 2+ edema; dressings dry and intact to BLE  Pulses: 2+ and symmetric  Skin: Skin color, texture, turgor normal.   Neurologic: Grossly normal      calcitRIOL (ROCALTROL) capsule 0.25 mcg Q MWF   atenolol (TENORMIN) tablet 25 mg Daily   vitamin D (ERGOCALCIFEROL) capsule 50,000 Units Weekly   0.9 % sodium chloride infusion Continuous   ammonium lactate (LAC-HYDRIN) 12 % lotion PRN   atorvastatin (LIPITOR) tablet 80 mg Nightly   cloNIDine (CATAPRES) tablet 0.1 mg Daily   guaiFENesin-dextromethorphan (ROBITUSSIN DM) 100-10 MG/5ML syrup 10 mL Q4H PRN   donepezil (ARICEPT) tablet 5 mg Nightly   fluticasone (FLONASE) 50 MCG/ACT nasal spray 1 spray BID   insulin glargine (LANTUS) injection vial 10 Units Nightly   insulin lispro (HUMALOG) injection vial 2-10 Units 4x Daily AC & HS   magnesium oxide (MAG-OX) tablet 400 mg Daily Component Value Date    FERRITIN 53 03/08/2018         Assessment:   Principal Problem:    Acute upper GI bleed  Active Problems:  ? Anemia, normocytic, normochromic felt secondary to blood loss - FOBT (+)--S/P transfusion with 2 U PRBC's  ? Hx ETOH abuse  ? Diabetes Mellitus, type 2  ? PVD and CVA currently on Plavix and Pletal  ? TERESITA  ? Grade A LA classification GERD, minimal gastritis, biopsy pending to rule out H. pylori infection, severe duodenitis (EGD 3/9/18). ? Colonoscopy per Dr Cee Hernandez on 3/12/18 with suboptimal prep demonstrating diffuse diverticulosis , melanosis coli otherwise unremarkable      Plan:     · Bleeding scan today  · H/H q8h  ? Continue Protonix as ordered  ? Continue to monitor CBC, CMP daily, transfuse per PCP  ? Defer co-morbidities to others  ? Continue to follow    We will follow closely with you.     Discussed with Dr. Elena Rios developed by Dr. Cee Hernandez    Electronically signed by Clementina Maynard NP on 3/13/2018 at 8:32 AM for Dr. Cee Hernandez Transfer for Combined Heart/Lung Transplant Eval

## 2023-01-23 NOTE — PROGRESS NOTES
Patient call back to the office with one reading 125/75.  Writer instructed him to take his blood pressure twice daily and we will call next week for the blood pressure log.   visit. Wound 03/07/18 Left left great toe distal-Wound Length (cm): 2 cm  Wound 03/08/18 right great toe medial-Wound Length (cm): 2 cm  Wound 03/08/18 right 2nd medial toe-Wound Length (cm): 3 cm  Wound 03/07/18 Left left great toe distal-Wound Width (cm): 2 cm  Wound 03/08/18 right great toe medial-Wound Width (cm): 1 cm  Wound 03/08/18 right 2nd medial toe-Wound Width (cm): 0.4 cm  Wound 03/07/18 Left left great toe distal-Wound Depth (cm) : obs  Wound 03/08/18 right great toe medial-Wound Depth (cm) : 0.2  Wound 03/08/18 right 2nd medial toe-Wound Depth (cm) : obs (dark red, dry)      Pre Debridement Measurements:  Wound 03/07/18 Left left great toe distal (Active)   Wound Type Wound 3/9/2018 10:21 AM   Dressing Status Clean;Dry; Intact 3/9/2018 10:21 AM   Dressing Changed Changed/New 3/9/2018 10:21 AM   Dressing/Treatment Pharmaceutical agent (see eMar);4x4;Dry dressing 3/9/2018 10:21 AM   Wound Cleansed Rinsed/Irrigated with saline 3/9/2018 10:21 AM   Dressing Change Due 03/10/18 3/9/2018 10:21 AM   Wound Length (cm) 2 cm 3/8/2018  3:28 PM   Wound Width (cm) 2 cm 3/8/2018  3:28 PM   Wound Depth (cm)  obs 3/8/2018  3:28 PM   Calculated Wound Size (cm^2) (l*w) 4 cm^2 3/8/2018  3:28 PM   Change in Wound Size % (l*w) -38.89 3/8/2018  3:28 PM   Wound Assessment White;Yellow 3/9/2018 10:21 AM   Drainage Amount Scant 3/9/2018 10:21 AM   Drainage Description Tan 3/9/2018 10:21 AM   Odor None 3/9/2018 10:21 AM   Louise-wound Assessment Calloused;Dry 3/9/2018 10:21 AM   Culture Taken Yes 3/8/2018 10:45 PM   Number of days: 1       Wound 03/08/18 right great toe medial (Active)   Wound Type Wound 3/8/2018 10:45 PM   Dressing Status Clean;Dry; Intact 3/9/2018 10:21 AM   Dressing Changed Changed/New 3/9/2018 10:21 AM   Dressing/Treatment Pharmaceutical agent (see eMar);4x4;Dry dressing 3/9/2018 10:21 AM   Wound Cleansed Rinsed/Irrigated with saline 3/9/2018 10:21 AM   Dressing Change Due 03/10/18 3/9/2018 10:21 AM   Wound Length (cm) 2 cm 3/8/2018  3:28 PM   Wound Width (cm) 1 cm 3/8/2018  3:28 PM   Wound Depth (cm)  0.2 3/8/2018  3:28 PM   Calculated Wound Size (cm^2) (l*w) 2 cm^2 3/8/2018  3:28 PM   Wound Assessment Brown;Drainage 3/9/2018 10:21 AM   Drainage Amount Scant 3/9/2018 10:21 AM   Drainage Description Tan 3/9/2018 10:21 AM   Odor None 3/9/2018 10:21 AM   Louise-wound Assessment Fragile 3/9/2018 10:21 AM   Culture Taken Yes 3/8/2018 10:45 PM   Number of days: 0       Wound 03/08/18 right 2nd medial toe (Active)   Dressing Status Clean;Dry; Intact 3/9/2018 10:21 AM   Dressing Changed Changed/New 3/9/2018 10:21 AM   Dressing/Treatment Pharmaceutical agent (see eMar); Other (Comment);4x4 3/9/2018 10:21 AM   Wound Cleansed Rinsed/Irrigated with saline 3/9/2018 10:21 AM   Dressing Change Due 03/10/18 3/9/2018 10:21 AM   Wound Length (cm) 3 cm 3/8/2018  3:28 PM   Wound Width (cm) 0.4 cm 3/8/2018  3:28 PM   Wound Depth (cm)  obs 3/8/2018  3:28 PM   Calculated Wound Size (cm^2) (l*w) 1.2 cm^2 3/8/2018  3:28 PM   Wound Assessment Fragile 3/9/2018 10:21 AM   Drainage Amount Scant 3/9/2018 10:21 AM   Drainage Description Tan 3/9/2018 10:21 AM   Number of days: 0      The patients pain isPain Level: 0 Pain Type: Acute pain. Wound is has slightly improved. Assessment:   Please refer to nursing measurements and assessment regarding wound measurements. Wound check - Care provided includes removal of existing dressing and visual inspection      Overall Wound Assessment  Wound is has slightly improved. Size has decreased  Appearance has improved  CONCLUSION:       1. Soft tissue ulceration/defect at the tip of the great toe with what   appears to be exposed bone. There is no associated fracture, bone   erosion or acute periosteal reaction. Recommend correlation with   physical examination. 2. Diffuse osteopenia.    3. Soft tissue swelling predominantly along the dorsum of the   forefoot.           Plan:   Plan for wound - Dress per physician order  Treatment:     Compression : No   Dressing : bactroban left 1st digit   Additional Therapy : santyl right 1st digit     1. Discussed appropriate care of this wound. Dispensed dressing supplies and instructions on their use. Wound redressed. 2. Patient instructions were given. 3. Follow up: PRN  4. Await MARY BETH's   5. Xray evaluated   6.  Possible surgical resection and biopsy left 1st digit    Electronically signed by Sanna Fortune DPM on 3/9/2018 at 11:29 AM

## (undated) DEVICE — CONTROL SYRINGE LUER-LOCK TIP: Brand: MONOJECT

## (undated) DEVICE — DRAPE,EXTREMITY,89X128,STERILE: Brand: MEDLINE

## (undated) DEVICE — PADDING,UNDERCAST,COTTON, 3X4YD STERILE: Brand: MEDLINE

## (undated) DEVICE — INTENDED FOR TISSUE SEPARATION, AND OTHER PROCEDURES THAT REQUIRE A SHARP SURGICAL BLADE TO PUNCTURE OR CUT.: Brand: BARD-PARKER ® STAINLESS STEEL BLADES

## (undated) DEVICE — SINGLE USE BIOPSY VALVE MAJ-210: Brand: SINGLE USE BIOPSY VALVE (STERILE)

## (undated) DEVICE — DRESSING PETRO W3XL8IN OIL EMUL N ADH GZ KNIT IMPREG CELOS

## (undated) DEVICE — CONTAINER VACUTAINER ANAER CULTURE SWAB

## (undated) DEVICE — SET PSI

## (undated) DEVICE — SOLUTION IV IRRIG 500ML 0.9% SODIUM CHL 2F7123

## (undated) DEVICE — SURGICAL PROCEDURE PACK BRONCH

## (undated) DEVICE — ELECTRODE PT RET AD L9FT HI MOIST COND ADH HYDRGEL CORDED

## (undated) DEVICE — SINGLE USE SUCTION VALVE MAJ-209: Brand: SINGLE USE SUCTION VALVE (STERILE)

## (undated) DEVICE — DOUBLE BASIN SET: Brand: MEDLINE INDUSTRIES, INC.

## (undated) DEVICE — EXTRAS PLASTIC

## (undated) DEVICE — 4-PORT MANIFOLD: Brand: NEPTUNE 2

## (undated) DEVICE — GLOVE ORANGE PI 7 1/2   MSG9075

## (undated) DEVICE — GAUZE,SPONGE,4"X4",8PLY,STRL,LF,10/TRAY: Brand: MEDLINE

## (undated) DEVICE — SOLUTION IV IRRIG POUR BRL 0.9% SODIUM CHL 2F7124

## (undated) DEVICE — DRESSING,GAUZE,XEROFORM,CURAD,5"X9",ST: Brand: CURAD

## (undated) DEVICE — SPONGE EYE L7CM NAT CELOS SPEAR VISITEC VISISPEAR

## (undated) DEVICE — BANDAGE,GAUZE,CONFORMING,3"X75",STRL,LF: Brand: MEDLINE

## (undated) DEVICE — GOWN,SIRUS,FABRNF,L,20/CS: Brand: MEDLINE

## (undated) DEVICE — ZIMMER® STERILE DISPOSABLE TOURNIQUET CUFF WITH PLC, DUAL PORT, SINGLE BLADDER, 18 IN. (46 CM)

## (undated) DEVICE — SET EXTN L14IN 1ML IV L BOR NO FLTR NO STPCOCK

## (undated) DEVICE — GAUZE,PACKING STRIP,PLAIN,1/2"X5YD,STRL: Brand: CURAD

## (undated) DEVICE — ADAPTER TBNG DIA15MM SWVL FBROPT BRONCHSCP TERM 2 AXIS PEEP

## (undated) DEVICE — PACK PROCEDURE SURG GEN CUST

## (undated) DEVICE — ENDOBRONCHIAL ULTRASOUND FINE NEEDLE BIOPSY (FNB) DEVICE: Brand: ACQUIRE™ PULMONARY

## (undated) DEVICE — TOWEL,OR,DSP,ST,BLUE,STD,6/PK,12PK/CS: Brand: MEDLINE

## (undated) DEVICE — BAG: SPONGE CT 10.25X32 2.0ML BLU 250/CS: Brand: MEDICAL ACTION INDUSTRIES

## (undated) DEVICE — SOLUTION IV 1000ML 0.9% SOD CHL PH 5 INJ USP VIAFLX PLAS

## (undated) DEVICE — GLOVE ORANGE PI 8   MSG9080

## (undated) DEVICE — READY WET SKIN SCRUB TRAY-LF: Brand: MEDLINE INDUSTRIES, INC.

## (undated) DEVICE — STERILE HOOK LOCK LATEX FREE ELASTIC BANDAGE 4INX5YD: Brand: HOOK LOCK™

## (undated) DEVICE — CHLORAPREP 26ML ORANGE

## (undated) DEVICE — BANDAGE,GAUZE,BULKEE II,4.5"X4.1YD,STRL: Brand: MEDLINE

## (undated) DEVICE — DRESSING,GAUZE,XEROFORM,CURAD,1"X8",ST: Brand: CURAD

## (undated) DEVICE — TUBE IRRIG HNDPC HI FLO TP INTRPULS W/SUCTION TUBE

## (undated) DEVICE — STRIP WND PK W0.25INXL5YD IODO GZ TIGHTLY WVN CURAD

## (undated) DEVICE — SPONGE LAP W18XL18IN WHT COT 4 PLY FLD STRUNG RADPQ DISP ST

## (undated) DEVICE — NEEDLE CYTO 21GA L137MM DIA1.9MM PULM BRAID TAPR SHTH OPT 5PK

## (undated) DEVICE — BNDG,ELSTC,MATRIX,STRL,3"X5YD,LF,HOOK&LP: Brand: MEDLINE

## (undated) DEVICE — PLATE ES AD W 9FT CRD 2

## (undated) DEVICE — SWAB SPEC COLL SHFT L5.25IN POLYUR FOAM TIP SFT DBL MEDIA

## (undated) DEVICE — PRECISION THIN (7.0 X 0.38 X 29.5MM)

## (undated) DEVICE — RETRACTOR WEITLANER SM

## (undated) DEVICE — PADDING,UNDERCAST,COTTON, 4"X4YD STERILE: Brand: MEDLINE

## (undated) DEVICE — BANDAGE,GAUZE,CONFORMING,4"X75",STRL,LF: Brand: MEDLINE

## (undated) DEVICE — PRECISION THIN (9.0 X 0.38 X 25.0MM)

## (undated) DEVICE — NEEDLE JAMSH BNE MAR 13G X 2

## (undated) DEVICE — STRIP,CLOSURE,WOUND,MEDI-STRIP,1/4X3: Brand: MEDLINE

## (undated) DEVICE — BANDAGE,ELASTIC,ESMARK,STERILE,4"X9',LF: Brand: MEDLINE

## (undated) DEVICE — TRAP SPEC MUCUS FOR SUCT

## (undated) DEVICE — SWABSTICK SURG PREP BENZOIN TINCTURE SINGLE ST

## (undated) DEVICE — CURETTE ORTHO

## (undated) DEVICE — PAD,ABDOMINAL,5"X9",ST,LF,25/BX: Brand: MEDLINE INDUSTRIES, INC.

## (undated) DEVICE — BAG SPECIMEN BIOHAZARD 6IN X 9IN

## (undated) DEVICE — BLADE SAW SAG OSCIL LNG MED 9X31MM

## (undated) DEVICE — DRESSING, CONTACT LAYER, VERSATEL, 3X4: Brand: MEDLINE

## (undated) DEVICE — BRUSH CYTO L120MM DIA1.7MM SHARPENED NDL TIP FWD FACING

## (undated) DEVICE — BANDAGE GZ W45INXL4 1 10YD FLUF RL 6 PLY DERMACEA

## (undated) DEVICE — GOWN,SIRUS,FABRNF,XL,20/CS: Brand: MEDLINE

## (undated) DEVICE — TUBING SUCT 12FR MAL ALUM SHFT FN CAP VENT UNIV CONN W/ OBT

## (undated) DEVICE — 6 X 9  1.75MIL 4-WALL LABGUARD: Brand: MINIGRIP COMMERCIAL LLC

## (undated) DEVICE — CATHETER DIAG 180DEG FIRM TIP EWC EDGE 180 SDK4000FT] SUPERDIMENSION INC]

## (undated) DEVICE — STANDARD HYPODERMIC NEEDLE,POLYPROPYLENE HUB: Brand: MONOJECT